# Patient Record
Sex: FEMALE | Race: WHITE | Employment: OTHER | URBAN - METROPOLITAN AREA
[De-identification: names, ages, dates, MRNs, and addresses within clinical notes are randomized per-mention and may not be internally consistent; named-entity substitution may affect disease eponyms.]

---

## 2017-01-10 ENCOUNTER — TELEPHONE (OUTPATIENT)
Dept: NEPHROLOGY | Facility: CLINIC | Age: 72
End: 2017-01-10

## 2017-01-10 RX ORDER — LOSARTAN POTASSIUM 25 MG/1
25 TABLET ORAL DAILY
Qty: 90 TABLET | Refills: 1 | Status: SHIPPED | OUTPATIENT
Start: 2017-01-10 | End: 2017-07-11

## 2017-01-10 NOTE — TELEPHONE ENCOUNTER
BP readings     1/4- 137/84,61    1/5- 148/81,65    1/6- 129/70,64    1/7- 826/85,75    1/8- 145/80,60    1/9- 123/73,71    1/10- 759/54,43

## 2017-01-10 NOTE — TELEPHONE ENCOUNTER
Patient returned call. Dr. Tia Rivera blood pressure advice relayed. Patient is aware to start Losartan 25mg daily as Dr. Sarah Vidal advised.  She requested prescription be sent to [facility] order pharmacy so she will start monitoring her blood pressure for 1

## 2017-01-26 ENCOUNTER — TELEPHONE (OUTPATIENT)
Dept: NEPHROLOGY | Facility: CLINIC | Age: 72
End: 2017-01-26

## 2017-01-26 RX ORDER — AZITHROMYCIN 250 MG/1
TABLET, FILM COATED ORAL
Qty: 1 PACKAGE | Refills: 0 | Status: SHIPPED | OUTPATIENT
Start: 2017-01-26 | End: 2017-05-18

## 2017-01-26 NOTE — TELEPHONE ENCOUNTER
Patient contacted. Informed that Dr. Alisha Avelar ordered a Z Pack and it was sent to [facility]. Advised to be seen if not better.

## 2017-01-26 NOTE — TELEPHONE ENCOUNTER
BP Readings - Left Arm    Date   BP  Pulse  1/18/2017  115/73  76  1/19/2017  122/77  69  1/20/2017  121/77  69  1/21/2017  135/87  69  1/22/2017  129/73  61  1/23/2017  128/72  68  1/24/2017  112/75  82  1/25/2017  116/73  69    For add'l questions pls

## 2017-01-26 NOTE — TELEPHONE ENCOUNTER
Patient contacted and Dr. Almonte Hence blood pressure advice relayed. TUAN attached a sick message below the blood pressure readings. Patient has severe nasal congestion since Monday. Pressure and pain around eyes, forehead and jaw.  She says it is a sinus infe

## 2017-01-26 NOTE — TELEPHONE ENCOUNTER
Pt states she has not been feeling well. Requesting rx for Azithromycin for sinus infection. Current Outpatient Prescriptions:  azithromycin 250 MG Oral Tab Take 2 tablets the first day and 1 tablet every day after until completed.  Disp: 1 Package

## 2017-03-27 RX ORDER — SIMVASTATIN 40 MG
TABLET ORAL
Qty: 90 TABLET | Refills: 0 | Status: SHIPPED | OUTPATIENT
Start: 2017-03-27 | End: 2019-09-06

## 2017-03-27 RX ORDER — FLUTICASONE PROPIONATE 50 MCG
SPRAY, SUSPENSION (ML) NASAL
Qty: 48 G | Refills: 0 | Status: SHIPPED | OUTPATIENT
Start: 2017-03-27 | End: 2018-01-17

## 2017-05-18 RX ORDER — PROMETHAZINE HYDROCHLORIDE AND CODEINE PHOSPHATE 6.25; 1 MG/5ML; MG/5ML
SYRUP ORAL
Qty: 180 ML | Refills: 0 | OUTPATIENT
Start: 2017-05-18 | End: 2018-07-02 | Stop reason: ALTCHOICE

## 2017-05-18 RX ORDER — AZITHROMYCIN 250 MG/1
TABLET, FILM COATED ORAL
Qty: 1 PACKAGE | Refills: 0 | Status: SHIPPED | OUTPATIENT
Start: 2017-05-18 | End: 2017-12-29 | Stop reason: ALTCHOICE

## 2017-05-18 NOTE — TELEPHONE ENCOUNTER
Rx for Zpak sent. I cannot sign off on the cough medicine since it is controlled. Patient notified. Let her know that we can call her with MKK signs the rx tomorrow. Rx pended.

## 2017-05-18 NOTE — TELEPHONE ENCOUNTER
Rx was authorized by [employer] so called it into [facility] and left it on the voicemail. Patient notified.

## 2017-05-18 NOTE — TELEPHONE ENCOUNTER
Received refill requests from the pharmacy for cough medicine and Zpak. Called pt to find out why she's requesting these. She states on Tuesday, 5/16, she stated having a cough, sinus pain, and headache. Hasn't been able to sleep.  Alternates from congested

## 2017-06-30 ENCOUNTER — OFFICE VISIT (OUTPATIENT)
Dept: NEPHROLOGY | Facility: CLINIC | Age: 72
End: 2017-06-30

## 2017-06-30 ENCOUNTER — LAB ENCOUNTER (OUTPATIENT)
Dept: LAB | Facility: HOSPITAL | Age: 72
End: 2017-06-30
Attending: INTERNAL MEDICINE
Payer: MEDICARE

## 2017-06-30 VITALS
BODY MASS INDEX: 28.52 KG/M2 | WEIGHT: 171.19 LBS | HEIGHT: 65 IN | HEART RATE: 72 BPM | SYSTOLIC BLOOD PRESSURE: 171 MMHG | DIASTOLIC BLOOD PRESSURE: 80 MMHG

## 2017-06-30 DIAGNOSIS — Z12.31 ENCOUNTER FOR SCREENING MAMMOGRAM FOR BREAST CANCER: ICD-10-CM

## 2017-06-30 DIAGNOSIS — I10 ESSENTIAL HYPERTENSION: Primary | ICD-10-CM

## 2017-06-30 DIAGNOSIS — E78.00 PURE HYPERCHOLESTEROLEMIA: ICD-10-CM

## 2017-06-30 DIAGNOSIS — I10 ESSENTIAL HYPERTENSION: ICD-10-CM

## 2017-06-30 DIAGNOSIS — G43.109 MIGRAINE WITH AURA AND WITHOUT STATUS MIGRAINOSUS, NOT INTRACTABLE: ICD-10-CM

## 2017-06-30 LAB
ALBUMIN SERPL BCP-MCNC: 4.4 G/DL (ref 3.5–4.8)
ALBUMIN/GLOB SERPL: 1.6 {RATIO} (ref 1–2)
ALP SERPL-CCNC: 92 U/L (ref 32–100)
ALT SERPL-CCNC: 19 U/L (ref 14–54)
ANION GAP SERPL CALC-SCNC: 7 MMOL/L (ref 0–18)
AST SERPL-CCNC: 19 U/L (ref 15–41)
BASOPHILS # BLD: 0 K/UL (ref 0–0.2)
BASOPHILS NFR BLD: 0 %
BILIRUB SERPL-MCNC: 0.9 MG/DL (ref 0.3–1.2)
BILIRUB UR QL: NEGATIVE
BUN SERPL-MCNC: 14 MG/DL (ref 8–20)
BUN/CREAT SERPL: 15.7 (ref 10–20)
CALCIUM SERPL-MCNC: 9.9 MG/DL (ref 8.5–10.5)
CHLORIDE SERPL-SCNC: 106 MMOL/L (ref 95–110)
CHOLEST SERPL-MCNC: 167 MG/DL (ref 110–200)
CLARITY UR: CLEAR
CO2 SERPL-SCNC: 26 MMOL/L (ref 22–32)
COLOR UR: YELLOW
CREAT SERPL-MCNC: 0.89 MG/DL (ref 0.5–1.5)
EOSINOPHIL # BLD: 0 K/UL (ref 0–0.7)
EOSINOPHIL NFR BLD: 0 %
ERYTHROCYTE [DISTWIDTH] IN BLOOD BY AUTOMATED COUNT: 12.6 % (ref 11–15)
GLOBULIN PLAS-MCNC: 2.8 G/DL (ref 2.5–3.7)
GLUCOSE SERPL-MCNC: 103 MG/DL (ref 70–99)
GLUCOSE UR-MCNC: NEGATIVE MG/DL
HCT VFR BLD AUTO: 43.4 % (ref 35–48)
HDLC SERPL-MCNC: 53 MG/DL
HGB BLD-MCNC: 14.9 G/DL (ref 12–16)
KETONES UR-MCNC: NEGATIVE MG/DL
LDLC SERPL CALC-MCNC: 74 MG/DL (ref 0–99)
LEUKOCYTE ESTERASE UR QL STRIP.AUTO: NEGATIVE
LYMPHOCYTES # BLD: 0.9 K/UL (ref 1–4)
LYMPHOCYTES NFR BLD: 20 %
MCH RBC QN AUTO: 31.7 PG (ref 27–32)
MCHC RBC AUTO-ENTMCNC: 34.2 G/DL (ref 32–37)
MCV RBC AUTO: 92.7 FL (ref 80–100)
MONOCYTES # BLD: 0.3 K/UL (ref 0–1)
MONOCYTES NFR BLD: 8 %
NEUTROPHILS # BLD AUTO: 3.2 K/UL (ref 1.8–7.7)
NEUTROPHILS NFR BLD: 72 %
NITRITE UR QL STRIP.AUTO: NEGATIVE
NONHDLC SERPL-MCNC: 114 MG/DL
OSMOLALITY UR CALC.SUM OF ELEC: 289 MOSM/KG (ref 275–295)
PH UR: 5 [PH] (ref 5–8)
PLATELET # BLD AUTO: 162 K/UL (ref 140–400)
PMV BLD AUTO: 8.3 FL (ref 7.4–10.3)
POTASSIUM SERPL-SCNC: 4.6 MMOL/L (ref 3.3–5.1)
PROT SERPL-MCNC: 7.2 G/DL (ref 5.9–8.4)
PROT UR-MCNC: NEGATIVE MG/DL
RBC # BLD AUTO: 4.69 M/UL (ref 3.7–5.4)
RBC #/AREA URNS AUTO: 1 /HPF
SODIUM SERPL-SCNC: 139 MMOL/L (ref 136–144)
SP GR UR STRIP: 1.02 (ref 1–1.03)
TRIGL SERPL-MCNC: 198 MG/DL (ref 1–149)
UROBILINOGEN UR STRIP-ACNC: <2
VIT C UR-MCNC: NEGATIVE MG/DL
WBC # BLD AUTO: 4.5 K/UL (ref 4–11)
WBC #/AREA URNS AUTO: 2 /HPF

## 2017-06-30 PROCEDURE — 85025 COMPLETE CBC W/AUTO DIFF WBC: CPT

## 2017-06-30 PROCEDURE — 36415 COLL VENOUS BLD VENIPUNCTURE: CPT

## 2017-06-30 PROCEDURE — 99214 OFFICE O/P EST MOD 30 MIN: CPT | Performed by: INTERNAL MEDICINE

## 2017-06-30 PROCEDURE — 81001 URINALYSIS AUTO W/SCOPE: CPT

## 2017-06-30 PROCEDURE — 80061 LIPID PANEL: CPT

## 2017-06-30 PROCEDURE — G0463 HOSPITAL OUTPT CLINIC VISIT: HCPCS | Performed by: INTERNAL MEDICINE

## 2017-06-30 PROCEDURE — 80053 COMPREHEN METABOLIC PANEL: CPT

## 2017-07-01 NOTE — PROGRESS NOTES
[facility]  Nephrology Daily Progress Note    Justine Givens  [id number]  66year old      HPI:   Justine Givens is a 66year old female.   41-year-old female with a history of hypertension, hypercholesterolemia, allergic rhinitis, GERD, chr rhythm no murmurs, gallups, or rubs  Abdomen: soft non-tender non-distended no organomegaly noted no masses  Musculoskeletal: full ROM all extremities good strength  no deformities  Extremities: no edema, cyanosis, or clubbing  Neurological: exam appropria

## 2017-07-11 RX ORDER — LOSARTAN POTASSIUM 25 MG/1
25 TABLET ORAL DAILY
Qty: 90 TABLET | Refills: 1 | Status: SHIPPED | OUTPATIENT
Start: 2017-07-11 | End: 2018-01-17

## 2017-07-11 RX ORDER — MONTELUKAST SODIUM 10 MG/1
TABLET ORAL
Qty: 90 TABLET | Refills: 1 | Status: SHIPPED | OUTPATIENT
Start: 2017-07-11 | End: 2018-09-11

## 2017-07-11 NOTE — TELEPHONE ENCOUNTER
BP Readings - Left arm    Date  BP  Pulse    7/5/2017 134/73  64  7/6/2017 123/70  71  7/7/2017 126/70  62  7/8/2017 135/75  64  7/9/2017 124/65  73  7/10/2017 138/80  62  7/11/2017 135/75  64    Pt also requesting refills Lorsartan.     Current Outpatient

## 2017-08-22 ENCOUNTER — HOSPITAL ENCOUNTER (OUTPATIENT)
Dept: MAMMOGRAPHY | Facility: HOSPITAL | Age: 72
Discharge: HOME | End: 2017-08-22
Attending: INTERNAL MEDICINE
Payer: MEDICARE

## 2017-08-22 DIAGNOSIS — Z12.31 ENCOUNTER FOR SCREENING MAMMOGRAM FOR BREAST CANCER: ICD-10-CM

## 2017-08-22 PROCEDURE — 77067 SCR MAMMO BI INCL CAD: CPT | Performed by: INTERNAL MEDICINE

## 2017-12-29 ENCOUNTER — APPOINTMENT (OUTPATIENT)
Dept: LAB | Facility: HOSPITAL | Age: 72
End: 2017-12-29
Attending: INTERNAL MEDICINE
Payer: MEDICARE

## 2017-12-29 ENCOUNTER — OFFICE VISIT (OUTPATIENT)
Dept: NEPHROLOGY | Facility: CLINIC | Age: 72
End: 2017-12-29

## 2017-12-29 VITALS
BODY MASS INDEX: 29.59 KG/M2 | DIASTOLIC BLOOD PRESSURE: 69 MMHG | WEIGHT: 177.63 LBS | HEIGHT: 65 IN | SYSTOLIC BLOOD PRESSURE: 107 MMHG | HEART RATE: 71 BPM

## 2017-12-29 DIAGNOSIS — I10 ESSENTIAL HYPERTENSION: ICD-10-CM

## 2017-12-29 DIAGNOSIS — E78.00 PURE HYPERCHOLESTEROLEMIA: ICD-10-CM

## 2017-12-29 DIAGNOSIS — E78.00 PURE HYPERCHOLESTEROLEMIA: Primary | ICD-10-CM

## 2017-12-29 LAB
ALBUMIN SERPL BCP-MCNC: 4.4 G/DL (ref 3.5–4.8)
ALP SERPL-CCNC: 84 U/L (ref 32–100)
ALT SERPL-CCNC: 18 U/L (ref 14–54)
AST SERPL-CCNC: 22 U/L (ref 15–41)
BILIRUB DIRECT SERPL-MCNC: 0.1 MG/DL (ref 0–0.2)
BILIRUB SERPL-MCNC: 0.8 MG/DL (ref 0.3–1.2)
CHOLEST SERPL-MCNC: 168 MG/DL (ref 110–200)
HDLC SERPL-MCNC: 65 MG/DL
LDLC SERPL CALC-MCNC: 79 MG/DL (ref 0–99)
NONHDLC SERPL-MCNC: 103 MG/DL
PROT SERPL-MCNC: 7.1 G/DL (ref 5.9–8.4)
TRIGL SERPL-MCNC: 122 MG/DL (ref 1–149)

## 2017-12-29 PROCEDURE — G0463 HOSPITAL OUTPT CLINIC VISIT: HCPCS | Performed by: INTERNAL MEDICINE

## 2017-12-29 PROCEDURE — 99214 OFFICE O/P EST MOD 30 MIN: CPT | Performed by: INTERNAL MEDICINE

## 2017-12-29 PROCEDURE — 36415 COLL VENOUS BLD VENIPUNCTURE: CPT

## 2017-12-29 PROCEDURE — 80061 LIPID PANEL: CPT

## 2017-12-29 PROCEDURE — 80076 HEPATIC FUNCTION PANEL: CPT

## 2017-12-30 NOTE — PROGRESS NOTES
[facility], [facility]  Nephrology Daily Progress Note    Belén Mayo  [id number]  67year old  Patient presents with:  Hypercholesterolemia      HPI:   Belén Mayo is a 67year old female.     79-year-old female with a history of hypertension, hypercholeste axillary adenopathy is noted  Respiratory: normal to inspection lungs are clear to auscultation bilaterally normal respiratory effort  Cardiovascular: regular rate and rhythm no murmurs, gallups, or rubs  Abdomen: soft non-tender non-distended no organomeg 6.25-10 MG/5ML Oral Syrup, TAKE 2.5 ML BY MOUTH EVERY 4 HOURS AS NEEDED, Disp: 180 mL, Rfl: 0  •  FLUZONE HIGH-DOSE 0.5 ML Intramuscular Suspension Prefilled Syringe, ADM 0.5ML IM UTD, Disp: , Rfl: 0    Allergies:    Cortisone                   Comment:Oth

## 2018-01-17 RX ORDER — LOSARTAN POTASSIUM 25 MG/1
TABLET ORAL
Qty: 90 TABLET | Refills: 1 | Status: SHIPPED | OUTPATIENT
Start: 2018-01-17 | End: 2018-07-23 | Stop reason: DRUGHIGH

## 2018-01-17 RX ORDER — LISINOPRIL AND HYDROCHLOROTHIAZIDE 20; 12.5 MG/1; MG/1
1 TABLET ORAL DAILY
Qty: 90 TABLET | Refills: 1 | Status: SHIPPED | OUTPATIENT
Start: 2018-01-17 | End: 2018-07-02 | Stop reason: ALTCHOICE

## 2018-01-17 RX ORDER — FLUTICASONE PROPIONATE 50 MCG
SPRAY, SUSPENSION (ML) NASAL
Qty: 3 BOTTLE | Refills: 1 | Status: SHIPPED | OUTPATIENT
Start: 2018-01-17 | End: 2019-12-18

## 2018-03-20 ENCOUNTER — OFFICE VISIT (OUTPATIENT)
Dept: GASTROENTEROLOGY | Facility: CLINIC | Age: 73
End: 2018-03-20

## 2018-03-20 VITALS
SYSTOLIC BLOOD PRESSURE: 179 MMHG | DIASTOLIC BLOOD PRESSURE: 80 MMHG | HEIGHT: 65 IN | WEIGHT: 170.81 LBS | BODY MASS INDEX: 28.46 KG/M2 | HEART RATE: 68 BPM

## 2018-03-20 DIAGNOSIS — Z87.19 HX OF GASTROESOPHAGEAL REFLUX (GERD): ICD-10-CM

## 2018-03-20 DIAGNOSIS — Z12.11 ENCOUNTER FOR COLORECTAL CANCER SCREENING: Primary | ICD-10-CM

## 2018-03-20 DIAGNOSIS — Z12.12 ENCOUNTER FOR COLORECTAL CANCER SCREENING: Primary | ICD-10-CM

## 2018-03-20 PROCEDURE — 99213 OFFICE O/P EST LOW 20 MIN: CPT | Performed by: INTERNAL MEDICINE

## 2018-03-20 NOTE — PATIENT INSTRUCTIONS
1.  Continue Lansoprazole daily before breakfast    2.   Colonoscopy due in 2023, unless other signs or symptoms develop in the interim

## 2018-03-20 NOTE — PROGRESS NOTES
HPI:    Patient ID: María Rivera is a 67year old female. HPI    Review of Systems   Constitutional: Negative for activity change, appetite change, chills, diaphoresis, fatigue, fever and unexpected weight change.    HENT: Negative for congestion, ear Syrup TAKE 2.5 ML BY MOUTH EVERY 4 HOURS AS NEEDED Disp: 180 mL Rfl: 0   SIMVASTATIN 40 MG Oral Tab TAKE 1 TABLET BY MOUTH IN THE EVENING Disp: 90 tablet Rfl: 0   FLUZONE HIGH-DOSE 0.5 ML Intramuscular Suspension Prefilled Syringe ADM 0.5ML IM UTD Disp:  R Psychiatric: She has a normal mood and affect. Her behavior is normal. Judgment and thought content normal.   Nursing note and vitals reviewed.              ASSESSMENT/PLAN:   Encounter for colorectal cancer screening  (primary encounter diagnosis)    No

## 2018-03-20 NOTE — H&P
History of present illness: This is a 79-year-old female who is known to me from prior evaluation. The patient underwent colonoscopy in 2013 which showed:    \" IMPRESSION:  1.  Status post polypectomy x2, rule out hyperplastic versus adenomatous  polyp

## 2018-05-15 ENCOUNTER — TELEPHONE (OUTPATIENT)
Dept: NEPHROLOGY | Facility: CLINIC | Age: 73
End: 2018-05-15

## 2018-05-15 RX ORDER — CIPROFLOXACIN 250 MG/1
250 TABLET, FILM COATED ORAL 2 TIMES DAILY
Qty: 6 TABLET | Refills: 0 | Status: SHIPPED | OUTPATIENT
Start: 2018-05-15 | End: 2018-07-02 | Stop reason: ALTCHOICE

## 2018-05-15 NOTE — TELEPHONE ENCOUNTER
Patient contacted. She has a UTI that started several days ago. Symptoms: burning with urination, urinary frequency. Denies any fever, no chills, no abd pain, no visible blood seen in urine. Advise.

## 2018-07-02 ENCOUNTER — TELEPHONE (OUTPATIENT)
Dept: NEPHROLOGY | Facility: CLINIC | Age: 73
End: 2018-07-02

## 2018-07-02 ENCOUNTER — OFFICE VISIT (OUTPATIENT)
Dept: NEPHROLOGY | Facility: CLINIC | Age: 73
End: 2018-07-02

## 2018-07-02 ENCOUNTER — LAB ENCOUNTER (OUTPATIENT)
Dept: LAB | Facility: HOSPITAL | Age: 73
End: 2018-07-02
Attending: INTERNAL MEDICINE
Payer: MEDICARE

## 2018-07-02 VITALS
DIASTOLIC BLOOD PRESSURE: 81 MMHG | WEIGHT: 174.63 LBS | HEIGHT: 65 IN | BODY MASS INDEX: 29.09 KG/M2 | HEART RATE: 75 BPM | SYSTOLIC BLOOD PRESSURE: 159 MMHG

## 2018-07-02 DIAGNOSIS — I10 ESSENTIAL HYPERTENSION: ICD-10-CM

## 2018-07-02 DIAGNOSIS — E78.00 PURE HYPERCHOLESTEROLEMIA: ICD-10-CM

## 2018-07-02 DIAGNOSIS — I10 ESSENTIAL HYPERTENSION: Primary | ICD-10-CM

## 2018-07-02 LAB
ALBUMIN SERPL BCP-MCNC: 4.3 G/DL (ref 3.5–4.8)
ALBUMIN/GLOB SERPL: 1.5 {RATIO} (ref 1–2)
ALP SERPL-CCNC: 79 U/L (ref 32–100)
ALT SERPL-CCNC: 18 U/L (ref 14–54)
ANION GAP SERPL CALC-SCNC: 7 MMOL/L (ref 0–18)
AST SERPL-CCNC: 24 U/L (ref 15–41)
BASOPHILS # BLD: 0 K/UL (ref 0–0.2)
BASOPHILS NFR BLD: 0 %
BILIRUB SERPL-MCNC: 0.8 MG/DL (ref 0.3–1.2)
BILIRUB UR QL: NEGATIVE
BUN SERPL-MCNC: 17 MG/DL (ref 8–20)
BUN/CREAT SERPL: 17 (ref 10–20)
CALCIUM SERPL-MCNC: 9.7 MG/DL (ref 8.5–10.5)
CHLORIDE SERPL-SCNC: 107 MMOL/L (ref 95–110)
CHOLEST SERPL-MCNC: 167 MG/DL (ref 110–200)
CLARITY UR: CLEAR
CO2 SERPL-SCNC: 26 MMOL/L (ref 22–32)
COLOR UR: YELLOW
CREAT SERPL-MCNC: 1 MG/DL (ref 0.5–1.5)
EOSINOPHIL # BLD: 0 K/UL (ref 0–0.7)
EOSINOPHIL NFR BLD: 1 %
ERYTHROCYTE [DISTWIDTH] IN BLOOD BY AUTOMATED COUNT: 13.2 % (ref 11–15)
GLOBULIN PLAS-MCNC: 2.8 G/DL (ref 2.5–3.7)
GLUCOSE SERPL-MCNC: 99 MG/DL (ref 70–99)
GLUCOSE UR-MCNC: NEGATIVE MG/DL
HCT VFR BLD AUTO: 40.9 % (ref 35–48)
HDLC SERPL-MCNC: 69 MG/DL
HGB BLD-MCNC: 14 G/DL (ref 12–16)
KETONES UR-MCNC: NEGATIVE MG/DL
LDLC SERPL CALC-MCNC: 76 MG/DL (ref 0–99)
LYMPHOCYTES # BLD: 0.9 K/UL (ref 1–4)
LYMPHOCYTES NFR BLD: 20 %
MCH RBC QN AUTO: 31.3 PG (ref 27–32)
MCHC RBC AUTO-ENTMCNC: 34.3 G/DL (ref 32–37)
MCV RBC AUTO: 91.1 FL (ref 80–100)
MONOCYTES # BLD: 0.3 K/UL (ref 0–1)
MONOCYTES NFR BLD: 7 %
NEUTROPHILS # BLD AUTO: 3.4 K/UL (ref 1.8–7.7)
NEUTROPHILS NFR BLD: 72 %
NITRITE UR QL STRIP.AUTO: NEGATIVE
NONHDLC SERPL-MCNC: 98 MG/DL
OSMOLALITY UR CALC.SUM OF ELEC: 292 MOSM/KG (ref 275–295)
PATIENT FASTING: YES
PH UR: 5 [PH] (ref 5–8)
PLATELET # BLD AUTO: 142 K/UL (ref 140–400)
PMV BLD AUTO: 8.1 FL (ref 7.4–10.3)
POTASSIUM SERPL-SCNC: 4.5 MMOL/L (ref 3.3–5.1)
PROT SERPL-MCNC: 7.1 G/DL (ref 5.9–8.4)
PROT UR-MCNC: NEGATIVE MG/DL
RBC # BLD AUTO: 4.49 M/UL (ref 3.7–5.4)
RBC #/AREA URNS AUTO: 3 /HPF
SODIUM SERPL-SCNC: 140 MMOL/L (ref 136–144)
SP GR UR STRIP: 1.02 (ref 1–1.03)
TRIGL SERPL-MCNC: 108 MG/DL (ref 1–149)
UROBILINOGEN UR STRIP-ACNC: <2
VIT C UR-MCNC: NEGATIVE MG/DL
WBC # BLD AUTO: 4.7 K/UL (ref 4–11)
WBC #/AREA URNS AUTO: 22 /HPF

## 2018-07-02 PROCEDURE — 81001 URINALYSIS AUTO W/SCOPE: CPT

## 2018-07-02 PROCEDURE — 36415 COLL VENOUS BLD VENIPUNCTURE: CPT

## 2018-07-02 PROCEDURE — 99214 OFFICE O/P EST MOD 30 MIN: CPT | Performed by: INTERNAL MEDICINE

## 2018-07-02 PROCEDURE — 80061 LIPID PANEL: CPT

## 2018-07-02 PROCEDURE — 85025 COMPLETE CBC W/AUTO DIFF WBC: CPT

## 2018-07-02 PROCEDURE — G0463 HOSPITAL OUTPT CLINIC VISIT: HCPCS | Performed by: INTERNAL MEDICINE

## 2018-07-02 PROCEDURE — 80053 COMPREHEN METABOLIC PANEL: CPT

## 2018-07-02 RX ORDER — LANSOPRAZOLE 30 MG/1
30 CAPSULE, DELAYED RELEASE ORAL
Qty: 90 CAPSULE | Refills: 1 | Status: SHIPPED | OUTPATIENT
Start: 2018-07-02 | End: 2019-02-08

## 2018-07-02 NOTE — TELEPHONE ENCOUNTER
[facility] states pt is at lab right now and there are no orders in system. Please call Rob Smith once labs are entered at ext [phone]. Pt aware she might have to wait/MKK in with pt per RN.

## 2018-07-03 NOTE — PROGRESS NOTES
[facility], [facility]  Nephrology Daily Progress Note    Tommy Magallon  [id number]  67year old  Patient presents with: Annual      HPI:   Tommy Magallon is a 67year old female.   39-year-old female with a history of hypertension, hypercholesterolemia, allergi abnormal cervical, supraclavicular or axillary adenopathy is noted  Respiratory: normal to inspection lungs are clear to auscultation bilaterally normal respiratory effort  Cardiovascular: regular rate and rhythm no murmurs, gallups, or rubs  Abdomen: soft ASSESSMENT/PLAN:   Assessment   Essential hypertension  (primary encounter diagnosis)  Pure hypercholesterolemia    Patient Active Problem List:     Pure hypercholesterolemia     Migraine     HTN (hypertension)     Hx of gastroesophageal reflux (GERD)

## 2018-07-03 NOTE — PATIENT INSTRUCTIONS
Try and check your blood pressures at least 4 5 times and call me with the results. Do labs as ordered.

## 2018-07-23 ENCOUNTER — TELEPHONE (OUTPATIENT)
Dept: NEPHROLOGY | Facility: CLINIC | Age: 73
End: 2018-07-23

## 2018-07-23 DIAGNOSIS — R35.0 URINARY FREQUENCY: Primary | ICD-10-CM

## 2018-07-23 RX ORDER — LOSARTAN POTASSIUM 50 MG/1
50 TABLET ORAL DAILY
Qty: 90 TABLET | Refills: 0 | Status: SHIPPED | OUTPATIENT
Start: 2018-07-23 | End: 2018-10-27

## 2018-07-23 NOTE — TELEPHONE ENCOUNTER
Patient contacted. She was taking Cipro for a previous UTI and it gave her a headache and made her feel very jittery. She doesn't want to take this again if lab tests confirm another UTI.  Should this be added to allergy list. She is also aware of increase

## 2018-07-23 NOTE — TELEPHONE ENCOUNTER
What side effects and what medicine does she thinks she is having side effects from. Can repeat a urinalysis and urine culture if she is worried about a UTI. Increase losartan to 50 mg daily. Call in 1-2 weeks with blood pressure readings.

## 2018-07-23 NOTE — TELEPHONE ENCOUNTER
Pt calling with BP readings. Pt states she has been urinating more frequently. Pt states she was given ciprofloxacin 250MG before when experiencing symptoms notated. Pt also states she experienced side effects from medication.  Please call thank you [phone]

## 2018-07-24 ENCOUNTER — APPOINTMENT (OUTPATIENT)
Dept: LAB | Facility: HOSPITAL | Age: 73
End: 2018-07-24
Attending: INTERNAL MEDICINE
Payer: MEDICARE

## 2018-07-24 DIAGNOSIS — R35.0 URINARY FREQUENCY: ICD-10-CM

## 2018-07-24 LAB
BILIRUB UR QL: NEGATIVE
CLARITY UR: CLEAR
COLOR UR: YELLOW
GLUCOSE UR-MCNC: NEGATIVE MG/DL
KETONES UR-MCNC: NEGATIVE MG/DL
NITRITE UR QL STRIP.AUTO: NEGATIVE
PH UR: 6 [PH] (ref 5–8)
PROT UR-MCNC: NEGATIVE MG/DL
RBC #/AREA URNS AUTO: 1 /HPF
SP GR UR STRIP: 1.01 (ref 1–1.03)
UROBILINOGEN UR STRIP-ACNC: <2
VIT C UR-MCNC: NEGATIVE MG/DL
WBC #/AREA URNS AUTO: 9 /HPF

## 2018-07-24 PROCEDURE — 81001 URINALYSIS AUTO W/SCOPE: CPT

## 2018-07-24 PROCEDURE — 87086 URINE CULTURE/COLONY COUNT: CPT

## 2018-07-24 PROCEDURE — 87186 SC STD MICRODIL/AGAR DIL: CPT

## 2018-07-24 PROCEDURE — 87077 CULTURE AEROBIC IDENTIFY: CPT

## 2018-07-26 ENCOUNTER — TELEPHONE (OUTPATIENT)
Dept: NEPHROLOGY | Facility: CLINIC | Age: 73
End: 2018-07-26

## 2018-07-26 RX ORDER — NITROFURANTOIN 25; 75 MG/1; MG/1
100 CAPSULE ORAL 2 TIMES DAILY
Qty: 14 CAPSULE | Refills: 0 | Status: SHIPPED | OUTPATIENT
Start: 2018-07-26 | End: 2019-03-25 | Stop reason: ALTCHOICE

## 2018-07-26 NOTE — TELEPHONE ENCOUNTER
Urine Culture was positive. Treat with Macrobid 100 mg twice daily ×7 days.   Call if symptoms do not improve

## 2018-07-26 NOTE — TELEPHONE ENCOUNTER
LMTCB to confirm that patient got this message or if she has any questions or concerns. Antibiotic prescription faxed to [facility] listed in chart.

## 2018-07-26 NOTE — TELEPHONE ENCOUNTER
Patient contacted and relayed Dr. Gideon Melendez message that patient has a UTI and antibiotic was faxed to [place]. Patient is aware to let Dr. Davy Cates know if symptoms do not improve.

## 2018-08-03 ENCOUNTER — TELEPHONE (OUTPATIENT)
Dept: NEPHROLOGY | Facility: CLINIC | Age: 73
End: 2018-08-03

## 2018-08-03 DIAGNOSIS — Z12.31 VISIT FOR SCREENING MAMMOGRAM: Primary | ICD-10-CM

## 2018-08-06 ENCOUNTER — TELEPHONE (OUTPATIENT)
Dept: NEPHROLOGY | Facility: CLINIC | Age: 73
End: 2018-08-06

## 2018-08-06 NOTE — TELEPHONE ENCOUNTER
Patient contacted. Dr. Massimo Lew blood pressure advice read.  She is aware to continue present management (no aquino in medications or treatment plan.)

## 2018-08-06 NOTE — TELEPHONE ENCOUNTER
Blood pressure readings     Taken around noon time.     07/30/18  132  78  69     07/31/18  134  75  65      08/1/18  118  61  78    08/2/18  124  72  78     08/3/18  128  79  67

## 2018-08-06 NOTE — TELEPHONE ENCOUNTER
Contacted pt and notified her that [employer] [employer] completed handicap placard form and entered order for mammogram. She requested that I send her the handicap placard form.  Also notified her that her last mammo was 8/22/17 so she should wait until 8/22/18 to do this y

## 2018-09-04 ENCOUNTER — TELEPHONE (OUTPATIENT)
Dept: NEPHROLOGY | Facility: CLINIC | Age: 73
End: 2018-09-04

## 2018-09-11 RX ORDER — MONTELUKAST SODIUM 10 MG/1
TABLET ORAL
Qty: 90 TABLET | Refills: 1 | Status: SHIPPED | OUTPATIENT
Start: 2018-09-11 | End: 2019-05-30

## 2018-09-15 ENCOUNTER — HOSPITAL ENCOUNTER (OUTPATIENT)
Dept: MAMMOGRAPHY | Age: 73
Discharge: HOME | End: 2018-09-15
Attending: INTERNAL MEDICINE
Payer: MEDICARE

## 2018-09-15 DIAGNOSIS — Z12.31 VISIT FOR SCREENING MAMMOGRAM: ICD-10-CM

## 2018-09-15 PROCEDURE — 77067 SCR MAMMO BI INCL CAD: CPT | Performed by: INTERNAL MEDICINE

## 2018-10-29 RX ORDER — LOSARTAN POTASSIUM 50 MG/1
TABLET ORAL
Qty: 90 TABLET | Refills: 0 | Status: SHIPPED | OUTPATIENT
Start: 2018-10-29 | End: 2019-02-08

## 2019-02-08 DIAGNOSIS — E78.00 PURE HYPERCHOLESTEROLEMIA: Primary | ICD-10-CM

## 2019-02-08 RX ORDER — LANSOPRAZOLE 30 MG/1
CAPSULE, DELAYED RELEASE ORAL
Qty: 90 CAPSULE | Refills: 0 | Status: SHIPPED | OUTPATIENT
Start: 2019-02-08 | End: 2019-05-10

## 2019-02-08 RX ORDER — LOSARTAN POTASSIUM 50 MG/1
50 TABLET ORAL DAILY
Qty: 90 TABLET | Refills: 0 | Status: SHIPPED | OUTPATIENT
Start: 2019-02-08 | End: 2019-05-10

## 2019-02-08 NOTE — TELEPHONE ENCOUNTER
Patient contacted. Advised of lab orders entered in [address] [place] [address] Rd. Instructed to fast 12 hours for lab work. Patient informed to schedule a follow up visit with Dr. Alisha Avelar but she has to find a ride and will call back after that to make an appointment.

## 2019-02-28 ENCOUNTER — APPOINTMENT (OUTPATIENT)
Dept: LAB | Age: 74
End: 2019-02-28
Attending: INTERNAL MEDICINE
Payer: MEDICARE

## 2019-02-28 DIAGNOSIS — E78.00 PURE HYPERCHOLESTEROLEMIA: ICD-10-CM

## 2019-02-28 LAB
ALBUMIN SERPL-MCNC: 4.1 G/DL (ref 3.4–5)
ALBUMIN/GLOB SERPL: 1.2 {RATIO} (ref 1–2)
ALP LIVER SERPL-CCNC: 96 U/L (ref 55–142)
ALT SERPL-CCNC: 20 U/L (ref 13–56)
ANION GAP SERPL CALC-SCNC: 5 MMOL/L (ref 0–18)
AST SERPL-CCNC: 14 U/L (ref 15–37)
BILIRUB SERPL-MCNC: 0.9 MG/DL (ref 0.1–2)
BUN BLD-MCNC: 24 MG/DL (ref 7–18)
BUN/CREAT SERPL: 23.1 (ref 10–20)
CALCIUM BLD-MCNC: 9.6 MG/DL (ref 8.5–10.1)
CHLORIDE SERPL-SCNC: 109 MMOL/L (ref 98–107)
CHOLEST SMN-MCNC: 146 MG/DL (ref ?–200)
CO2 SERPL-SCNC: 27 MMOL/L (ref 21–32)
CREAT BLD-MCNC: 1.04 MG/DL (ref 0.55–1.02)
GLOBULIN PLAS-MCNC: 3.3 G/DL (ref 2.8–4.4)
GLUCOSE BLD-MCNC: 84 MG/DL (ref 70–99)
HDLC SERPL-MCNC: 65 MG/DL (ref 40–59)
LDLC SERPL CALC-MCNC: 63 MG/DL (ref ?–100)
M PROTEIN MFR SERPL ELPH: 7.4 G/DL (ref 6.4–8.2)
NONHDLC SERPL-MCNC: 81 MG/DL (ref ?–130)
OSMOLALITY SERPL CALC.SUM OF ELEC: 295 MOSM/KG (ref 275–295)
POTASSIUM SERPL-SCNC: 3.9 MMOL/L (ref 3.5–5.1)
SODIUM SERPL-SCNC: 141 MMOL/L (ref 136–145)
TRIGL SERPL-MCNC: 91 MG/DL (ref 30–149)
VLDLC SERPL CALC-MCNC: 18 MG/DL (ref 0–30)

## 2019-02-28 PROCEDURE — 80053 COMPREHEN METABOLIC PANEL: CPT

## 2019-02-28 PROCEDURE — 36415 COLL VENOUS BLD VENIPUNCTURE: CPT

## 2019-02-28 PROCEDURE — 80061 LIPID PANEL: CPT

## 2019-03-25 ENCOUNTER — OFFICE VISIT (OUTPATIENT)
Dept: NEPHROLOGY | Facility: CLINIC | Age: 74
End: 2019-03-25
Payer: MEDICARE

## 2019-03-25 VITALS
DIASTOLIC BLOOD PRESSURE: 92 MMHG | WEIGHT: 176.19 LBS | BODY MASS INDEX: 29.36 KG/M2 | HEIGHT: 65 IN | HEART RATE: 72 BPM | SYSTOLIC BLOOD PRESSURE: 120 MMHG

## 2019-03-25 DIAGNOSIS — M54.50 ACUTE LEFT-SIDED LOW BACK PAIN WITHOUT SCIATICA: ICD-10-CM

## 2019-03-25 DIAGNOSIS — E78.00 PURE HYPERCHOLESTEROLEMIA: ICD-10-CM

## 2019-03-25 DIAGNOSIS — I10 ESSENTIAL HYPERTENSION: Primary | ICD-10-CM

## 2019-03-25 PROCEDURE — 99214 OFFICE O/P EST MOD 30 MIN: CPT | Performed by: INTERNAL MEDICINE

## 2019-03-25 PROCEDURE — G0463 HOSPITAL OUTPT CLINIC VISIT: HCPCS | Performed by: INTERNAL MEDICINE

## 2019-03-25 RX ORDER — CYCLOBENZAPRINE HCL 5 MG
TABLET ORAL 3 TIMES DAILY PRN
Qty: 30 TABLET | Refills: 0 | Status: SHIPPED | OUTPATIENT
Start: 2019-03-25 | End: 2021-01-28

## 2019-03-26 NOTE — PROGRESS NOTES
[facility], [facility]  Nephrology Daily Progress Note    Aletha Lopez  [id number]  75year old  Patient presents with:  Test Results      HPI:   Aletha Lopez is a 75year old female.   27-year-old female with a history of hypertension, hypercholesterolemia, a appears well hydrated alert and responsive no acute distress noted  Neck/Thyroid: neck is supple without adenopathy  Lymphatic: no abnormal cervical, supraclavicular or axillary adenopathy is noted  Respiratory: normal to inspection lungs are clear to ausc WHEEZING AS DIRECTED BY PHYSICIAN, Disp: 24 g, Rfl: 0  •  SIMVASTATIN 40 MG Oral Tab, TAKE 1 TABLET BY MOUTH IN THE EVENING, Disp: 90 tablet, Rfl: 0    Allergies:    Ciprofloxacin           JITTERY    Comment:Also got a headache  Cortisone

## 2019-05-10 RX ORDER — LOSARTAN POTASSIUM 50 MG/1
TABLET ORAL
Qty: 90 TABLET | Refills: 1 | Status: SHIPPED | OUTPATIENT
Start: 2019-05-10 | End: 2019-11-14

## 2019-05-10 RX ORDER — LANSOPRAZOLE 30 MG/1
CAPSULE, DELAYED RELEASE ORAL
Qty: 90 CAPSULE | Refills: 1 | Status: SHIPPED | OUTPATIENT
Start: 2019-05-10 | End: 2019-11-14

## 2019-05-30 RX ORDER — MONTELUKAST SODIUM 10 MG/1
TABLET ORAL
Qty: 90 TABLET | Refills: 1 | Status: SHIPPED | OUTPATIENT
Start: 2019-05-30 | End: 2019-12-06

## 2019-08-01 ENCOUNTER — TELEPHONE (OUTPATIENT)
Dept: NEPHROLOGY | Facility: CLINIC | Age: 74
End: 2019-08-01

## 2019-08-01 DIAGNOSIS — Z12.31 VISIT FOR SCREENING MAMMOGRAM: Primary | ICD-10-CM

## 2019-09-06 DIAGNOSIS — E78.00 PURE HYPERCHOLESTEROLEMIA: ICD-10-CM

## 2019-09-06 DIAGNOSIS — I10 ESSENTIAL HYPERTENSION: Primary | ICD-10-CM

## 2019-09-06 RX ORDER — SIMVASTATIN 40 MG
TABLET ORAL
Qty: 90 TABLET | Refills: 0 | Status: SHIPPED | OUTPATIENT
Start: 2019-09-06 | End: 2019-12-06

## 2019-09-06 NOTE — TELEPHONE ENCOUNTER
LOV 3/25/19  LR 3/27/17  Labs done 2/28/19  Rx pended for approval    Future Appointments   Date Time Provider Tony Marina   9/25/2019  1:00 PM Alice Brown MD [facility]   9/29/2019  7:40 AM [facility]

## 2019-09-20 ENCOUNTER — LAB ENCOUNTER (OUTPATIENT)
Dept: LAB | Age: 74
End: 2019-09-20
Attending: INTERNAL MEDICINE
Payer: MEDICARE

## 2019-09-20 DIAGNOSIS — E78.00 PURE HYPERCHOLESTEROLEMIA: ICD-10-CM

## 2019-09-20 DIAGNOSIS — I10 ESSENTIAL HYPERTENSION: ICD-10-CM

## 2019-09-20 LAB
ALBUMIN SERPL-MCNC: 4 G/DL (ref 3.4–5)
ALBUMIN/GLOB SERPL: 1.3 {RATIO} (ref 1–2)
ALP LIVER SERPL-CCNC: 96 U/L (ref 55–142)
ALT SERPL-CCNC: 19 U/L (ref 13–56)
ANION GAP SERPL CALC-SCNC: 4 MMOL/L (ref 0–18)
AST SERPL-CCNC: 18 U/L (ref 15–37)
BASOPHILS # BLD AUTO: 0.01 X10(3) UL (ref 0–0.2)
BASOPHILS NFR BLD AUTO: 0.3 %
BILIRUB SERPL-MCNC: 0.8 MG/DL (ref 0.1–2)
BILIRUB UR QL: NEGATIVE
BUN BLD-MCNC: 17 MG/DL (ref 7–18)
BUN/CREAT SERPL: 17.2 (ref 10–20)
CALCIUM BLD-MCNC: 9.5 MG/DL (ref 8.5–10.1)
CHLORIDE SERPL-SCNC: 109 MMOL/L (ref 98–112)
CHOLEST SMN-MCNC: 152 MG/DL (ref ?–200)
CO2 SERPL-SCNC: 28 MMOL/L (ref 21–32)
COLOR UR: YELLOW
CREAT BLD-MCNC: 0.99 MG/DL (ref 0.55–1.02)
DEPRECATED RDW RBC AUTO: 40.7 FL (ref 35.1–46.3)
EOSINOPHIL # BLD AUTO: 0.02 X10(3) UL (ref 0–0.7)
EOSINOPHIL NFR BLD AUTO: 0.5 %
ERYTHROCYTE [DISTWIDTH] IN BLOOD BY AUTOMATED COUNT: 12.5 % (ref 11–15)
GLOBULIN PLAS-MCNC: 3.2 G/DL (ref 2.8–4.4)
GLUCOSE BLD-MCNC: 91 MG/DL (ref 70–99)
GLUCOSE UR-MCNC: NEGATIVE MG/DL
HCT VFR BLD AUTO: 41.4 % (ref 35–48)
HDLC SERPL-MCNC: 58 MG/DL (ref 40–59)
HGB BLD-MCNC: 14 G/DL (ref 12–16)
HYALINE CASTS #/AREA URNS AUTO: 2 /LPF
IMM GRANULOCYTES # BLD AUTO: 0.01 X10(3) UL (ref 0–1)
IMM GRANULOCYTES NFR BLD: 0.3 %
KETONES UR-MCNC: NEGATIVE MG/DL
LDLC SERPL CALC-MCNC: 69 MG/DL (ref ?–100)
LYMPHOCYTES # BLD AUTO: 0.93 X10(3) UL (ref 1–4)
LYMPHOCYTES NFR BLD AUTO: 23.8 %
M PROTEIN MFR SERPL ELPH: 7.2 G/DL (ref 6.4–8.2)
MCH RBC QN AUTO: 30.7 PG (ref 26–34)
MCHC RBC AUTO-ENTMCNC: 33.8 G/DL (ref 31–37)
MCV RBC AUTO: 90.8 FL (ref 80–100)
MONOCYTES # BLD AUTO: 0.35 X10(3) UL (ref 0.1–1)
MONOCYTES NFR BLD AUTO: 9 %
NEUTROPHILS # BLD AUTO: 2.59 X10 (3) UL (ref 1.5–7.7)
NEUTROPHILS # BLD AUTO: 2.59 X10(3) UL (ref 1.5–7.7)
NEUTROPHILS NFR BLD AUTO: 66.1 %
NITRITE UR QL STRIP.AUTO: NEGATIVE
NONHDLC SERPL-MCNC: 94 MG/DL (ref ?–130)
OSMOLALITY SERPL CALC.SUM OF ELEC: 293 MOSM/KG (ref 275–295)
PATIENT FASTING: YES
PATIENT FASTING: YES
PH UR: 6 [PH] (ref 5–8)
PLATELET # BLD AUTO: 187 10(3)UL (ref 150–450)
POTASSIUM SERPL-SCNC: 4.1 MMOL/L (ref 3.5–5.1)
PROT UR-MCNC: NEGATIVE MG/DL
RBC # BLD AUTO: 4.56 X10(6)UL (ref 3.8–5.3)
RBC #/AREA URNS AUTO: 7 /HPF
SODIUM SERPL-SCNC: 141 MMOL/L (ref 136–145)
SP GR UR STRIP: 1.01 (ref 1–1.03)
TRIGL SERPL-MCNC: 127 MG/DL (ref 30–149)
UROBILINOGEN UR STRIP-ACNC: <2
VLDLC SERPL CALC-MCNC: 25 MG/DL (ref 0–30)
WBC # BLD AUTO: 3.9 X10(3) UL (ref 4–11)
WBC #/AREA URNS AUTO: 97 /HPF

## 2019-09-20 PROCEDURE — 81001 URINALYSIS AUTO W/SCOPE: CPT

## 2019-09-20 PROCEDURE — 80053 COMPREHEN METABOLIC PANEL: CPT

## 2019-09-20 PROCEDURE — 80061 LIPID PANEL: CPT

## 2019-09-20 PROCEDURE — 36415 COLL VENOUS BLD VENIPUNCTURE: CPT

## 2019-09-20 PROCEDURE — 85025 COMPLETE CBC W/AUTO DIFF WBC: CPT

## 2019-09-21 ENCOUNTER — TELEPHONE (OUTPATIENT)
Dept: NEPHROLOGY | Facility: CLINIC | Age: 74
End: 2019-09-21

## 2019-09-21 DIAGNOSIS — N39.0 URINARY TRACT INFECTION WITH HEMATURIA, SITE UNSPECIFIED: Primary | ICD-10-CM

## 2019-09-21 DIAGNOSIS — R31.9 URINARY TRACT INFECTION WITH HEMATURIA, SITE UNSPECIFIED: Primary | ICD-10-CM

## 2019-09-21 NOTE — TELEPHONE ENCOUNTER
Labs and cholesterol were good but urinalysis is consistent with UTI. Any symptoms? Do urine culture and sensitivity. Follow-up as scheduled.

## 2019-09-23 NOTE — TELEPHONE ENCOUNTER
Relayed message to patient. Pt is not experiencing any symptoms at this time (urgency/frequency/pain when urinating). Put order in for culture. Pt will complete this before her apt with Dr. Narayan North on Wednesday.

## 2019-09-25 ENCOUNTER — LAB ENCOUNTER (OUTPATIENT)
Dept: LAB | Facility: HOSPITAL | Age: 74
End: 2019-09-25
Attending: INTERNAL MEDICINE
Payer: MEDICARE

## 2019-09-25 ENCOUNTER — OFFICE VISIT (OUTPATIENT)
Dept: NEPHROLOGY | Facility: CLINIC | Age: 74
End: 2019-09-25
Payer: MEDICARE

## 2019-09-25 VITALS
HEIGHT: 65 IN | WEIGHT: 178.81 LBS | DIASTOLIC BLOOD PRESSURE: 71 MMHG | SYSTOLIC BLOOD PRESSURE: 110 MMHG | BODY MASS INDEX: 29.79 KG/M2 | HEART RATE: 74 BPM

## 2019-09-25 DIAGNOSIS — R31.9 URINARY TRACT INFECTION WITH HEMATURIA, SITE UNSPECIFIED: ICD-10-CM

## 2019-09-25 DIAGNOSIS — I10 ESSENTIAL HYPERTENSION: Primary | ICD-10-CM

## 2019-09-25 DIAGNOSIS — J45.909 MILD ASTHMA WITHOUT COMPLICATION, UNSPECIFIED WHETHER PERSISTENT: ICD-10-CM

## 2019-09-25 DIAGNOSIS — E78.00 PURE HYPERCHOLESTEROLEMIA: ICD-10-CM

## 2019-09-25 DIAGNOSIS — N39.0 URINARY TRACT INFECTION WITH HEMATURIA, SITE UNSPECIFIED: ICD-10-CM

## 2019-09-25 LAB
BILIRUB UR QL: NEGATIVE
CLARITY UR: CLEAR
COLOR UR: YELLOW
GLUCOSE UR-MCNC: NEGATIVE MG/DL
KETONES UR-MCNC: NEGATIVE MG/DL
NITRITE UR QL STRIP.AUTO: POSITIVE
PH UR: 5 [PH] (ref 5–8)
PROT UR-MCNC: NEGATIVE MG/DL
RBC #/AREA URNS AUTO: 2 /HPF
SP GR UR STRIP: 1.01 (ref 1–1.03)
UROBILINOGEN UR STRIP-ACNC: <2
WBC #/AREA URNS AUTO: 44 /HPF

## 2019-09-25 PROCEDURE — 87186 SC STD MICRODIL/AGAR DIL: CPT

## 2019-09-25 PROCEDURE — 99214 OFFICE O/P EST MOD 30 MIN: CPT | Performed by: INTERNAL MEDICINE

## 2019-09-25 PROCEDURE — 87086 URINE CULTURE/COLONY COUNT: CPT

## 2019-09-25 PROCEDURE — 87077 CULTURE AEROBIC IDENTIFY: CPT

## 2019-09-25 PROCEDURE — G0463 HOSPITAL OUTPT CLINIC VISIT: HCPCS | Performed by: INTERNAL MEDICINE

## 2019-09-25 PROCEDURE — 81001 URINALYSIS AUTO W/SCOPE: CPT

## 2019-09-26 NOTE — PROGRESS NOTES
[facility]  Nephrology Daily Progress Note    Pablo Santana  [id number]  75year old  Patient presents with:  Test Results      HPI:   Pablo Santana is a 75year old female.   54-year-old female with a history of hypertension, hypercholesterolemia, a supraclavicular or axillary adenopathy is noted  Respiratory: normal to inspection lungs are clear to auscultation bilaterally normal respiratory effort  Cardiovascular: regular rate and rhythm no murmurs, gallups, or rubs  Abdomen: soft non-tender non-dis NEEDED FOR WHEEZING AS DIRECTED BY PHYSICIAN, Disp: 24 g, Rfl: 0    Allergies:    Ciprofloxacin           JITTERY    Comment:Also got a headache  Cortisone                   Comment:Other reaction(s): Rash         ASSESSMENT/PLAN:   Assessment   Essential

## 2019-09-27 ENCOUNTER — TELEPHONE (OUTPATIENT)
Dept: NEPHROLOGY | Facility: CLINIC | Age: 74
End: 2019-09-27

## 2019-09-27 RX ORDER — NITROFURANTOIN 25; 75 MG/1; MG/1
100 CAPSULE ORAL 2 TIMES DAILY
Qty: 14 CAPSULE | Refills: 0 | Status: SHIPPED | OUTPATIENT
Start: 2019-09-27 | End: 2019-10-04

## 2019-09-27 NOTE — TELEPHONE ENCOUNTER
Spoke to pt and relayed Dr. Lauren Mcgowan message as shown below. Pt educated on route, dose and frequency of rx. Pt verbalized understanding of whole message and had no further questions at this time.

## 2019-09-29 ENCOUNTER — HOSPITAL ENCOUNTER (OUTPATIENT)
Dept: MAMMOGRAPHY | Facility: HOSPITAL | Age: 74
Discharge: HOME | End: 2019-09-29
Attending: INTERNAL MEDICINE
Payer: MEDICARE

## 2019-09-29 DIAGNOSIS — Z12.31 VISIT FOR SCREENING MAMMOGRAM: ICD-10-CM

## 2019-09-29 PROCEDURE — 77067 SCR MAMMO BI INCL CAD: CPT | Performed by: INTERNAL MEDICINE

## 2019-09-29 PROCEDURE — 77063 BREAST TOMOSYNTHESIS BI: CPT | Performed by: INTERNAL MEDICINE

## 2019-11-13 ENCOUNTER — TELEPHONE (OUTPATIENT)
Dept: NEPHROLOGY | Facility: CLINIC | Age: 74
End: 2019-11-13

## 2019-11-13 NOTE — TELEPHONE ENCOUNTER
This is an old recall and should not be affecting current refills.   Have her verify with her pharmacy

## 2019-11-13 NOTE — TELEPHONE ENCOUNTER
Patient states her rx for Losartan is part of the recall and wanted to know if there was an alternate medication Dr Rose Chakraborty will prescribe to her. She is requesting 90 days refills. Please call. Thank you.

## 2019-11-15 RX ORDER — LOSARTAN POTASSIUM 50 MG/1
TABLET ORAL
Qty: 90 TABLET | Refills: 1 | Status: SHIPPED | OUTPATIENT
Start: 2019-11-15 | End: 2020-02-10

## 2019-11-15 RX ORDER — LANSOPRAZOLE 30 MG/1
CAPSULE, DELAYED RELEASE ORAL
Qty: 90 CAPSULE | Refills: 1 | Status: SHIPPED | OUTPATIENT
Start: 2019-11-15 | End: 2020-02-08

## 2019-12-06 RX ORDER — SIMVASTATIN 40 MG
TABLET ORAL
Qty: 90 TABLET | Refills: 1 | Status: SHIPPED | OUTPATIENT
Start: 2019-12-06 | End: 2021-01-19

## 2019-12-06 RX ORDER — MONTELUKAST SODIUM 10 MG/1
TABLET ORAL
Qty: 90 TABLET | Refills: 1 | Status: SHIPPED | OUTPATIENT
Start: 2019-12-06 | End: 2020-03-28

## 2019-12-06 NOTE — TELEPHONE ENCOUNTER
LOV 9/25/19. Last lipid panel was done on 9/20/19.  RTC in 6 mos (3/2020) Refills pended and routed to Dr. Yehuda Bansal for approval.

## 2019-12-18 RX ORDER — FLUTICASONE PROPIONATE 50 MCG
SPRAY, SUSPENSION (ML) NASAL
Qty: 48 G | Refills: 3 | Status: SHIPPED | OUTPATIENT
Start: 2019-12-18 | End: 2021-09-08

## 2020-02-08 DIAGNOSIS — I10 ESSENTIAL HYPERTENSION: Primary | ICD-10-CM

## 2020-02-08 NOTE — TELEPHONE ENCOUNTER
Current Outpatient Medications   Medication Sig Dispense Refill   • LOSARTAN POTASSIUM 50 MG Oral Tab TAKE 1 TABLET BY MOUTH EVERY DAY 90 tablet 1

## 2020-02-10 RX ORDER — LOSARTAN POTASSIUM 50 MG/1
50 TABLET ORAL
Qty: 90 TABLET | Refills: 0 | Status: SHIPPED | OUTPATIENT
Start: 2020-02-10 | End: 2020-04-13

## 2020-02-10 RX ORDER — LANSOPRAZOLE 30 MG/1
30 CAPSULE, DELAYED RELEASE ORAL
Qty: 90 CAPSULE | Refills: 0 | Status: SHIPPED | OUTPATIENT
Start: 2020-02-10 | End: 2020-05-12

## 2020-03-14 ENCOUNTER — APPOINTMENT (OUTPATIENT)
Dept: LAB | Age: 75
End: 2020-03-14
Attending: INTERNAL MEDICINE
Payer: MEDICARE

## 2020-03-14 DIAGNOSIS — I10 ESSENTIAL HYPERTENSION: ICD-10-CM

## 2020-03-14 LAB
ALBUMIN SERPL-MCNC: 4.2 G/DL (ref 3.4–5)
ALBUMIN/GLOB SERPL: 1.2 {RATIO} (ref 1–2)
ALP LIVER SERPL-CCNC: 111 U/L (ref 55–142)
ALT SERPL-CCNC: 21 U/L (ref 13–56)
ANION GAP SERPL CALC-SCNC: 6 MMOL/L (ref 0–18)
AST SERPL-CCNC: 16 U/L (ref 15–37)
BILIRUB SERPL-MCNC: 0.8 MG/DL (ref 0.1–2)
BUN BLD-MCNC: 22 MG/DL (ref 7–18)
BUN/CREAT SERPL: 22.9 (ref 10–20)
CALCIUM BLD-MCNC: 9.7 MG/DL (ref 8.5–10.1)
CHLORIDE SERPL-SCNC: 107 MMOL/L (ref 98–112)
CHOLEST SMN-MCNC: 154 MG/DL (ref ?–200)
CO2 SERPL-SCNC: 28 MMOL/L (ref 21–32)
CREAT BLD-MCNC: 0.96 MG/DL (ref 0.55–1.02)
GLOBULIN PLAS-MCNC: 3.4 G/DL (ref 2.8–4.4)
GLUCOSE BLD-MCNC: 97 MG/DL (ref 70–99)
HDLC SERPL-MCNC: 64 MG/DL (ref 40–59)
LDLC SERPL CALC-MCNC: 70 MG/DL (ref ?–100)
M PROTEIN MFR SERPL ELPH: 7.6 G/DL (ref 6.4–8.2)
NONHDLC SERPL-MCNC: 90 MG/DL (ref ?–130)
OSMOLALITY SERPL CALC.SUM OF ELEC: 295 MOSM/KG (ref 275–295)
PATIENT FASTING Y/N/NP: YES
PATIENT FASTING Y/N/NP: YES
POTASSIUM SERPL-SCNC: 4.1 MMOL/L (ref 3.5–5.1)
SODIUM SERPL-SCNC: 141 MMOL/L (ref 136–145)
TRIGL SERPL-MCNC: 102 MG/DL (ref 30–149)
VLDLC SERPL CALC-MCNC: 20 MG/DL (ref 0–30)

## 2020-03-14 PROCEDURE — 80053 COMPREHEN METABOLIC PANEL: CPT

## 2020-03-14 PROCEDURE — 80061 LIPID PANEL: CPT

## 2020-03-14 PROCEDURE — 36415 COLL VENOUS BLD VENIPUNCTURE: CPT

## 2020-03-30 RX ORDER — MONTELUKAST SODIUM 10 MG/1
TABLET ORAL
Qty: 90 TABLET | Refills: 0 | Status: SHIPPED | OUTPATIENT
Start: 2020-03-30 | End: 2020-05-15

## 2020-04-13 RX ORDER — LOSARTAN POTASSIUM 50 MG/1
TABLET ORAL
Qty: 90 TABLET | Refills: 0 | Status: SHIPPED | OUTPATIENT
Start: 2020-04-13 | End: 2020-05-15

## 2020-04-13 NOTE — TELEPHONE ENCOUNTER
Last seen 9/25/19. RTC in 6 mos (3/2020) Appt for March cancelled. Refill pended and routed to Dr. John Dee.

## 2020-05-12 RX ORDER — LANSOPRAZOLE 30 MG/1
CAPSULE, DELAYED RELEASE ORAL
Qty: 90 CAPSULE | Refills: 0 | Status: SHIPPED | OUTPATIENT
Start: 2020-05-12 | End: 2020-08-10

## 2020-05-12 NOTE — TELEPHONE ENCOUNTER
Last seen 9/25/19. RTC in 6 mos (3/2020) F/U scheduled for 6/3/2020. Refill pended and routed to Dr. Lloyd Mooney.

## 2020-05-14 ENCOUNTER — TELEPHONE (OUTPATIENT)
Dept: OPHTHALMOLOGY | Facility: CLINIC | Age: 75
End: 2020-05-14

## 2020-05-14 NOTE — TELEPHONE ENCOUNTER
Pt called stating pt's eyes are red, hurt and jacquie feeling. Pt requesting an appointment. Pt stated pt had seen Dr. Zen Alvarez in the past for same problem.   Please call

## 2020-05-14 NOTE — TELEPHONE ENCOUNTER
Spoke with patient, complains of jacquie feeling in eyes OU with some slight redness OU x 4 days. Pt states she does have allergies and tried allergy drops but they sting and did not help. Pt saw GELACIO msny years ago and was told she had blepharitis, was told to do lid scrubs with baby shampoo every night which she states she does every night. Advised patient to use warm compresses and artificial tears OU up to four times a day for one week. She was told to call back if there is no improvement after a week or if she has any other questions.

## 2020-05-15 RX ORDER — MONTELUKAST SODIUM 10 MG/1
TABLET ORAL
Qty: 90 TABLET | Refills: 0 | Status: SHIPPED | OUTPATIENT
Start: 2020-05-15 | End: 2020-08-11

## 2020-05-15 RX ORDER — LOSARTAN POTASSIUM 50 MG/1
50 TABLET ORAL DAILY
Qty: 90 TABLET | Refills: 0 | Status: SHIPPED | OUTPATIENT
Start: 2020-05-15 | End: 2020-07-16

## 2020-05-15 NOTE — TELEPHONE ENCOUNTER
Current Outpatient Medications   Medication Sig Dispense Refill   • LOSARTAN POTASSIUM 50 MG Oral Tab TAKE 1 TABLET BY MOUTH ONCE DAILY 90 tablet 0   • Montelukast Sodium 10 MG Oral Tab TAKE 1 TABLET BY MOUTH EVERY NIGHT 90 tablet 0

## 2020-05-15 NOTE — TELEPHONE ENCOUNTER
Last seen 9/25/19. RTC in 6 mos (3/2020) F/U scheduled for 6/3/2020. Refill pended and routed to Dr. Nadeen Wilkinson.

## 2020-06-03 ENCOUNTER — OFFICE VISIT (OUTPATIENT)
Dept: NEPHROLOGY | Facility: CLINIC | Age: 75
End: 2020-06-03
Payer: COMMERCIAL

## 2020-06-03 VITALS
DIASTOLIC BLOOD PRESSURE: 76 MMHG | SYSTOLIC BLOOD PRESSURE: 128 MMHG | HEIGHT: 65 IN | BODY MASS INDEX: 29.89 KG/M2 | HEART RATE: 72 BPM | WEIGHT: 179.38 LBS

## 2020-06-03 DIAGNOSIS — Z00.00 MEDICARE ANNUAL WELLNESS VISIT, SUBSEQUENT: Primary | ICD-10-CM

## 2020-06-03 DIAGNOSIS — E78.00 PURE HYPERCHOLESTEROLEMIA: ICD-10-CM

## 2020-06-03 DIAGNOSIS — I10 ESSENTIAL HYPERTENSION: ICD-10-CM

## 2020-06-03 PROCEDURE — 99397 PER PM REEVAL EST PAT 65+ YR: CPT | Performed by: INTERNAL MEDICINE

## 2020-06-03 PROCEDURE — G0439 PPPS, SUBSEQ VISIT: HCPCS | Performed by: INTERNAL MEDICINE

## 2020-06-03 PROCEDURE — 96160 PT-FOCUSED HLTH RISK ASSMT: CPT | Performed by: INTERNAL MEDICINE

## 2020-06-03 NOTE — PROGRESS NOTES
[facility], [facility]  Nephrology Daily Progress Note    Avi Braga  [id number]  75year old  Patient presents with: Well Adult: Medicae Annual Disputanta Punches      HPI:   Avi Braga is a 75year old female.   48-year-old female with a history hypertension, hype ONLY 3/25/2019 9/25/2019 6/3/2020   Weight 176 lbs 3 oz 178 lbs 13 oz 179 lbs 6 oz       Constitutional: appears well hydrated alert and responsive no acute distress noted  Neck/Thyroid: neck is supple without adenopathy  Lymphatic: no abnormal cervical, s 1-2 tablets (5-10 mg total) by mouth 3 (three) times daily as needed for Muscle spasms.  (Patient not taking: Reported on 6/3/2020 ), Disp: 30 tablet, Rfl: 0    Allergies:    Ciprofloxacin           JITTERY    Comment:Also got a headache  Cortisone

## 2020-07-16 RX ORDER — LOSARTAN POTASSIUM 50 MG/1
TABLET ORAL
Qty: 90 TABLET | Refills: 1 | Status: SHIPPED | OUTPATIENT
Start: 2020-07-16 | End: 2020-08-10

## 2020-08-10 ENCOUNTER — TELEPHONE (OUTPATIENT)
Dept: NEPHROLOGY | Facility: CLINIC | Age: 75
End: 2020-08-10

## 2020-08-10 RX ORDER — LOSARTAN POTASSIUM 50 MG/1
50 TABLET ORAL DAILY
Qty: 90 TABLET | Refills: 1 | Status: SHIPPED | OUTPATIENT
Start: 2020-08-10 | End: 2020-08-11

## 2020-08-10 RX ORDER — LANSOPRAZOLE 30 MG/1
30 CAPSULE, DELAYED RELEASE ORAL
Qty: 90 CAPSULE | Refills: 0 | Status: SHIPPED | OUTPATIENT
Start: 2020-08-10 | End: 2020-08-11

## 2020-08-11 ENCOUNTER — TELEPHONE (OUTPATIENT)
Dept: NEPHROLOGY | Facility: CLINIC | Age: 75
End: 2020-08-11

## 2020-08-11 DIAGNOSIS — Z12.31 VISIT FOR SCREENING MAMMOGRAM: Primary | ICD-10-CM

## 2020-08-11 RX ORDER — MONTELUKAST SODIUM 10 MG/1
TABLET ORAL
Qty: 90 TABLET | Refills: 3 | Status: SHIPPED | OUTPATIENT
Start: 2020-08-11 | End: 2020-10-15

## 2020-08-11 RX ORDER — LOSARTAN POTASSIUM 50 MG/1
50 TABLET ORAL DAILY
Qty: 90 TABLET | Refills: 1 | Status: SHIPPED | OUTPATIENT
Start: 2020-08-11 | End: 2020-12-28

## 2020-08-11 RX ORDER — LANSOPRAZOLE 30 MG/1
CAPSULE, DELAYED RELEASE ORAL
Qty: 90 CAPSULE | Refills: 0 | Status: SHIPPED | OUTPATIENT
Start: 2020-08-11 | End: 2020-11-05

## 2020-08-11 RX ORDER — LANSOPRAZOLE 30 MG/1
30 CAPSULE, DELAYED RELEASE ORAL
Qty: 90 CAPSULE | Refills: 0 | Status: SHIPPED | OUTPATIENT
Start: 2020-08-11 | End: 2020-08-11

## 2020-08-11 NOTE — TELEPHONE ENCOUNTER
Pt would like this RX sent to [facility]. Please call when RX is sent.

## 2020-10-16 RX ORDER — MONTELUKAST SODIUM 10 MG/1
TABLET ORAL
Qty: 90 TABLET | Refills: 1 | Status: SHIPPED | OUTPATIENT
Start: 2020-10-16 | End: 2021-01-28

## 2020-11-05 RX ORDER — LANSOPRAZOLE 30 MG/1
CAPSULE, DELAYED RELEASE ORAL
Qty: 90 CAPSULE | Refills: 1 | Status: SHIPPED | OUTPATIENT
Start: 2020-11-05 | End: 2021-05-06

## 2020-11-21 ENCOUNTER — HOSPITAL ENCOUNTER (OUTPATIENT)
Dept: MAMMOGRAPHY | Age: 75
Discharge: HOME | End: 2020-11-21
Attending: INTERNAL MEDICINE
Payer: MEDICARE

## 2020-11-21 DIAGNOSIS — Z12.31 VISIT FOR SCREENING MAMMOGRAM: ICD-10-CM

## 2020-11-21 PROCEDURE — 77067 SCR MAMMO BI INCL CAD: CPT | Performed by: INTERNAL MEDICINE

## 2020-11-21 PROCEDURE — 77063 BREAST TOMOSYNTHESIS BI: CPT | Performed by: INTERNAL MEDICINE

## 2020-12-18 ENCOUNTER — OFFICE VISIT (OUTPATIENT)
Dept: NEPHROLOGY | Facility: CLINIC | Age: 75
End: 2020-12-18
Payer: COMMERCIAL

## 2020-12-18 VITALS
DIASTOLIC BLOOD PRESSURE: 85 MMHG | HEART RATE: 78 BPM | SYSTOLIC BLOOD PRESSURE: 171 MMHG | WEIGHT: 184.38 LBS | BODY MASS INDEX: 31 KG/M2

## 2020-12-18 DIAGNOSIS — E78.00 PURE HYPERCHOLESTEROLEMIA: ICD-10-CM

## 2020-12-18 DIAGNOSIS — I10 ESSENTIAL HYPERTENSION: Primary | ICD-10-CM

## 2020-12-18 PROCEDURE — 3079F DIAST BP 80-89 MM HG: CPT | Performed by: INTERNAL MEDICINE

## 2020-12-18 PROCEDURE — G0463 HOSPITAL OUTPT CLINIC VISIT: HCPCS | Performed by: INTERNAL MEDICINE

## 2020-12-18 PROCEDURE — 3077F SYST BP >= 140 MM HG: CPT | Performed by: INTERNAL MEDICINE

## 2020-12-18 PROCEDURE — 99214 OFFICE O/P EST MOD 30 MIN: CPT | Performed by: INTERNAL MEDICINE

## 2020-12-20 NOTE — PROGRESS NOTES
[facility], [facility]  Nephrology Daily Progress Note    Jennie Davalos  [id number]  75year old  Patient presents with: Follow - Up      HPI:   Jennie Davalos is a 75year old female.   30-year-old female with a history of hypertension, hypercholesterolemia, al 184 lbs 6 oz       Constitutional: appears well hydrated alert and responsive no acute distress noted  Neck/Thyroid: neck is supple without adenopathy  Lymphatic: no abnormal cervical, supraclavicular or axillary adenopathy is noted  Respiratory: normal to 0    Allergies:    Ciprofloxacin           JITTERY    Comment:Also got a headache  Cortisone                   Comment:Other reaction(s): Rash         ASSESSMENT/PLAN:   Assessment   Essential hypertension  (primary encounter diagnosis)  Pure hypercholeste

## 2020-12-20 NOTE — PATIENT INSTRUCTIONS
Check your blood pressures daily and call me in 1 week. Try to walk on a regular basis. Please do follow-up labs as ordered. Fast for 12 hours.

## 2020-12-22 ENCOUNTER — LAB ENCOUNTER (OUTPATIENT)
Dept: LAB | Age: 75
End: 2020-12-22
Attending: INTERNAL MEDICINE
Payer: MEDICARE

## 2020-12-22 DIAGNOSIS — E78.00 PURE HYPERCHOLESTEROLEMIA: ICD-10-CM

## 2020-12-22 DIAGNOSIS — I10 ESSENTIAL HYPERTENSION: ICD-10-CM

## 2020-12-22 PROCEDURE — 85025 COMPLETE CBC W/AUTO DIFF WBC: CPT

## 2020-12-22 PROCEDURE — 81001 URINALYSIS AUTO W/SCOPE: CPT

## 2020-12-22 PROCEDURE — 80061 LIPID PANEL: CPT

## 2020-12-22 PROCEDURE — 36415 COLL VENOUS BLD VENIPUNCTURE: CPT

## 2020-12-22 PROCEDURE — 80053 COMPREHEN METABOLIC PANEL: CPT

## 2020-12-24 ENCOUNTER — TELEPHONE (OUTPATIENT)
Dept: NEPHROLOGY | Facility: CLINIC | Age: 75
End: 2020-12-24

## 2020-12-24 DIAGNOSIS — R82.90 ABNORMAL URINALYSIS: Primary | ICD-10-CM

## 2020-12-24 NOTE — TELEPHONE ENCOUNTER
Labs okay except kidney function now mildly abnormal.  Make sure she is drinking plenty of fluids. Avoid nonsteroidals. We need to make sure her blood pressures are under good control. How are blood pressure readings doing? Magnus Recio Urinalysis suggest UTI.   Do

## 2020-12-28 RX ORDER — LOSARTAN POTASSIUM 50 MG/1
25 TABLET ORAL DAILY
Qty: 90 TABLET | Refills: 1 | COMMUNITY
Start: 2020-12-28 | End: 2021-01-05

## 2020-12-28 NOTE — TELEPHONE ENCOUNTER
Spoke with patient and she verbalizes understanding. Denies taking NSAIDs. She will have urine culture done in a couple days, complaining of frequency.     Reports BPs:  12/20: 123/62. 75  12/21: 92/47, 49  12/22: 87/53, 74   12/23: 106/69, 70  12/27: 115/7

## 2020-12-30 ENCOUNTER — LAB ENCOUNTER (OUTPATIENT)
Dept: LAB | Age: 75
End: 2020-12-30
Attending: INTERNAL MEDICINE
Payer: MEDICARE

## 2020-12-30 DIAGNOSIS — R82.90 ABNORMAL URINALYSIS: ICD-10-CM

## 2020-12-30 PROCEDURE — 87077 CULTURE AEROBIC IDENTIFY: CPT

## 2020-12-30 PROCEDURE — 87186 SC STD MICRODIL/AGAR DIL: CPT

## 2020-12-30 PROCEDURE — 87086 URINE CULTURE/COLONY COUNT: CPT

## 2021-01-03 ENCOUNTER — TELEPHONE (OUTPATIENT)
Dept: NEPHROLOGY | Facility: CLINIC | Age: 76
End: 2021-01-03

## 2021-01-04 RX ORDER — NITROFURANTOIN 25; 75 MG/1; MG/1
100 CAPSULE ORAL 2 TIMES DAILY
Qty: 10 CAPSULE | Refills: 0 | Status: SHIPPED | OUTPATIENT
Start: 2021-01-04 | End: 2021-01-09

## 2021-01-04 NOTE — TELEPHONE ENCOUNTER
Patient called to provide 7 days worth of BPs. All checked in the morning. Please review. Thank you.     12/29: 110/75 p 71  12/30: 122/79 p. 72  12/31: 124/82 p. 71  1/1:  117/80  p 80  1/2:  129/83  p. 80  1/3: 125/77   p. 67  1/4: 115/72   p 63

## 2021-01-04 NOTE — TELEPHONE ENCOUNTER
Spoke to patient and relayed Dr. Iliana Torrez message as shown below. Patient was educated on route, dose, and frequency of medication. Patient verbalized understanding of whole message and had no further questions at this time. rx sent to pharmacy.

## 2021-01-04 NOTE — TELEPHONE ENCOUNTER
Spoke to patient and relayed Dr. Antonella Mata message as shown below. Patient verbalized understanding of whole message and had no further questions at this time.

## 2021-01-05 RX ORDER — LOSARTAN POTASSIUM 50 MG/1
50 TABLET ORAL DAILY
Qty: 90 TABLET | Refills: 1 | Status: SHIPPED | OUTPATIENT
Start: 2021-01-05 | End: 2021-11-02

## 2021-01-19 RX ORDER — SIMVASTATIN 40 MG
40 TABLET ORAL EVERY EVENING
Qty: 90 TABLET | Refills: 1 | Status: SHIPPED | OUTPATIENT
Start: 2021-01-19 | End: 2021-12-16

## 2021-01-19 NOTE — TELEPHONE ENCOUNTER
Need refill for Simvastatin - Please send Rx to [facility] [place]. Pt. States that she needs to get a 90 day supply.      Current Outpatient Medications   Medication Sig Dispense Refill   •    1   •    1   •    1   •    3   • SIMVASTATIN 40 MG Or

## 2021-01-19 NOTE — TELEPHONE ENCOUNTER
Rx request for Simvastatin 40 mg, please review and sign off if appropriate. Thank you.     Last office visit: 12/18/20  Lipid panel done: 12/22/2020  Return to clinic: June 2021  Last Refill: 12/6/19

## 2021-01-28 RX ORDER — MONTELUKAST SODIUM 10 MG/1
TABLET ORAL
Qty: 90 TABLET | Refills: 1 | Status: SHIPPED | OUTPATIENT
Start: 2021-01-28 | End: 2021-08-10

## 2021-01-28 NOTE — TELEPHONE ENCOUNTER
Last seen 12/18/2020. Return to clinic in 6 months (6/21) Refill(s) pended and routed to Dr. Edda Flores.

## 2021-01-28 NOTE — TELEPHONE ENCOUNTER
[facility] pharmacy called in to get refill for medication Montelukast Sodium 10 MG Oral Tab.  Please follow up

## 2021-03-04 DIAGNOSIS — Z23 NEED FOR VACCINATION: ICD-10-CM

## 2021-03-12 ENCOUNTER — TELEPHONE (OUTPATIENT)
Dept: NEPHROLOGY | Facility: CLINIC | Age: 76
End: 2021-03-12

## 2021-03-12 NOTE — TELEPHONE ENCOUNTER
Pt called to let this office know that she received the 1st dose of Covid vaccine on 2/13/21 and 2nd dose on 3/6/21. It was done at the [facility] and it was the [facility].

## 2021-05-06 RX ORDER — LANSOPRAZOLE 30 MG/1
30 CAPSULE, DELAYED RELEASE ORAL
Qty: 90 CAPSULE | Refills: 0 | Status: SHIPPED | OUTPATIENT
Start: 2021-05-06 | End: 2021-08-09

## 2021-05-06 NOTE — TELEPHONE ENCOUNTER
Last seen 12/18/2020. Return to clinic in 6 months (6/21) No follow up scheduled. Refill(s) pended and routed to Dr. Haim Juarez.

## 2021-06-07 ENCOUNTER — OFFICE VISIT (OUTPATIENT)
Dept: NEPHROLOGY | Facility: CLINIC | Age: 76
End: 2021-06-07
Payer: COMMERCIAL

## 2021-06-07 VITALS
SYSTOLIC BLOOD PRESSURE: 138 MMHG | HEART RATE: 83 BPM | WEIGHT: 178 LBS | BODY MASS INDEX: 29.66 KG/M2 | DIASTOLIC BLOOD PRESSURE: 84 MMHG | HEIGHT: 65 IN

## 2021-06-07 DIAGNOSIS — Z00.00 MEDICARE ANNUAL WELLNESS VISIT, SUBSEQUENT: Primary | ICD-10-CM

## 2021-06-07 DIAGNOSIS — E78.00 PURE HYPERCHOLESTEROLEMIA: ICD-10-CM

## 2021-06-07 DIAGNOSIS — I10 ESSENTIAL HYPERTENSION: ICD-10-CM

## 2021-06-07 PROCEDURE — 96160 PT-FOCUSED HLTH RISK ASSMT: CPT | Performed by: INTERNAL MEDICINE

## 2021-06-07 PROCEDURE — G0439 PPPS, SUBSEQ VISIT: HCPCS | Performed by: INTERNAL MEDICINE

## 2021-06-07 PROCEDURE — 3079F DIAST BP 80-89 MM HG: CPT | Performed by: INTERNAL MEDICINE

## 2021-06-07 PROCEDURE — 3075F SYST BP GE 130 - 139MM HG: CPT | Performed by: INTERNAL MEDICINE

## 2021-06-07 PROCEDURE — 3008F BODY MASS INDEX DOCD: CPT | Performed by: INTERNAL MEDICINE

## 2021-06-07 PROCEDURE — 99397 PER PM REEVAL EST PAT 65+ YR: CPT | Performed by: INTERNAL MEDICINE

## 2021-06-07 NOTE — PROGRESS NOTES
[facility], [facility]  Nephrology Daily Progress Note    Tri Lambert  [id number]  67year old  Patient presents with: Follow - Up: follow up annual check up      HPI:   Tri Lambert is a 67year old female.   68-year-old female with a history of hypertensio lbs       Constitutional: appears well hydrated alert and responsive no acute distress noted  Neck/Thyroid: neck is supple without adenopathy  Lymphatic: no abnormal cervical, supraclavicular or axillary adenopathy is noted  Respiratory: normal to inspecti Base) MCG/ACT Inhalation Aero Soln, USE 1 INHALATION EVERY SIX HOURS AS NEEDED FOR WHEEZING AS DIRECTED BY PHYSICIAN, Disp: 24 g, Rfl: 0    Allergies:    Ciprofloxacin           JITTERY    Comment:Also got a headache  Cortisone                   Comment: Ot

## 2021-06-16 ENCOUNTER — LAB ENCOUNTER (OUTPATIENT)
Dept: LAB | Age: 76
End: 2021-06-16
Attending: INTERNAL MEDICINE
Payer: MEDICARE

## 2021-06-16 DIAGNOSIS — E78.00 PURE HYPERCHOLESTEROLEMIA: ICD-10-CM

## 2021-06-16 DIAGNOSIS — I10 ESSENTIAL HYPERTENSION: ICD-10-CM

## 2021-06-16 PROCEDURE — 80053 COMPREHEN METABOLIC PANEL: CPT

## 2021-06-16 PROCEDURE — 80061 LIPID PANEL: CPT

## 2021-06-16 PROCEDURE — 85025 COMPLETE CBC W/AUTO DIFF WBC: CPT

## 2021-06-16 PROCEDURE — 36415 COLL VENOUS BLD VENIPUNCTURE: CPT

## 2021-08-09 RX ORDER — LANSOPRAZOLE 30 MG/1
30 CAPSULE, DELAYED RELEASE ORAL
Qty: 90 CAPSULE | Refills: 1 | Status: SHIPPED | OUTPATIENT
Start: 2021-08-09 | End: 2022-02-28

## 2021-08-09 RX ORDER — ALBUTEROL SULFATE 90 UG/1
1 AEROSOL, METERED RESPIRATORY (INHALATION) EVERY 6 HOURS PRN
Qty: 24 G | Refills: 1 | Status: SHIPPED | OUTPATIENT
Start: 2021-08-09

## 2021-08-10 RX ORDER — MONTELUKAST SODIUM 10 MG/1
TABLET ORAL
Qty: 90 TABLET | Refills: 1 | Status: SHIPPED | OUTPATIENT
Start: 2021-08-10

## 2021-09-08 RX ORDER — FLUTICASONE PROPIONATE 50 MCG
2 SPRAY, SUSPENSION (ML) NASAL DAILY
Qty: 48 G | Refills: 3 | Status: SHIPPED | OUTPATIENT
Start: 2021-09-08

## 2021-09-08 NOTE — TELEPHONE ENCOUNTER
Last seen 6/7/21. Return to clinic in 6 months (12/21) Refill(s) pended and routed to Dr. Davy Cates.

## 2021-11-02 RX ORDER — LOSARTAN POTASSIUM 50 MG/1
TABLET ORAL
Qty: 90 TABLET | Refills: 0 | Status: SHIPPED | OUTPATIENT
Start: 2021-11-02

## 2021-11-05 ENCOUNTER — TELEPHONE (OUTPATIENT)
Dept: NEPHROLOGY | Facility: CLINIC | Age: 76
End: 2021-11-05

## 2021-11-05 DIAGNOSIS — Z12.31 VISIT FOR SCREENING MAMMOGRAM: Primary | ICD-10-CM

## 2021-12-16 NOTE — TELEPHONE ENCOUNTER
LOV  6/7/21  RTC  6 Months  (12/7/21  F/U No schedule    Please confirm you keeping this patient ? ?

## 2021-12-17 RX ORDER — SIMVASTATIN 40 MG
TABLET ORAL
Qty: 90 TABLET | Refills: 0 | Status: SHIPPED | OUTPATIENT
Start: 2021-12-17

## 2022-02-28 ENCOUNTER — TELEPHONE (OUTPATIENT)
Dept: INTERNAL MEDICINE CLINIC | Facility: CLINIC | Age: 77
End: 2022-02-28

## 2022-02-28 RX ORDER — LANSOPRAZOLE 30 MG/1
30 CAPSULE, DELAYED RELEASE ORAL
Qty: 90 CAPSULE | Refills: 0 | Status: SHIPPED | OUTPATIENT
Start: 2022-02-28

## 2022-02-28 NOTE — TELEPHONE ENCOUNTER
Reached patient for medication adherence consult. Per insurance report, patient was NOT adherent with simvastatin last year in 2021. Patient states she is still taking this medication, 1 tablet daily. When asked why the medication was only filled 2x last year, patient states at one point she was taking half tablet daily. Her cholesterol numbers worsened so her dose was increased back to 40 mg - 1 tablet daily. She denies forgetting or missing medication doses. Did provide education and stressed the importance of taking medication just like prescribed to get the most benefit. Patient is concerned about running out of her lansoprazole prior to her new patient appt with new PCP in April. Patient tells me she has enough of her other medications to get thru until appt on 4/2/22, however will run out of lansoprazole. Will pend for PCP review to preferred pharmacy. Patient denies any other questions or concerns with medications at this time.

## 2022-04-02 ENCOUNTER — OFFICE VISIT (OUTPATIENT)
Dept: INTERNAL MEDICINE CLINIC | Facility: CLINIC | Age: 77
End: 2022-04-02
Payer: COMMERCIAL

## 2022-04-02 VITALS
HEIGHT: 65 IN | SYSTOLIC BLOOD PRESSURE: 138 MMHG | HEART RATE: 97 BPM | BODY MASS INDEX: 28.66 KG/M2 | OXYGEN SATURATION: 99 % | WEIGHT: 172 LBS | DIASTOLIC BLOOD PRESSURE: 80 MMHG

## 2022-04-02 DIAGNOSIS — E78.2 MIXED HYPERLIPIDEMIA: ICD-10-CM

## 2022-04-02 DIAGNOSIS — Z12.31 SCREENING MAMMOGRAM, ENCOUNTER FOR: ICD-10-CM

## 2022-04-02 DIAGNOSIS — K21.00 GASTROESOPHAGEAL REFLUX DISEASE WITH ESOPHAGITIS WITHOUT HEMORRHAGE: ICD-10-CM

## 2022-04-02 DIAGNOSIS — Z00.00 MEDICARE ANNUAL WELLNESS VISIT, SUBSEQUENT: Primary | ICD-10-CM

## 2022-04-02 DIAGNOSIS — M79.7 FIBROMYALGIA: ICD-10-CM

## 2022-04-02 DIAGNOSIS — I10 PRIMARY HYPERTENSION: ICD-10-CM

## 2022-04-02 PROCEDURE — G0439 PPPS, SUBSEQ VISIT: HCPCS | Performed by: INTERNAL MEDICINE

## 2022-04-02 PROCEDURE — 3008F BODY MASS INDEX DOCD: CPT | Performed by: INTERNAL MEDICINE

## 2022-04-02 PROCEDURE — 3079F DIAST BP 80-89 MM HG: CPT | Performed by: INTERNAL MEDICINE

## 2022-04-02 PROCEDURE — 99397 PER PM REEVAL EST PAT 65+ YR: CPT | Performed by: INTERNAL MEDICINE

## 2022-04-02 PROCEDURE — 96160 PT-FOCUSED HLTH RISK ASSMT: CPT | Performed by: INTERNAL MEDICINE

## 2022-04-02 PROCEDURE — 3075F SYST BP GE 130 - 139MM HG: CPT | Performed by: INTERNAL MEDICINE

## 2022-04-03 LAB
ABSOLUTE BASOPHILS: 9 CELLS/UL (ref 0–200)
ABSOLUTE EOSINOPHILS: 32 CELLS/UL (ref 15–500)
ABSOLUTE LYMPHOCYTES: 968 CELLS/UL (ref 850–3900)
ABSOLUTE MONOCYTES: 414 CELLS/UL (ref 200–950)
ABSOLUTE NEUTROPHILS: 3078 CELLS/UL (ref 1500–7800)
ALBUMIN/GLOBULIN RATIO: 1.6 (CALC) (ref 1–2.5)
ALBUMIN: 4.6 G/DL (ref 3.6–5.1)
ALKALINE PHOSPHATASE: 95 U/L (ref 37–153)
ALT: 15 U/L (ref 6–29)
AST: 19 U/L (ref 10–35)
BASOPHILS: 0.2 %
BILIRUBIN, TOTAL: 1 MG/DL (ref 0.2–1.2)
BUN/CREATININE RATIO: 16 (CALC) (ref 6–22)
BUN: 20 MG/DL (ref 7–25)
CALCIUM: 10 MG/DL (ref 8.6–10.4)
CARBON DIOXIDE: 24 MMOL/L (ref 20–32)
CHLORIDE: 105 MMOL/L (ref 98–110)
CHOL/HDLC RATIO: 2.6 (CALC)
CHOLESTEROL, TOTAL: 150 MG/DL
CREATININE: 1.22 MG/DL (ref 0.6–0.93)
EGFR IF AFRICN AM: 49 ML/MIN/1.73M2
EGFR IF NONAFRICN AM: 43 ML/MIN/1.73M2
EOSINOPHILS: 0.7 %
GLOBULIN: 2.9 G/DL (CALC) (ref 1.9–3.7)
GLUCOSE: 98 MG/DL (ref 65–99)
HDL CHOLESTEROL: 58 MG/DL
HEMATOCRIT: 42.2 % (ref 35–45)
HEMOGLOBIN: 14.7 G/DL (ref 11.7–15.5)
LDL-CHOLESTEROL: 71 MG/DL (CALC)
LYMPHOCYTES: 21.5 %
MCH: 31.2 PG (ref 27–33)
MCHC: 34.8 G/DL (ref 32–36)
MCV: 89.6 FL (ref 80–100)
MONOCYTES: 9.2 %
MPV: 10.9 FL (ref 7.5–12.5)
NEUTROPHILS: 68.4 %
NON-HDL CHOLESTEROL: 92 MG/DL (CALC)
PLATELET COUNT: 196 THOUSAND/UL (ref 140–400)
POTASSIUM: 4.2 MMOL/L (ref 3.5–5.3)
PROTEIN, TOTAL: 7.5 G/DL (ref 6.1–8.1)
RDW: 12.9 % (ref 11–15)
RED BLOOD CELL COUNT: 4.71 MILLION/UL (ref 3.8–5.1)
SODIUM: 140 MMOL/L (ref 135–146)
TSH W/REFLEX TO FT4: 1.91 MIU/L (ref 0.4–4.5)
WHITE BLOOD CELL COUNT: 4.5 THOUSAND/UL (ref 3.8–10.8)

## 2022-04-21 RX ORDER — SIMVASTATIN 40 MG
40 TABLET ORAL EVERY EVENING
Qty: 90 TABLET | Refills: 0 | Status: SHIPPED | OUTPATIENT
Start: 2022-04-21

## 2022-04-21 RX ORDER — MONTELUKAST SODIUM 10 MG/1
TABLET ORAL
Qty: 90 TABLET | Refills: 1 | Status: SHIPPED | OUTPATIENT
Start: 2022-04-21

## 2022-04-21 RX ORDER — LOSARTAN POTASSIUM 50 MG/1
50 TABLET ORAL DAILY
Qty: 90 TABLET | Refills: 0 | Status: SHIPPED | OUTPATIENT
Start: 2022-04-21

## 2022-04-30 ENCOUNTER — HOSPITAL ENCOUNTER (OUTPATIENT)
Dept: MAMMOGRAPHY | Facility: HOSPITAL | Age: 77
Discharge: HOME | End: 2022-04-30
Attending: INTERNAL MEDICINE
Payer: MEDICARE

## 2022-04-30 DIAGNOSIS — Z12.31 SCREENING MAMMOGRAM, ENCOUNTER FOR: ICD-10-CM

## 2022-04-30 PROCEDURE — 77063 BREAST TOMOSYNTHESIS BI: CPT | Performed by: INTERNAL MEDICINE

## 2022-04-30 PROCEDURE — 77067 SCR MAMMO BI INCL CAD: CPT | Performed by: INTERNAL MEDICINE

## 2022-06-20 ENCOUNTER — PATIENT MESSAGE (OUTPATIENT)
Dept: INTERNAL MEDICINE CLINIC | Facility: CLINIC | Age: 77
End: 2022-06-20

## 2022-06-20 RX ORDER — LANSOPRAZOLE 30 MG/1
30 CAPSULE, DELAYED RELEASE ORAL
Qty: 90 CAPSULE | Refills: 0 | Status: SHIPPED | OUTPATIENT
Start: 2022-06-20

## 2022-06-20 NOTE — TELEPHONE ENCOUNTER
From: Shanta Galvan  To: Gigi Cuevas MD  Sent: 6/20/2022 11:35 AM CDT  Subject: medication    hi dr Carlos Stanton can you refill lansoprazole 30mg to [facility] in [place] thank you Jackson Class

## 2022-06-22 RX ORDER — SIMVASTATIN 40 MG
TABLET ORAL
Qty: 90 TABLET | Refills: 3 | Status: SHIPPED | OUTPATIENT
Start: 2022-06-22 | End: 2022-06-24

## 2022-06-22 RX ORDER — LOSARTAN POTASSIUM 50 MG/1
TABLET ORAL
Qty: 90 TABLET | Refills: 3 | Status: SHIPPED | OUTPATIENT
Start: 2022-06-22 | End: 2022-06-24

## 2022-06-24 RX ORDER — LOSARTAN POTASSIUM 50 MG/1
TABLET ORAL
Qty: 90 TABLET | Refills: 3 | Status: SHIPPED | OUTPATIENT
Start: 2022-06-24

## 2022-06-24 RX ORDER — SIMVASTATIN 40 MG
TABLET ORAL
Qty: 90 TABLET | Refills: 3 | Status: SHIPPED | OUTPATIENT
Start: 2022-06-24

## 2022-09-02 ENCOUNTER — TELEPHONE (OUTPATIENT)
Dept: INTERNAL MEDICINE CLINIC | Facility: CLINIC | Age: 77
End: 2022-09-02

## 2022-09-02 NOTE — TELEPHONE ENCOUNTER
Spoke to patient for medication adherence consult. Patient overdue for refill on simvastatin per insurance report. Patient recently refilled simvastatin on 8/8/22 x 90 day supply. Patient reports that she takes her medication as prescribed. Patient states that she has been tolerating her medications well. When discussing the gaps in her refill history, patient states that she may occasionally miss a dose and due to more frequent fills in the past has some extra medication that carries over month to month. Education provided on the importance of adherence to medications to ensure the most benefit and prevent any complications. Patient verbalized understanding    Patient denies any further questions or concerns about her medication.

## 2022-09-22 RX ORDER — LANSOPRAZOLE 30 MG/1
CAPSULE, DELAYED RELEASE ORAL
Qty: 90 CAPSULE | Refills: 0 | Status: SHIPPED | OUTPATIENT
Start: 2022-09-22

## 2022-10-10 RX ORDER — MONTELUKAST SODIUM 10 MG/1
TABLET ORAL
Qty: 90 TABLET | Refills: 3 | Status: SHIPPED | OUTPATIENT
Start: 2022-10-10

## 2022-11-08 ENCOUNTER — TELEPHONE (OUTPATIENT)
Dept: INTERNAL MEDICINE CLINIC | Facility: CLINIC | Age: 77
End: 2022-11-08

## 2022-11-08 NOTE — TELEPHONE ENCOUNTER
Patient came into the office to drop off parking placard form to be completed by Gris Calderon. Please call patient when form is ready to be picked up.

## 2022-11-08 NOTE — TELEPHONE ENCOUNTER
Daughter Da Freeman will be bringing in form for a placard card for pt. Pt is requesting that once Dr Magdi Saldivar fills out form, to please make an additional copy for her records.

## 2022-12-02 ENCOUNTER — TELEPHONE (OUTPATIENT)
Dept: INTERNAL MEDICINE CLINIC | Facility: CLINIC | Age: 77
End: 2022-12-02

## 2022-12-05 NOTE — TELEPHONE ENCOUNTER
Reached patient for medication adherence consult. Per insurance report, patient is past due for refill on simvastatin. Patients states she just recently had this medication refilled. She tells me she had extra supply she was working through so did not need a refill yet. She does admit to sometimes forgetting to take this medication before bed as this is her only bedtime medication. Did provide education, discussed indication and really stressed the importance of taking simvastatin consistently everyday to get the most benefit. Recommended patient move this one to earlier in the day and take with supper or with morning medications. Patient willing to take before dinner and thinks that will be a better time for her. Strongly encouraged patient to contact pharmacy if she doesn't receive her medications in the next few days. Patient confirmed understanding and denies any other questions or concerns with medications at this time.

## 2022-12-22 RX ORDER — LANSOPRAZOLE 30 MG/1
CAPSULE, DELAYED RELEASE ORAL
Qty: 90 CAPSULE | Refills: 0 | Status: SHIPPED | OUTPATIENT
Start: 2022-12-22

## 2022-12-30 ENCOUNTER — TELEPHONE (OUTPATIENT)
Dept: GASTROENTEROLOGY | Facility: CLINIC | Age: 77
End: 2022-12-30

## 2022-12-30 ENCOUNTER — OFFICE VISIT (OUTPATIENT)
Facility: CLINIC | Age: 77
End: 2022-12-30
Payer: COMMERCIAL

## 2022-12-30 VITALS
DIASTOLIC BLOOD PRESSURE: 84 MMHG | HEART RATE: 76 BPM | SYSTOLIC BLOOD PRESSURE: 146 MMHG | BODY MASS INDEX: 29 KG/M2 | WEIGHT: 174.19 LBS

## 2022-12-30 DIAGNOSIS — R10.33 PERIUMBILICAL ABDOMINAL PAIN: Primary | ICD-10-CM

## 2022-12-30 DIAGNOSIS — R63.4 WEIGHT LOSS: ICD-10-CM

## 2022-12-30 DIAGNOSIS — R63.0 POOR APPETITE: ICD-10-CM

## 2022-12-30 DIAGNOSIS — K21.9 GASTROESOPHAGEAL REFLUX DISEASE, UNSPECIFIED WHETHER ESOPHAGITIS PRESENT: ICD-10-CM

## 2022-12-30 DIAGNOSIS — R13.10 DYSPHAGIA, UNSPECIFIED TYPE: Primary | ICD-10-CM

## 2022-12-30 DIAGNOSIS — K21.9 GASTROESOPHAGEAL REFLUX DISEASE WITHOUT ESOPHAGITIS: ICD-10-CM

## 2022-12-30 NOTE — TELEPHONE ENCOUNTER
Scheduled for:  EGD 71415  Provider Name:  Dr Milka Marquez  Date:  2/10/2023  Location:  [facility]    Sedation:  MAC  Time:  10:30am (pt is aware that [facility] will call the day before to confirm arrival time)   Prep:  NPO after midnight   Meds/Allergies Reconciled?: Physician reviewed  Physician reviewed   Diagnosis with codes:  Dysphagia R13.10 GERD K21.9  Was patient informed to call insurance with codes (Y/N):  Yes, I confirmed [facility] insurance with the patient. Referral sent?:  N/A  EM or [facility] notified?:  I sent an electronic request to Endo Scheduling and received a confirmation today. Medication Orders:   This patient verbally confirmed that she is not taking:   Iron, blood thinners and is not diabetic   Not latex allergy, Not PCN allergy and does not have a pacemaker  Patient is aware to HOLD Losartan the day before the procedure   Pt is aware to NOT take iron pills, herbal meds and diet supplements for 7 days before exam. Also to NOT take any form of alcohol, recreational drugs and any forms of ED meds 24 hours before exam.       Misc Orders:  I discussed the prep instructions with the patient which she verbal understood     Further instructions given by staff:

## 2022-12-30 NOTE — PATIENT INSTRUCTIONS
1. Schedule upper endoscopy (EGD) with MAC [Diagnosis: dyspepsia, GERD]    2. Continue lansoprazole    3. You MAY need to go for COVID testing 72 hours before procedure. The testing team will call you a few days before your procedure to discuss with you if testing is required. If you are asked to go for COVID testing and do not completed the test, the procedure cannot be performed. 4.. If you start any NEW medication after your visit today, please notify us. Certain medications will need to be held before the procedure, or the procedure cannot be performed. 5. Avoid caffeine, chocolate, peppermint, and alcohol. Also avoid lying down flat or in a recumbent position for 3 hours after a meal.     6. Low FODMAP diet    7.  CT scan

## 2023-01-15 ENCOUNTER — HOSPITAL ENCOUNTER (OUTPATIENT)
Dept: CT IMAGING | Age: 78
Discharge: HOME | End: 2023-01-15
Attending: INTERNAL MEDICINE
Payer: MEDICARE

## 2023-01-15 ENCOUNTER — HOSPITAL ENCOUNTER (OUTPATIENT)
Dept: CT IMAGING | Age: 78
End: 2023-01-15
Attending: INTERNAL MEDICINE
Payer: MEDICARE

## 2023-01-15 DIAGNOSIS — R10.33 PERIUMBILICAL ABDOMINAL PAIN: ICD-10-CM

## 2023-01-15 DIAGNOSIS — R63.0 POOR APPETITE: ICD-10-CM

## 2023-01-15 PROCEDURE — 74176 CT ABD & PELVIS W/O CONTRAST: CPT | Performed by: INTERNAL MEDICINE

## 2023-01-16 ENCOUNTER — TELEPHONE (OUTPATIENT)
Dept: HEMATOLOGY/ONCOLOGY | Facility: HOSPITAL | Age: 78
End: 2023-01-16

## 2023-01-16 ENCOUNTER — TELEPHONE (OUTPATIENT)
Dept: SURGERY | Age: 78
End: 2023-01-16

## 2023-01-16 DIAGNOSIS — K21.9 GASTROESOPHAGEAL REFLUX DISEASE, UNSPECIFIED WHETHER ESOPHAGITIS PRESENT: ICD-10-CM

## 2023-01-16 DIAGNOSIS — R13.10 DYSPHAGIA, UNSPECIFIED TYPE: Primary | ICD-10-CM

## 2023-01-16 NOTE — TELEPHONE ENCOUNTER
GI Staff:   Patient with new diagnosis of pancreatic cancer, please remove her scheduled EGD.  She knows we are cancelling procedure, so she can see oncologist and start treatment etc.

## 2023-01-16 NOTE — TELEPHONE ENCOUNTER
Spoke with patient to schedule New Consult appointment tomorrow at 3:30pm with Dr. Alli Alarcon. Instructed to park in [place] lot and enter through sendwithus. Patient stated understanding and appreciative of call.

## 2023-01-17 ENCOUNTER — OFFICE VISIT (OUTPATIENT)
Dept: HEMATOLOGY/ONCOLOGY | Facility: HOSPITAL | Age: 78
End: 2023-01-17
Attending: STUDENT IN AN ORGANIZED HEALTH CARE EDUCATION/TRAINING PROGRAM
Payer: MEDICARE

## 2023-01-17 VITALS
DIASTOLIC BLOOD PRESSURE: 82 MMHG | TEMPERATURE: 99 F | SYSTOLIC BLOOD PRESSURE: 145 MMHG | HEIGHT: 65 IN | RESPIRATION RATE: 18 BRPM | OXYGEN SATURATION: 98 % | BODY MASS INDEX: 28.49 KG/M2 | HEART RATE: 92 BPM | WEIGHT: 171 LBS

## 2023-01-17 DIAGNOSIS — C78.7 LIVER METASTASES (HCC): ICD-10-CM

## 2023-01-17 DIAGNOSIS — K86.89 PANCREATIC MASS: Primary | ICD-10-CM

## 2023-01-17 LAB
ALBUMIN SERPL-MCNC: 4.4 G/DL (ref 3.4–5)
ALBUMIN/GLOB SERPL: 1.2 {RATIO} (ref 1–2)
ALP LIVER SERPL-CCNC: 159 U/L
ALT SERPL-CCNC: 26 U/L
ANION GAP SERPL CALC-SCNC: 6 MMOL/L (ref 0–18)
AST SERPL-CCNC: 30 U/L (ref 15–37)
BASOPHILS # BLD AUTO: 0.02 X10(3) UL (ref 0–0.2)
BASOPHILS NFR BLD AUTO: 0.4 %
BILIRUB SERPL-MCNC: 0.6 MG/DL (ref 0.1–2)
BUN BLD-MCNC: 19 MG/DL (ref 7–18)
BUN/CREAT SERPL: 15.4 (ref 10–20)
CALCIUM BLD-MCNC: 9.6 MG/DL (ref 8.5–10.1)
CANCER AG19-9 SERPL-ACNC: 24.1 U/ML (ref ?–37)
CEA SERPL-MCNC: 3.5 NG/ML (ref ?–5)
CHLORIDE SERPL-SCNC: 107 MMOL/L (ref 98–112)
CO2 SERPL-SCNC: 25 MMOL/L (ref 21–32)
CREAT BLD-MCNC: 1.23 MG/DL
DEPRECATED RDW RBC AUTO: 42 FL (ref 35.1–46.3)
EOSINOPHIL # BLD AUTO: 0.02 X10(3) UL (ref 0–0.7)
EOSINOPHIL NFR BLD AUTO: 0.4 %
ERYTHROCYTE [DISTWIDTH] IN BLOOD BY AUTOMATED COUNT: 12.6 % (ref 11–15)
FASTING STATUS PATIENT QL REPORTED: NO
GFR SERPLBLD BASED ON 1.73 SQ M-ARVRAT: 45 ML/MIN/1.73M2 (ref 60–?)
GLOBULIN PLAS-MCNC: 3.7 G/DL (ref 2.8–4.4)
GLUCOSE BLD-MCNC: 89 MG/DL (ref 70–99)
HCT VFR BLD AUTO: 43.1 %
HGB BLD-MCNC: 14.4 G/DL
IMM GRANULOCYTES # BLD AUTO: 0.01 X10(3) UL (ref 0–1)
IMM GRANULOCYTES NFR BLD: 0.2 %
INR BLD: 1.19 (ref 0.85–1.16)
LYMPHOCYTES # BLD AUTO: 0.96 X10(3) UL (ref 1–4)
LYMPHOCYTES NFR BLD AUTO: 16.9 %
MCH RBC QN AUTO: 30.7 PG (ref 26–34)
MCHC RBC AUTO-ENTMCNC: 33.4 G/DL (ref 31–37)
MCV RBC AUTO: 91.9 FL
MONOCYTES # BLD AUTO: 0.49 X10(3) UL (ref 0.1–1)
MONOCYTES NFR BLD AUTO: 8.6 %
NEUTROPHILS # BLD AUTO: 4.19 X10 (3) UL (ref 1.5–7.7)
NEUTROPHILS # BLD AUTO: 4.19 X10(3) UL (ref 1.5–7.7)
NEUTROPHILS NFR BLD AUTO: 73.5 %
OSMOLALITY SERPL CALC.SUM OF ELEC: 288 MOSM/KG (ref 275–295)
PLATELET # BLD AUTO: 166 10(3)UL (ref 150–450)
POTASSIUM SERPL-SCNC: 4 MMOL/L (ref 3.5–5.1)
PROT SERPL-MCNC: 8.1 G/DL (ref 6.4–8.2)
PROTHROMBIN TIME: 14.9 SECONDS (ref 11.6–14.8)
RBC # BLD AUTO: 4.69 X10(6)UL
SODIUM SERPL-SCNC: 138 MMOL/L (ref 136–145)
WBC # BLD AUTO: 5.7 X10(3) UL (ref 4–11)

## 2023-01-17 PROCEDURE — 99205 OFFICE O/P NEW HI 60 MIN: CPT | Performed by: STUDENT IN AN ORGANIZED HEALTH CARE EDUCATION/TRAINING PROGRAM

## 2023-01-19 ENCOUNTER — TELEPHONE (OUTPATIENT)
Dept: HEMATOLOGY/ONCOLOGY | Facility: HOSPITAL | Age: 78
End: 2023-01-19

## 2023-01-19 NOTE — TELEPHONE ENCOUNTER
Writer called patient to assist in scheduling of follow up appointment with Dr. Peter Gruber for review of biopsy results.  Appointment scheduled for 1/30 @ 3pm.

## 2023-01-21 ENCOUNTER — HOSPITAL ENCOUNTER (OUTPATIENT)
Dept: CT IMAGING | Facility: HOSPITAL | Age: 78
Discharge: HOME | End: 2023-01-21
Attending: STUDENT IN AN ORGANIZED HEALTH CARE EDUCATION/TRAINING PROGRAM
Payer: MEDICARE

## 2023-01-21 DIAGNOSIS — C78.7 LIVER METASTASES (HCC): ICD-10-CM

## 2023-01-21 DIAGNOSIS — K86.89 PANCREATIC MASS: ICD-10-CM

## 2023-01-21 PROCEDURE — 74178 CT ABD&PLV WO CNTR FLWD CNTR: CPT | Performed by: STUDENT IN AN ORGANIZED HEALTH CARE EDUCATION/TRAINING PROGRAM

## 2023-01-21 PROCEDURE — 71270 CT THORAX DX C-/C+: CPT | Performed by: STUDENT IN AN ORGANIZED HEALTH CARE EDUCATION/TRAINING PROGRAM

## 2023-01-23 ENCOUNTER — LAB ENCOUNTER (OUTPATIENT)
Dept: LAB | Age: 78
End: 2023-01-23
Attending: RADIOLOGY
Payer: MEDICARE

## 2023-01-23 DIAGNOSIS — Z01.818 PRE-OP TESTING: ICD-10-CM

## 2023-01-24 LAB — SARS-COV-2 RNA RESP QL NAA+PROBE: NOT DETECTED

## 2023-01-25 ENCOUNTER — HOSPITAL ENCOUNTER (OUTPATIENT)
Dept: INTERVENTIONAL RADIOLOGY/VASCULAR | Facility: HOSPITAL | Age: 78
Discharge: HOME | End: 2023-01-25
Attending: STUDENT IN AN ORGANIZED HEALTH CARE EDUCATION/TRAINING PROGRAM | Admitting: RADIOLOGY
Payer: MEDICARE

## 2023-01-25 VITALS
SYSTOLIC BLOOD PRESSURE: 144 MMHG | OXYGEN SATURATION: 93 % | TEMPERATURE: 97 F | BODY MASS INDEX: 28.49 KG/M2 | RESPIRATION RATE: 16 BRPM | HEIGHT: 65 IN | HEART RATE: 65 BPM | WEIGHT: 171 LBS | DIASTOLIC BLOOD PRESSURE: 80 MMHG

## 2023-01-25 DIAGNOSIS — Z01.818 PRE-OP TESTING: Primary | ICD-10-CM

## 2023-01-25 DIAGNOSIS — K86.89 PANCREATIC MASS: ICD-10-CM

## 2023-01-25 DIAGNOSIS — C78.7 LIVER METASTASES (HCC): ICD-10-CM

## 2023-01-25 PROCEDURE — 99152 MOD SED SAME PHYS/QHP 5/>YRS: CPT

## 2023-01-25 PROCEDURE — 88360 TUMOR IMMUNOHISTOCHEM/MANUAL: CPT | Performed by: STUDENT IN AN ORGANIZED HEALTH CARE EDUCATION/TRAINING PROGRAM

## 2023-01-25 PROCEDURE — 0FB03ZX EXCISION OF LIVER, PERCUTANEOUS APPROACH, DIAGNOSTIC: ICD-10-PCS | Performed by: RADIOLOGY

## 2023-01-25 PROCEDURE — 88334 PATH CONSLTJ SURG CYTO XM EA: CPT | Performed by: STUDENT IN AN ORGANIZED HEALTH CARE EDUCATION/TRAINING PROGRAM

## 2023-01-25 PROCEDURE — 88341 IMHCHEM/IMCYTCHM EA ADD ANTB: CPT | Performed by: STUDENT IN AN ORGANIZED HEALTH CARE EDUCATION/TRAINING PROGRAM

## 2023-01-25 PROCEDURE — 88333 PATH CONSLTJ SURG CYTO XM 1: CPT | Performed by: STUDENT IN AN ORGANIZED HEALTH CARE EDUCATION/TRAINING PROGRAM

## 2023-01-25 PROCEDURE — 47000 NEEDLE BIOPSY OF LIVER PERQ: CPT

## 2023-01-25 PROCEDURE — 76942 ECHO GUIDE FOR BIOPSY: CPT

## 2023-01-25 PROCEDURE — 88307 TISSUE EXAM BY PATHOLOGIST: CPT | Performed by: STUDENT IN AN ORGANIZED HEALTH CARE EDUCATION/TRAINING PROGRAM

## 2023-01-25 PROCEDURE — 88342 IMHCHEM/IMCYTCHM 1ST ANTB: CPT | Performed by: STUDENT IN AN ORGANIZED HEALTH CARE EDUCATION/TRAINING PROGRAM

## 2023-01-25 RX ORDER — SODIUM CHLORIDE 9 MG/ML
INJECTION, SOLUTION INTRAVENOUS CONTINUOUS
Status: DISCONTINUED | OUTPATIENT
Start: 2023-01-25 | End: 2023-01-25

## 2023-01-25 RX ORDER — MIDAZOLAM HYDROCHLORIDE 1 MG/ML
INJECTION INTRAMUSCULAR; INTRAVENOUS
Status: COMPLETED
Start: 2023-01-25 | End: 2023-01-25

## 2023-01-25 RX ADMIN — SODIUM CHLORIDE: 9 INJECTION, SOLUTION INTRAVENOUS at 08:00:00

## 2023-01-25 NOTE — PROGRESS NOTES
Pt in IR for liver biopsy in cath lab holding room 9. Time out and universal protocol completed. Pt [id number] of versed and 50 of fentanyl. Vitals stable pt tolerated procedure well. Report given to [place] [facility].

## 2023-01-25 NOTE — INTERVAL H&P NOTE
The above referenced H&P was reviewed by Chhaya Moss. Ayesha Blanchard MD on 1/25/2023, the patient was examined and no significant changes have occurred in the patient's condition since the H&P was performed. Risks, benefits, alternative treatments and consequences of no treatment were discussed. We will proceed with the liver lesion biopsy as planned. Mohinder Blanchard MD  1/25/2023  8:33 AM

## 2023-01-25 NOTE — DISCHARGE INSTRUCTIONS
Pr-14  4.2  [phone]     Patient Name:  Shanta Galvan    Procedure:  Liver Lesion Biopsy    Site Care: Remove your band-aid or dressing in 24 hours. Gently wash area with soap and water. Activity/Diet  No heavy lifting or strenuous activity for 48 hours. Drink plenty of fluids, unless you have otherwise been told to restrict your fluid intake. Do not drink alcohol for 24 hours. Do not drive,  operate heavy machinery, make important decisions or sign legal documents today. Medications: Take acetaminophen if needed for pain. Do not exceed 4000mg of acetaminophen in a 24-hour period. , Do not take blood thinners, aspirin, or Plavix for 24 hours. , and Make no changes to your existing medications. Contact Interventional Radiology at [phone] if you have severe/unrelieved pain, fever, chills, dizziness/lightheadedness, or drainage/bleeding from your incision site.

## 2023-01-25 NOTE — IVS NOTE
DISCHARGE NOTE     Pt is able to sit up and ambulate without difficulty. Pt voided and tolerated fluids. Right upper abdomen liver biopsy procedural site remains dry and intact. No signs and symptoms of bleeding/hematoma noted. Pt denies any pain or discomfort at this time. IV access removed  Instruction provided, patient/family verbalizes understanding. Dr. Eric Apple spoke with patient/family post procedure.      Pt discharge via wheelchair to [facility]     Follow up Appointment: n/a    New Prescription: n/a

## 2023-01-30 ENCOUNTER — OFFICE VISIT (OUTPATIENT)
Dept: HEMATOLOGY/ONCOLOGY | Facility: HOSPITAL | Age: 78
End: 2023-01-30
Attending: STUDENT IN AN ORGANIZED HEALTH CARE EDUCATION/TRAINING PROGRAM
Payer: MEDICARE

## 2023-01-30 VITALS
WEIGHT: 173 LBS | RESPIRATION RATE: 18 BRPM | SYSTOLIC BLOOD PRESSURE: 148 MMHG | HEART RATE: 96 BPM | BODY MASS INDEX: 28.82 KG/M2 | DIASTOLIC BLOOD PRESSURE: 81 MMHG | OXYGEN SATURATION: 98 % | TEMPERATURE: 98 F | HEIGHT: 65 IN

## 2023-01-30 DIAGNOSIS — Z51.11 CHEMOTHERAPY MANAGEMENT, ENCOUNTER FOR: ICD-10-CM

## 2023-01-30 DIAGNOSIS — C25.9 ADENOCARCINOMA OF PANCREAS (HCC): Primary | ICD-10-CM

## 2023-01-30 PROCEDURE — 99211 OFF/OP EST MAY X REQ PHY/QHP: CPT

## 2023-01-30 PROCEDURE — 36415 COLL VENOUS BLD VENIPUNCTURE: CPT

## 2023-01-30 RX ORDER — TRAMADOL HYDROCHLORIDE 50 MG/1
TABLET ORAL EVERY 6 HOURS PRN
Qty: 90 TABLET | Refills: 0 | Status: SHIPPED | OUTPATIENT
Start: 2023-01-30

## 2023-01-30 RX ORDER — LORAZEPAM 0.5 MG/1
0.5 TABLET ORAL EVERY 12 HOURS PRN
Qty: 60 TABLET | Refills: 0 | Status: SHIPPED | OUTPATIENT
Start: 2023-01-30

## 2023-01-31 ENCOUNTER — DOCUMENTATION ONLY (OUTPATIENT)
Dept: HEMATOLOGY/ONCOLOGY | Facility: HOSPITAL | Age: 78
End: 2023-01-31

## 2023-02-03 ENCOUNTER — OFFICE VISIT (OUTPATIENT)
Dept: HEMATOLOGY/ONCOLOGY | Facility: HOSPITAL | Age: 78
End: 2023-02-03
Attending: STUDENT IN AN ORGANIZED HEALTH CARE EDUCATION/TRAINING PROGRAM
Payer: MEDICARE

## 2023-02-03 ENCOUNTER — LAB ENCOUNTER (OUTPATIENT)
Dept: LAB | Facility: HOSPITAL | Age: 78
End: 2023-02-03
Attending: RADIOLOGY
Payer: MEDICARE

## 2023-02-03 DIAGNOSIS — C25.9 ADENOCARCINOMA OF PANCREAS (HCC): Primary | ICD-10-CM

## 2023-02-03 DIAGNOSIS — C25.9 ADENOCARCINOMA OF PANCREAS (HCC): ICD-10-CM

## 2023-02-03 DIAGNOSIS — R11.0 CHEMOTHERAPY-INDUCED NAUSEA: ICD-10-CM

## 2023-02-03 DIAGNOSIS — Z01.818 PRE-OP TESTING: ICD-10-CM

## 2023-02-03 DIAGNOSIS — T45.1X5A CHEMOTHERAPY-INDUCED NAUSEA: ICD-10-CM

## 2023-02-03 DIAGNOSIS — Z71.9 HEALTH EDUCATION: ICD-10-CM

## 2023-02-03 LAB
ALBUMIN SERPL-MCNC: 4 G/DL (ref 3.4–5)
ALBUMIN/GLOB SERPL: 1.2 {RATIO} (ref 1–2)
ALP LIVER SERPL-CCNC: 150 U/L
ALT SERPL-CCNC: 27 U/L
ANION GAP SERPL CALC-SCNC: 8 MMOL/L (ref 0–18)
AST SERPL-CCNC: 25 U/L (ref 15–37)
BASOPHILS # BLD AUTO: 0.02 X10(3) UL (ref 0–0.2)
BASOPHILS NFR BLD AUTO: 0.4 %
BILIRUB SERPL-MCNC: 0.9 MG/DL (ref 0.1–2)
BUN BLD-MCNC: 25 MG/DL (ref 7–18)
BUN/CREAT SERPL: 24.8 (ref 10–20)
CALCIUM BLD-MCNC: 9.7 MG/DL (ref 8.5–10.1)
CANCER AG19-9 SERPL-ACNC: 39.7 U/ML (ref ?–37)
CEA SERPL-MCNC: 4.1 NG/ML (ref ?–5)
CHLORIDE SERPL-SCNC: 106 MMOL/L (ref 98–112)
CO2 SERPL-SCNC: 27 MMOL/L (ref 21–32)
CREAT BLD-MCNC: 1.01 MG/DL
DEPRECATED RDW RBC AUTO: 43.2 FL (ref 35.1–46.3)
EOSINOPHIL # BLD AUTO: 0.04 X10(3) UL (ref 0–0.7)
EOSINOPHIL NFR BLD AUTO: 0.8 %
ERYTHROCYTE [DISTWIDTH] IN BLOOD BY AUTOMATED COUNT: 12.9 % (ref 11–15)
FASTING STATUS PATIENT QL REPORTED: NO
GFR SERPLBLD BASED ON 1.73 SQ M-ARVRAT: 57 ML/MIN/1.73M2 (ref 60–?)
GLOBULIN PLAS-MCNC: 3.4 G/DL (ref 2.8–4.4)
GLUCOSE BLD-MCNC: 98 MG/DL (ref 70–99)
HCT VFR BLD AUTO: 40.6 %
HGB BLD-MCNC: 13.5 G/DL
IMM GRANULOCYTES # BLD AUTO: 0.01 X10(3) UL (ref 0–1)
IMM GRANULOCYTES NFR BLD: 0.2 %
LYMPHOCYTES # BLD AUTO: 0.94 X10(3) UL (ref 1–4)
LYMPHOCYTES NFR BLD AUTO: 18.9 %
MCH RBC QN AUTO: 30.3 PG (ref 26–34)
MCHC RBC AUTO-ENTMCNC: 33.3 G/DL (ref 31–37)
MCV RBC AUTO: 91.2 FL
MONOCYTES # BLD AUTO: 0.51 X10(3) UL (ref 0.1–1)
MONOCYTES NFR BLD AUTO: 10.2 %
NEUTROPHILS # BLD AUTO: 3.46 X10 (3) UL (ref 1.5–7.7)
NEUTROPHILS # BLD AUTO: 3.46 X10(3) UL (ref 1.5–7.7)
NEUTROPHILS NFR BLD AUTO: 69.5 %
OSMOLALITY SERPL CALC.SUM OF ELEC: 296 MOSM/KG (ref 275–295)
PLATELET # BLD AUTO: 138 10(3)UL (ref 150–450)
POTASSIUM SERPL-SCNC: 4.1 MMOL/L (ref 3.5–5.1)
PROT SERPL-MCNC: 7.4 G/DL (ref 6.4–8.2)
RBC # BLD AUTO: 4.45 X10(6)UL
SARS-COV-2 RNA RESP QL NAA+PROBE: NOT DETECTED
SODIUM SERPL-SCNC: 141 MMOL/L (ref 136–145)
WBC # BLD AUTO: 5 X10(3) UL (ref 4–11)

## 2023-02-03 PROCEDURE — 99215 OFFICE O/P EST HI 40 MIN: CPT | Performed by: PHYSICIAN ASSISTANT

## 2023-02-03 PROCEDURE — 80053 COMPREHEN METABOLIC PANEL: CPT

## 2023-02-03 PROCEDURE — 85025 COMPLETE CBC W/AUTO DIFF WBC: CPT

## 2023-02-03 PROCEDURE — 36415 COLL VENOUS BLD VENIPUNCTURE: CPT

## 2023-02-03 PROCEDURE — 86301 IMMUNOASSAY TUMOR CA 19-9: CPT

## 2023-02-03 PROCEDURE — 82378 CARCINOEMBRYONIC ANTIGEN: CPT

## 2023-02-03 RX ORDER — ONDANSETRON HYDROCHLORIDE 8 MG/1
8 TABLET, FILM COATED ORAL EVERY 8 HOURS PRN
Qty: 30 TABLET | Refills: 3 | Status: SHIPPED | OUTPATIENT
Start: 2023-02-03

## 2023-02-03 RX ORDER — PROCHLORPERAZINE MALEATE 10 MG
10 TABLET ORAL EVERY 8 HOURS PRN
Qty: 30 TABLET | Refills: 3 | Status: SHIPPED | OUTPATIENT
Start: 2023-02-03

## 2023-02-06 ENCOUNTER — HOSPITAL ENCOUNTER (OUTPATIENT)
Dept: INTERVENTIONAL RADIOLOGY/VASCULAR | Facility: HOSPITAL | Age: 78
Discharge: HOME | End: 2023-02-06
Attending: STUDENT IN AN ORGANIZED HEALTH CARE EDUCATION/TRAINING PROGRAM | Admitting: RADIOLOGY
Payer: MEDICARE

## 2023-02-06 VITALS
SYSTOLIC BLOOD PRESSURE: 143 MMHG | RESPIRATION RATE: 19 BRPM | TEMPERATURE: 98 F | WEIGHT: 171 LBS | DIASTOLIC BLOOD PRESSURE: 63 MMHG | HEIGHT: 65 IN | OXYGEN SATURATION: 96 % | BODY MASS INDEX: 28.49 KG/M2 | HEART RATE: 71 BPM

## 2023-02-06 DIAGNOSIS — Z01.818 PRE-OP TESTING: Primary | ICD-10-CM

## 2023-02-06 DIAGNOSIS — C25.9 ADENOCARCINOMA OF PANCREAS (HCC): ICD-10-CM

## 2023-02-06 PROCEDURE — 76937 US GUIDE VASCULAR ACCESS: CPT

## 2023-02-06 PROCEDURE — 77001 FLUOROGUIDE FOR VEIN DEVICE: CPT

## 2023-02-06 PROCEDURE — 99152 MOD SED SAME PHYS/QHP 5/>YRS: CPT

## 2023-02-06 PROCEDURE — 99153 MOD SED SAME PHYS/QHP EA: CPT

## 2023-02-06 PROCEDURE — 36415 COLL VENOUS BLD VENIPUNCTURE: CPT

## 2023-02-06 PROCEDURE — 36561 INSERT TUNNELED CV CATH: CPT

## 2023-02-06 PROCEDURE — 0JH60WZ INSERTION OF TOTALLY IMPLANTABLE VASCULAR ACCESS DEVICE INTO CHEST SUBCUTANEOUS TISSUE AND FASCIA, OPEN APPROACH: ICD-10-PCS | Performed by: RADIOLOGY

## 2023-02-06 PROCEDURE — 02HV33Z INSERTION OF INFUSION DEVICE INTO SUPERIOR VENA CAVA, PERCUTANEOUS APPROACH: ICD-10-PCS | Performed by: RADIOLOGY

## 2023-02-06 RX ORDER — LIDOCAINE HYDROCHLORIDE 20 MG/ML
INJECTION, SOLUTION EPIDURAL; INFILTRATION; INTRACAUDAL; PERINEURAL
Status: COMPLETED
Start: 2023-02-06 | End: 2023-02-06

## 2023-02-06 RX ORDER — CEFAZOLIN SODIUM/WATER 2 G/20 ML
SYRINGE (ML) INTRAVENOUS
Status: COMPLETED
Start: 2023-02-06 | End: 2023-02-06

## 2023-02-06 RX ORDER — MIDAZOLAM HYDROCHLORIDE 1 MG/ML
INJECTION INTRAMUSCULAR; INTRAVENOUS
Status: COMPLETED
Start: 2023-02-06 | End: 2023-02-06

## 2023-02-06 RX ORDER — SODIUM CHLORIDE 9 MG/ML
INJECTION, SOLUTION INTRAVENOUS CONTINUOUS
Status: DISCONTINUED | OUTPATIENT
Start: 2023-02-06 | End: 2023-02-06

## 2023-02-06 RX ORDER — HEPARIN SODIUM (PORCINE) LOCK FLUSH IV SOLN 100 UNIT/ML 100 UNIT/ML
SOLUTION INTRAVENOUS
Status: COMPLETED
Start: 2023-02-06 | End: 2023-02-06

## 2023-02-06 NOTE — INTERVAL H&P NOTE
The above referenced H&P was reviewed by Gloria Hardin MD on 2/6/2023, the patient was examined and no significant changes have occurred in the patient's condition since the H&P was performed. Risks, benefits, alternative treatments and consequences of no treatment were discussed. We will proceed with procedure as planned.       Gloria Hardin MD  2/6/2023  7:07 AM

## 2023-02-06 NOTE — IVS NOTE
DISCHARGE NOTE     Pt is able to sit up and ambulate without difficulty. Pt voided and tolerated fluids. Right chest port procedural site remains dry and intact with good circulation, motion, and sensation. No signs and symptoms of bleeding/hematoma noted. Pt denies any pain or discomfort at this time. IV access removed  Instruction provided, patient/family verbalizes understanding.      Pt discharge via wheelchair to [facility]

## 2023-02-09 ENCOUNTER — NURSE ONLY (OUTPATIENT)
Dept: HEMATOLOGY/ONCOLOGY | Facility: HOSPITAL | Age: 78
End: 2023-02-09
Attending: STUDENT IN AN ORGANIZED HEALTH CARE EDUCATION/TRAINING PROGRAM
Payer: MEDICARE

## 2023-02-09 ENCOUNTER — SOCIAL WORK SERVICES (OUTPATIENT)
Dept: HEMATOLOGY/ONCOLOGY | Facility: HOSPITAL | Age: 78
End: 2023-02-09

## 2023-02-09 VITALS
HEIGHT: 65 IN | HEART RATE: 87 BPM | SYSTOLIC BLOOD PRESSURE: 129 MMHG | RESPIRATION RATE: 18 BRPM | BODY MASS INDEX: 28.41 KG/M2 | TEMPERATURE: 98 F | DIASTOLIC BLOOD PRESSURE: 82 MMHG | OXYGEN SATURATION: 98 % | WEIGHT: 170.5 LBS

## 2023-02-09 DIAGNOSIS — Z51.11 CHEMOTHERAPY MANAGEMENT, ENCOUNTER FOR: ICD-10-CM

## 2023-02-09 DIAGNOSIS — C25.9 ADENOCARCINOMA OF PANCREAS (HCC): Primary | ICD-10-CM

## 2023-02-09 LAB
ALBUMIN SERPL-MCNC: 3.5 G/DL (ref 3.4–5)
ALBUMIN/GLOB SERPL: 1 {RATIO} (ref 1–2)
ALP LIVER SERPL-CCNC: 165 U/L
ALT SERPL-CCNC: 28 U/L
ANION GAP SERPL CALC-SCNC: 7 MMOL/L (ref 0–18)
AST SERPL-CCNC: 27 U/L (ref 15–37)
BASOPHILS # BLD AUTO: 0.01 X10(3) UL (ref 0–0.2)
BASOPHILS NFR BLD AUTO: 0.2 %
BILIRUB SERPL-MCNC: 0.7 MG/DL (ref 0.1–2)
BUN BLD-MCNC: 22 MG/DL (ref 7–18)
BUN/CREAT SERPL: 22.9 (ref 10–20)
CALCIUM BLD-MCNC: 9.1 MG/DL (ref 8.5–10.1)
CANCER AG19-9 SERPL-ACNC: 39.1 U/ML (ref ?–37)
CHLORIDE SERPL-SCNC: 109 MMOL/L (ref 98–112)
CO2 SERPL-SCNC: 24 MMOL/L (ref 21–32)
CREAT BLD-MCNC: 0.96 MG/DL
DEPRECATED RDW RBC AUTO: 45 FL (ref 35.1–46.3)
EOSINOPHIL # BLD AUTO: 0.02 X10(3) UL (ref 0–0.7)
EOSINOPHIL NFR BLD AUTO: 0.4 %
ERYTHROCYTE [DISTWIDTH] IN BLOOD BY AUTOMATED COUNT: 13.2 % (ref 11–15)
GFR SERPLBLD BASED ON 1.73 SQ M-ARVRAT: 61 ML/MIN/1.73M2 (ref 60–?)
GLOBULIN PLAS-MCNC: 3.6 G/DL (ref 2.8–4.4)
GLUCOSE BLD-MCNC: 115 MG/DL (ref 70–99)
HCT VFR BLD AUTO: 38.1 %
HGB BLD-MCNC: 12.5 G/DL
IMM GRANULOCYTES # BLD AUTO: 0.02 X10(3) UL (ref 0–1)
IMM GRANULOCYTES NFR BLD: 0.4 %
LYMPHOCYTES # BLD AUTO: 0.85 X10(3) UL (ref 1–4)
LYMPHOCYTES NFR BLD AUTO: 18.8 %
MCH RBC QN AUTO: 30.1 PG (ref 26–34)
MCHC RBC AUTO-ENTMCNC: 32.8 G/DL (ref 31–37)
MCV RBC AUTO: 91.8 FL
MONOCYTES # BLD AUTO: 0.54 X10(3) UL (ref 0.1–1)
MONOCYTES NFR BLD AUTO: 11.9 %
NEUTROPHILS # BLD AUTO: 3.09 X10 (3) UL (ref 1.5–7.7)
NEUTROPHILS # BLD AUTO: 3.09 X10(3) UL (ref 1.5–7.7)
NEUTROPHILS NFR BLD AUTO: 68.3 %
OSMOLALITY SERPL CALC.SUM OF ELEC: 294 MOSM/KG (ref 275–295)
PLATELET # BLD AUTO: 136 10(3)UL (ref 150–450)
POTASSIUM SERPL-SCNC: 3.6 MMOL/L (ref 3.5–5.1)
PROT SERPL-MCNC: 7.1 G/DL (ref 6.4–8.2)
RBC # BLD AUTO: 4.15 X10(6)UL
SODIUM SERPL-SCNC: 140 MMOL/L (ref 136–145)
WBC # BLD AUTO: 4.5 X10(3) UL (ref 4–11)

## 2023-02-09 PROCEDURE — 86301 IMMUNOASSAY TUMOR CA 19-9: CPT

## 2023-02-09 PROCEDURE — 80053 COMPREHEN METABOLIC PANEL: CPT

## 2023-02-09 PROCEDURE — 96413 CHEMO IV INFUSION 1 HR: CPT

## 2023-02-09 PROCEDURE — 85025 COMPLETE CBC W/AUTO DIFF WBC: CPT

## 2023-02-09 PROCEDURE — 96375 TX/PRO/DX INJ NEW DRUG ADDON: CPT

## 2023-02-09 PROCEDURE — 96417 CHEMO IV INFUS EACH ADDL SEQ: CPT

## 2023-02-09 RX ORDER — LIDOCAINE AND PRILOCAINE 25; 25 MG/G; MG/G
CREAM TOPICAL
Qty: 30 G | Refills: 1 | Status: SHIPPED | OUTPATIENT
Start: 2023-02-09

## 2023-02-09 NOTE — PROGRESS NOTES
Pt here for C1D1 ABRAXANE + GEMZAR. Arrives Ambulating independently, accompanied by Family member           Pregnancy screening: Denies possibility of pregnancy    Modifications in dose or schedule: No    Drugs/infusions dual verified for appearance and physical integrity.  IV pump settings were dual verified: yes     Frequency of blood return and site check throughout administration: Prior to administration, Prior to each drug and At completion of therapy   Discharged to Home, Ambulating independently, accompanied by:Family member    Outpatient Oncology Care Plan  Problem list:  knowledge deficit  Problems related to:  chemotherapy  side effect of treatment  Interventions:  educate hygiene/handwashing  emotional support given  monitor for signs/symptoms of infection  nausea/vomiting teaching  chemotherapy teaching  provided general teaching  Expected outcomes:  patient supported/coping well  safe in environment  symptoms relieved/minimized  understands plan of care  verbalize how to care for self  Progress towards outcome:  making progress    Education Record    Learner:  Patient and Family Member  Barriers / Limitations:  Emotional factors  Method:  Discussion  Outcome:  Shows understanding  Comments: Printed AVS, given to daughter and patient

## 2023-02-09 NOTE — PROGRESS NOTES
SW completed an assessment with pt to provide support and encouragement due to new chemo starting today. Pt is a 66 y/o woman who lives in [place] with her daughter, son in law and grandchildren. Pt has a strong family support system. Pt is retired from proof reading profession. Social determinants of health assessment completed with pt and no needs identified at this time. Pt presents with normal affect and mood. Patient reports feeling well and she did not feel any anxiety about starting chemo    SW reviewed cancer support resources provided by \[facility] [place] [facility]\"" Resources. SW provided a pack of resources including information on transportation assistance, homecare/home health agencies and counseling resources. SW reviewed Advance Directives and importance of completion. SW explained role of SW in [facility] and provided support as pt shared feelings and thoughts on her Cancer dx. SW contact information given. Coping skills reviewed and support services encouraged due to new cancer dx.

## 2023-02-10 ENCOUNTER — TELEPHONE (OUTPATIENT)
Dept: HEMATOLOGY/ONCOLOGY | Facility: HOSPITAL | Age: 78
End: 2023-02-10

## 2023-02-10 NOTE — TELEPHONE ENCOUNTER
Called patient post C1D1 new tx. No complaints or questions. Reinforced plan and to call for any concerns. She verbalizes understanding.

## 2023-02-11 ENCOUNTER — HOSPITAL ENCOUNTER (EMERGENCY)
Facility: HOSPITAL | Age: 78
Discharge: HOME | End: 2023-02-11
Attending: EMERGENCY MEDICINE
Payer: MEDICARE

## 2023-02-11 VITALS
HEIGHT: 65 IN | WEIGHT: 170 LBS | OXYGEN SATURATION: 96 % | HEART RATE: 98 BPM | BODY MASS INDEX: 28.32 KG/M2 | SYSTOLIC BLOOD PRESSURE: 144 MMHG | DIASTOLIC BLOOD PRESSURE: 82 MMHG | RESPIRATION RATE: 18 BRPM | TEMPERATURE: 99 F

## 2023-02-11 DIAGNOSIS — J02.0 STREP PHARYNGITIS: Primary | ICD-10-CM

## 2023-02-11 LAB
S PYO AG THROAT QL: POSITIVE
SARS-COV-2 RNA RESP QL NAA+PROBE: NOT DETECTED

## 2023-02-11 PROCEDURE — 99283 EMERGENCY DEPT VISIT LOW MDM: CPT

## 2023-02-11 PROCEDURE — 87880 STREP A ASSAY W/OPTIC: CPT

## 2023-02-11 PROCEDURE — 99284 EMERGENCY DEPT VISIT MOD MDM: CPT

## 2023-02-11 RX ORDER — BENZOCAINE AND DEXTROMETHORPHAN HYDROBROMIDE 7.5; 5 MG/1; MG/1
1 LOZENGE ORAL
Qty: 20 LOZENGE | Refills: 0 | Status: SHIPPED | OUTPATIENT
Start: 2023-02-11 | End: 2023-02-18

## 2023-02-11 RX ORDER — ONDANSETRON 4 MG/1
TABLET, ORALLY DISINTEGRATING ORAL
Status: COMPLETED
Start: 2023-02-11 | End: 2023-02-11

## 2023-02-11 RX ORDER — ONDANSETRON 4 MG/1
4 TABLET, ORALLY DISINTEGRATING ORAL ONCE
Status: COMPLETED | OUTPATIENT
Start: 2023-02-11 | End: 2023-02-11

## 2023-02-11 RX ORDER — AMOXICILLIN 500 MG/1
1000 TABLET, FILM COATED ORAL DAILY
Qty: 20 TABLET | Refills: 0 | Status: SHIPPED | OUTPATIENT
Start: 2023-02-11 | End: 2023-02-21

## 2023-02-11 RX ORDER — ONDANSETRON 4 MG/1
4 TABLET, ORALLY DISINTEGRATING ORAL EVERY 4 HOURS PRN
Qty: 10 TABLET | Refills: 0 | Status: SHIPPED | OUTPATIENT
Start: 2023-02-11 | End: 2023-02-18

## 2023-02-11 NOTE — ED INITIAL ASSESSMENT (HPI)
Pt ambulatory to ed for soret throat, since last night. Pt states she has chills and dry mouth. Denies sick contacts. Pt had 1st chemo on Thursday.

## 2023-02-16 ENCOUNTER — OFFICE VISIT (OUTPATIENT)
Dept: HEMATOLOGY/ONCOLOGY | Facility: HOSPITAL | Age: 78
End: 2023-02-16
Attending: STUDENT IN AN ORGANIZED HEALTH CARE EDUCATION/TRAINING PROGRAM
Payer: MEDICARE

## 2023-02-16 VITALS
HEART RATE: 94 BPM | OXYGEN SATURATION: 97 % | RESPIRATION RATE: 18 BRPM | BODY MASS INDEX: 28.32 KG/M2 | HEIGHT: 65 IN | WEIGHT: 170 LBS | TEMPERATURE: 99 F | SYSTOLIC BLOOD PRESSURE: 143 MMHG | DIASTOLIC BLOOD PRESSURE: 72 MMHG

## 2023-02-16 DIAGNOSIS — C78.7 LIVER METASTASES (HCC): ICD-10-CM

## 2023-02-16 DIAGNOSIS — C25.9 ADENOCARCINOMA OF PANCREAS (HCC): Primary | ICD-10-CM

## 2023-02-16 DIAGNOSIS — Z51.11 CHEMOTHERAPY MANAGEMENT, ENCOUNTER FOR: ICD-10-CM

## 2023-02-16 LAB
ALBUMIN SERPL-MCNC: 3.5 G/DL (ref 3.4–5)
ALBUMIN/GLOB SERPL: 1 {RATIO} (ref 1–2)
ALP LIVER SERPL-CCNC: 163 U/L
ALT SERPL-CCNC: 46 U/L
ANION GAP SERPL CALC-SCNC: 8 MMOL/L (ref 0–18)
AST SERPL-CCNC: 39 U/L (ref 15–37)
BASOPHILS # BLD AUTO: 0.01 X10(3) UL (ref 0–0.2)
BASOPHILS NFR BLD AUTO: 0.2 %
BILIRUB SERPL-MCNC: 0.5 MG/DL (ref 0.1–2)
BUN BLD-MCNC: 23 MG/DL (ref 7–18)
BUN/CREAT SERPL: 25.3 (ref 10–20)
CALCIUM BLD-MCNC: 9.1 MG/DL (ref 8.5–10.1)
CHLORIDE SERPL-SCNC: 109 MMOL/L (ref 98–112)
CO2 SERPL-SCNC: 24 MMOL/L (ref 21–32)
CREAT BLD-MCNC: 0.91 MG/DL
DEPRECATED RDW RBC AUTO: 42.3 FL (ref 35.1–46.3)
EOSINOPHIL # BLD AUTO: 0.1 X10(3) UL (ref 0–0.7)
EOSINOPHIL NFR BLD AUTO: 2.2 %
ERYTHROCYTE [DISTWIDTH] IN BLOOD BY AUTOMATED COUNT: 12.8 % (ref 11–15)
GFR SERPLBLD BASED ON 1.73 SQ M-ARVRAT: 65 ML/MIN/1.73M2 (ref 60–?)
GLOBULIN PLAS-MCNC: 3.6 G/DL (ref 2.8–4.4)
GLUCOSE BLD-MCNC: 88 MG/DL (ref 70–99)
HCT VFR BLD AUTO: 37.3 %
HGB BLD-MCNC: 12.4 G/DL
IMM GRANULOCYTES # BLD AUTO: 0.02 X10(3) UL (ref 0–1)
IMM GRANULOCYTES NFR BLD: 0.4 %
LYMPHOCYTES # BLD AUTO: 0.84 X10(3) UL (ref 1–4)
LYMPHOCYTES NFR BLD AUTO: 18.2 %
MCH RBC QN AUTO: 30.1 PG (ref 26–34)
MCHC RBC AUTO-ENTMCNC: 33.2 G/DL (ref 31–37)
MCV RBC AUTO: 90.5 FL
MONOCYTES # BLD AUTO: 0.43 X10(3) UL (ref 0.1–1)
MONOCYTES NFR BLD AUTO: 9.3 %
NEUTROPHILS # BLD AUTO: 3.21 X10 (3) UL (ref 1.5–7.7)
NEUTROPHILS # BLD AUTO: 3.21 X10(3) UL (ref 1.5–7.7)
NEUTROPHILS NFR BLD AUTO: 69.7 %
OSMOLALITY SERPL CALC.SUM OF ELEC: 295 MOSM/KG (ref 275–295)
PLATELET # BLD AUTO: 123 10(3)UL (ref 150–450)
POTASSIUM SERPL-SCNC: 3.5 MMOL/L (ref 3.5–5.1)
PROT SERPL-MCNC: 7.1 G/DL (ref 6.4–8.2)
RBC # BLD AUTO: 4.12 X10(6)UL
SODIUM SERPL-SCNC: 141 MMOL/L (ref 136–145)
WBC # BLD AUTO: 4.6 X10(3) UL (ref 4–11)

## 2023-02-16 PROCEDURE — 96417 CHEMO IV INFUS EACH ADDL SEQ: CPT

## 2023-02-16 PROCEDURE — 80053 COMPREHEN METABOLIC PANEL: CPT

## 2023-02-16 PROCEDURE — 96375 TX/PRO/DX INJ NEW DRUG ADDON: CPT

## 2023-02-16 PROCEDURE — 96413 CHEMO IV INFUSION 1 HR: CPT

## 2023-02-16 PROCEDURE — 85025 COMPLETE CBC W/AUTO DIFF WBC: CPT

## 2023-02-16 PROCEDURE — 99215 OFFICE O/P EST HI 40 MIN: CPT | Performed by: STUDENT IN AN ORGANIZED HEALTH CARE EDUCATION/TRAINING PROGRAM

## 2023-02-16 RX ORDER — HEPARIN SODIUM (PORCINE) LOCK FLUSH IV SOLN 100 UNIT/ML 100 UNIT/ML
SOLUTION INTRAVENOUS
Status: DISCONTINUED
Start: 2023-02-16 | End: 2023-02-16

## 2023-02-16 NOTE — PROGRESS NOTES
Pt here for C1D8 ABRAXANE + GEMZAR. Arrives Ambulating independently, accompanied by Family member           Pregnancy screening: Denies possibility of pregnancy    Modifications in dose or schedule: No    Drugs/infusions dual verified for appearance and physical integrity.  IV pump settings were dual verified: yes     Frequency of blood return and site check throughout administration: Prior to administration, Prior to each drug and At completion of therapy   Discharged to Home, Ambulating independently, accompanied by:Family member    Outpatient Oncology Care Plan  Problem list:  knowledge deficit  Problems related to:  chemotherapy  side effect of treatment  Interventions:  educate hygiene/handwashing  emotional support given  monitor for signs/symptoms of infection  nausea/vomiting teaching  chemotherapy teaching  provided general teaching  Expected outcomes:  patient supported/coping well  safe in environment  symptoms relieved/minimized  understands plan of care  verbalize how to care for self  Progress towards outcome:  making progress    Education Record    Learner:  Patient and Family Member  Barriers / Limitations:  Emotional factors  Method:  Discussion  Outcome:  Shows understanding  Comments: Printed AVS, given to daughter and patient

## 2023-03-02 ENCOUNTER — OFFICE VISIT (OUTPATIENT)
Dept: HEMATOLOGY/ONCOLOGY | Facility: HOSPITAL | Age: 78
End: 2023-03-02
Attending: STUDENT IN AN ORGANIZED HEALTH CARE EDUCATION/TRAINING PROGRAM
Payer: MEDICARE

## 2023-03-02 VITALS
RESPIRATION RATE: 18 BRPM | DIASTOLIC BLOOD PRESSURE: 76 MMHG | HEIGHT: 65 IN | HEART RATE: 86 BPM | SYSTOLIC BLOOD PRESSURE: 144 MMHG | OXYGEN SATURATION: 97 % | WEIGHT: 164 LBS | TEMPERATURE: 98 F | BODY MASS INDEX: 27.32 KG/M2

## 2023-03-02 DIAGNOSIS — C78.7 LIVER METASTASES (HCC): ICD-10-CM

## 2023-03-02 DIAGNOSIS — Z51.11 CHEMOTHERAPY MANAGEMENT, ENCOUNTER FOR: ICD-10-CM

## 2023-03-02 DIAGNOSIS — C25.9 ADENOCARCINOMA OF PANCREAS (HCC): Primary | ICD-10-CM

## 2023-03-02 LAB
ALBUMIN SERPL-MCNC: 3.7 G/DL (ref 3.4–5)
ALBUMIN/GLOB SERPL: 1.1 {RATIO} (ref 1–2)
ALP LIVER SERPL-CCNC: 157 U/L
ALT SERPL-CCNC: 43 U/L
ANION GAP SERPL CALC-SCNC: 6 MMOL/L (ref 0–18)
AST SERPL-CCNC: 27 U/L (ref 15–37)
BASOPHILS # BLD AUTO: 0.01 X10(3) UL (ref 0–0.2)
BASOPHILS NFR BLD AUTO: 0.3 %
BILIRUB SERPL-MCNC: 0.6 MG/DL (ref 0.1–2)
BUN BLD-MCNC: 18 MG/DL (ref 7–18)
BUN/CREAT SERPL: 17.3 (ref 10–20)
CALCIUM BLD-MCNC: 9.1 MG/DL (ref 8.5–10.1)
CANCER AG19-9 SERPL-ACNC: 19 U/ML (ref ?–37)
CHLORIDE SERPL-SCNC: 110 MMOL/L (ref 98–112)
CO2 SERPL-SCNC: 25 MMOL/L (ref 21–32)
CREAT BLD-MCNC: 1.04 MG/DL
DEPRECATED RDW RBC AUTO: 46.2 FL (ref 35.1–46.3)
EOSINOPHIL # BLD AUTO: 0.01 X10(3) UL (ref 0–0.7)
EOSINOPHIL NFR BLD AUTO: 0.3 %
ERYTHROCYTE [DISTWIDTH] IN BLOOD BY AUTOMATED COUNT: 14.2 % (ref 11–15)
GFR SERPLBLD BASED ON 1.73 SQ M-ARVRAT: 55 ML/MIN/1.73M2 (ref 60–?)
GLOBULIN PLAS-MCNC: 3.5 G/DL (ref 2.8–4.4)
GLUCOSE BLD-MCNC: 107 MG/DL (ref 70–99)
HCT VFR BLD AUTO: 36.9 %
HGB BLD-MCNC: 12.2 G/DL
IMM GRANULOCYTES # BLD AUTO: 0.03 X10(3) UL (ref 0–1)
IMM GRANULOCYTES NFR BLD: 0.8 %
LYMPHOCYTES # BLD AUTO: 0.64 X10(3) UL (ref 1–4)
LYMPHOCYTES NFR BLD AUTO: 17.9 %
MCH RBC QN AUTO: 30.7 PG (ref 26–34)
MCHC RBC AUTO-ENTMCNC: 33.1 G/DL (ref 31–37)
MCV RBC AUTO: 92.7 FL
MONOCYTES # BLD AUTO: 0.77 X10(3) UL (ref 0.1–1)
MONOCYTES NFR BLD AUTO: 21.6 %
NEUTROPHILS # BLD AUTO: 2.11 X10 (3) UL (ref 1.5–7.7)
NEUTROPHILS # BLD AUTO: 2.11 X10(3) UL (ref 1.5–7.7)
NEUTROPHILS NFR BLD AUTO: 59.1 %
OSMOLALITY SERPL CALC.SUM OF ELEC: 294 MOSM/KG (ref 275–295)
PLATELET # BLD AUTO: 190 10(3)UL (ref 150–450)
POTASSIUM SERPL-SCNC: 3.9 MMOL/L (ref 3.5–5.1)
PROT SERPL-MCNC: 7.2 G/DL (ref 6.4–8.2)
RBC # BLD AUTO: 3.98 X10(6)UL
SODIUM SERPL-SCNC: 141 MMOL/L (ref 136–145)
WBC # BLD AUTO: 3.6 X10(3) UL (ref 4–11)

## 2023-03-02 PROCEDURE — 96417 CHEMO IV INFUS EACH ADDL SEQ: CPT

## 2023-03-02 PROCEDURE — 86301 IMMUNOASSAY TUMOR CA 19-9: CPT

## 2023-03-02 PROCEDURE — 85025 COMPLETE CBC W/AUTO DIFF WBC: CPT

## 2023-03-02 PROCEDURE — 96375 TX/PRO/DX INJ NEW DRUG ADDON: CPT

## 2023-03-02 PROCEDURE — 99215 OFFICE O/P EST HI 40 MIN: CPT | Performed by: STUDENT IN AN ORGANIZED HEALTH CARE EDUCATION/TRAINING PROGRAM

## 2023-03-02 PROCEDURE — 96413 CHEMO IV INFUSION 1 HR: CPT

## 2023-03-02 PROCEDURE — 80053 COMPREHEN METABOLIC PANEL: CPT

## 2023-03-02 RX ORDER — HEPARIN SODIUM (PORCINE) LOCK FLUSH IV SOLN 100 UNIT/ML 100 UNIT/ML
SOLUTION INTRAVENOUS
Status: COMPLETED
Start: 2023-03-02 | End: 2023-03-02

## 2023-03-02 RX ORDER — ESCITALOPRAM OXALATE 5 MG/1
5 TABLET ORAL DAILY
Qty: 30 TABLET | Refills: 1 | Status: SHIPPED | OUTPATIENT
Start: 2023-03-02

## 2023-03-02 RX ADMIN — HEPARIN SODIUM (PORCINE) LOCK FLUSH IV SOLN 100 UNIT/ML 500 UNITS: 100 SOLUTION INTRAVENOUS at 16:21:00

## 2023-03-02 NOTE — PROGRESS NOTES
Pt here for C2D1 Abraxane/Gemzar. Arrives Ambulating independently, accompanied by Family member           Pregnancy screening: Not applicable    Modifications in dose or schedule: No    Drugs/infusions dual verified for appearance and physical integrity. IV pump settings were dual verified: yes     Frequency of blood return and site check throughout administration: Prior to administration, Prior to each drug and At completion of therapy     Appeared to tolerate infusion, no signs and symptoms of adverse reaction noted.  Discharged to Home, Ambulating independently, accompanied by:Family member    Outpatient Oncology Care Plan  Problem list:  knowledge deficit  Problems related to:  chemotherapy  Interventions:  provided general teaching  Expected outcomes:  understands plan of care  Progress towards outcome:  making progress    Education Record    Learner:  Patient  Barriers / Limitations:  None  Method:  Reinforcement  Outcome:  Shows understanding  Comments:

## 2023-03-08 ENCOUNTER — TELEPHONE (OUTPATIENT)
Dept: HEMATOLOGY/ONCOLOGY | Facility: HOSPITAL | Age: 78
End: 2023-03-08

## 2023-03-08 NOTE — TELEPHONE ENCOUNTER
Writer spoke with patient's daughter regarding appointment for lab at 11:45am. Patient's daughter expressed concerns regarding transportation issues for patient. Writer to inform Virginia MCNALLY to meet with daughter for further assistance tomorrow during appointment.

## 2023-03-09 ENCOUNTER — NURSE ONLY (OUTPATIENT)
Dept: HEMATOLOGY/ONCOLOGY | Facility: HOSPITAL | Age: 78
End: 2023-03-09
Attending: STUDENT IN AN ORGANIZED HEALTH CARE EDUCATION/TRAINING PROGRAM
Payer: MEDICARE

## 2023-03-09 ENCOUNTER — SOCIAL WORK SERVICES (OUTPATIENT)
Dept: HEMATOLOGY/ONCOLOGY | Facility: HOSPITAL | Age: 78
End: 2023-03-09

## 2023-03-09 VITALS
BODY MASS INDEX: 27.02 KG/M2 | WEIGHT: 162.19 LBS | HEART RATE: 82 BPM | TEMPERATURE: 99 F | HEIGHT: 65 IN | DIASTOLIC BLOOD PRESSURE: 72 MMHG | RESPIRATION RATE: 18 BRPM | OXYGEN SATURATION: 99 % | SYSTOLIC BLOOD PRESSURE: 136 MMHG

## 2023-03-09 DIAGNOSIS — Z51.11 CHEMOTHERAPY MANAGEMENT, ENCOUNTER FOR: ICD-10-CM

## 2023-03-09 DIAGNOSIS — C25.9 ADENOCARCINOMA OF PANCREAS (HCC): Primary | ICD-10-CM

## 2023-03-09 DIAGNOSIS — C78.7 LIVER METASTASES (HCC): ICD-10-CM

## 2023-03-09 LAB
ALBUMIN SERPL-MCNC: 3.6 G/DL (ref 3.4–5)
ALBUMIN/GLOB SERPL: 1.1 {RATIO} (ref 1–2)
ALP LIVER SERPL-CCNC: 134 U/L
ALT SERPL-CCNC: 40 U/L
ANION GAP SERPL CALC-SCNC: 5 MMOL/L (ref 0–18)
AST SERPL-CCNC: 27 U/L (ref 15–37)
BASOPHILS # BLD AUTO: 0.01 X10(3) UL (ref 0–0.2)
BASOPHILS NFR BLD AUTO: 0.3 %
BILIRUB SERPL-MCNC: 0.5 MG/DL (ref 0.1–2)
BUN BLD-MCNC: 34 MG/DL (ref 7–18)
BUN/CREAT SERPL: 33.3 (ref 10–20)
CALCIUM BLD-MCNC: 9.2 MG/DL (ref 8.5–10.1)
CHLORIDE SERPL-SCNC: 109 MMOL/L (ref 98–112)
CO2 SERPL-SCNC: 25 MMOL/L (ref 21–32)
CREAT BLD-MCNC: 1.02 MG/DL
DEPRECATED RDW RBC AUTO: 45.6 FL (ref 35.1–46.3)
EOSINOPHIL # BLD AUTO: 0.02 X10(3) UL (ref 0–0.7)
EOSINOPHIL NFR BLD AUTO: 0.6 %
ERYTHROCYTE [DISTWIDTH] IN BLOOD BY AUTOMATED COUNT: 13.6 % (ref 11–15)
GFR SERPLBLD BASED ON 1.73 SQ M-ARVRAT: 56 ML/MIN/1.73M2 (ref 60–?)
GLOBULIN PLAS-MCNC: 3.3 G/DL (ref 2.8–4.4)
GLUCOSE BLD-MCNC: 118 MG/DL (ref 70–99)
HCT VFR BLD AUTO: 34.8 %
HGB BLD-MCNC: 11.7 G/DL
IMM GRANULOCYTES # BLD AUTO: 0.02 X10(3) UL (ref 0–1)
IMM GRANULOCYTES NFR BLD: 0.6 %
LYMPHOCYTES # BLD AUTO: 0.67 X10(3) UL (ref 1–4)
LYMPHOCYTES NFR BLD AUTO: 20.4 %
MCH RBC QN AUTO: 30.8 PG (ref 26–34)
MCHC RBC AUTO-ENTMCNC: 33.6 G/DL (ref 31–37)
MCV RBC AUTO: 91.6 FL
MONOCYTES # BLD AUTO: 0.2 X10(3) UL (ref 0.1–1)
MONOCYTES NFR BLD AUTO: 6.1 %
NEUTROPHILS # BLD AUTO: 2.36 X10 (3) UL (ref 1.5–7.7)
NEUTROPHILS # BLD AUTO: 2.36 X10(3) UL (ref 1.5–7.7)
NEUTROPHILS NFR BLD AUTO: 72 %
OSMOLALITY SERPL CALC.SUM OF ELEC: 297 MOSM/KG (ref 275–295)
PLATELET # BLD AUTO: 165 10(3)UL (ref 150–450)
POTASSIUM SERPL-SCNC: 4 MMOL/L (ref 3.5–5.1)
PROT SERPL-MCNC: 6.9 G/DL (ref 6.4–8.2)
RBC # BLD AUTO: 3.8 X10(6)UL
SODIUM SERPL-SCNC: 139 MMOL/L (ref 136–145)
WBC # BLD AUTO: 3.3 X10(3) UL (ref 4–11)

## 2023-03-09 PROCEDURE — 96375 TX/PRO/DX INJ NEW DRUG ADDON: CPT

## 2023-03-09 PROCEDURE — 99215 OFFICE O/P EST HI 40 MIN: CPT | Performed by: STUDENT IN AN ORGANIZED HEALTH CARE EDUCATION/TRAINING PROGRAM

## 2023-03-09 PROCEDURE — 80053 COMPREHEN METABOLIC PANEL: CPT

## 2023-03-09 PROCEDURE — 96413 CHEMO IV INFUSION 1 HR: CPT

## 2023-03-09 PROCEDURE — 96417 CHEMO IV INFUS EACH ADDL SEQ: CPT

## 2023-03-09 PROCEDURE — 85025 COMPLETE CBC W/AUTO DIFF WBC: CPT

## 2023-03-09 RX ORDER — HEPARIN SODIUM (PORCINE) LOCK FLUSH IV SOLN 100 UNIT/ML 100 UNIT/ML
SOLUTION INTRAVENOUS
Status: DISCONTINUED
Start: 2023-03-09 | End: 2023-03-09

## 2023-03-09 NOTE — PROGRESS NOTES
CHASE met with patient's daughter Panfilo Ball today to discuss family dynamics as it pertains to caring for patient. Panfilo Ball verbalized her feelings regarding being primary caregiver for her mother and how her two siblings have not offered any assistance. Panfilo Ball feels guilty for not be able to drive patient to her appointments all the time but she understands the importance of setting healthy boundaries in regards to her time and energy. CHASE encouraged Panfilo Ball to utilize resources available to her such as [employer] to schedule rides on the days she has other obligations. CHASE talked about the importance of self care and the need for her to prioritize self more. CHASE offered Panfilo Ball resources for additional support services for patient and her self such as the [employer] and list of individual counselors in the area. CHASE provided business card and prompted Panfilo Ball to contact CHASE with any additional needs.

## 2023-03-09 NOTE — PROGRESS NOTES
Pt here for C2D8 Abraxane/Gemzar. Arrives Ambulating independently, accompanied by Family member           Pregnancy screening: Not applicable    Modifications in dose or schedule: No    Drugs/infusions dual verified for appearance and physical integrity. IV pump settings were dual verified: yes     Frequency of blood return and site check throughout administration: Prior to administration, Prior to each drug and At completion of therapy     Appeared to tolerate infusion, no signs and symptoms of adverse reaction noted.  Discharged to Home, Ambulating independently, accompanied by:Family member    Outpatient Oncology Care Plan  Problem list:  knowledge deficit  Problems related to:  chemotherapy  Interventions:  provided general teaching  Expected outcomes:  understands plan of care  Progress towards outcome:  making progress    Education Record    Learner:  Patient  Barriers / Limitations:  None  Method:  Reinforcement  Outcome:  Shows understanding  Comments:

## 2023-03-23 ENCOUNTER — OFFICE VISIT (OUTPATIENT)
Dept: HEMATOLOGY/ONCOLOGY | Facility: HOSPITAL | Age: 78
End: 2023-03-23
Attending: STUDENT IN AN ORGANIZED HEALTH CARE EDUCATION/TRAINING PROGRAM
Payer: MEDICARE

## 2023-03-23 VITALS
TEMPERATURE: 98 F | DIASTOLIC BLOOD PRESSURE: 68 MMHG | WEIGHT: 161 LBS | HEART RATE: 88 BPM | OXYGEN SATURATION: 99 % | BODY MASS INDEX: 27 KG/M2 | SYSTOLIC BLOOD PRESSURE: 143 MMHG | RESPIRATION RATE: 20 BRPM

## 2023-03-23 DIAGNOSIS — C25.9 ADENOCARCINOMA OF PANCREAS (HCC): Primary | ICD-10-CM

## 2023-03-23 DIAGNOSIS — Z51.11 CHEMOTHERAPY MANAGEMENT, ENCOUNTER FOR: ICD-10-CM

## 2023-03-23 DIAGNOSIS — C78.7 LIVER METASTASES: ICD-10-CM

## 2023-03-23 LAB
ALBUMIN SERPL-MCNC: 3.5 G/DL (ref 3.4–5)
ALBUMIN/GLOB SERPL: 1.1 {RATIO} (ref 1–2)
ALP LIVER SERPL-CCNC: 120 U/L
ALT SERPL-CCNC: 32 U/L
ANION GAP SERPL CALC-SCNC: 7 MMOL/L (ref 0–18)
AST SERPL-CCNC: 18 U/L (ref 15–37)
BASOPHILS # BLD AUTO: 0.02 X10(3) UL (ref 0–0.2)
BASOPHILS NFR BLD AUTO: 0.5 %
BILIRUB SERPL-MCNC: 0.4 MG/DL (ref 0.1–2)
BUN BLD-MCNC: 30 MG/DL (ref 7–18)
BUN/CREAT SERPL: 33 (ref 10–20)
CALCIUM BLD-MCNC: 8.9 MG/DL (ref 8.5–10.1)
CANCER AG19-9 SERPL-ACNC: 10.8 U/ML (ref ?–37)
CHLORIDE SERPL-SCNC: 107 MMOL/L (ref 98–112)
CO2 SERPL-SCNC: 23 MMOL/L (ref 21–32)
CREAT BLD-MCNC: 0.91 MG/DL
DEPRECATED RDW RBC AUTO: 49.8 FL (ref 35.1–46.3)
EOSINOPHIL # BLD AUTO: 0.02 X10(3) UL (ref 0–0.7)
EOSINOPHIL NFR BLD AUTO: 0.5 %
ERYTHROCYTE [DISTWIDTH] IN BLOOD BY AUTOMATED COUNT: 15.6 % (ref 11–15)
GFR SERPLBLD BASED ON 1.73 SQ M-ARVRAT: 65 ML/MIN/1.73M2 (ref 60–?)
GLOBULIN PLAS-MCNC: 3.1 G/DL (ref 2.8–4.4)
GLUCOSE BLD-MCNC: 115 MG/DL (ref 70–99)
HCT VFR BLD AUTO: 33 %
HGB BLD-MCNC: 11.3 G/DL
IMM GRANULOCYTES # BLD AUTO: 0.04 X10(3) UL (ref 0–1)
IMM GRANULOCYTES NFR BLD: 0.9 %
LYMPHOCYTES # BLD AUTO: 0.56 X10(3) UL (ref 1–4)
LYMPHOCYTES NFR BLD AUTO: 13.3 %
MCH RBC QN AUTO: 31.6 PG (ref 26–34)
MCHC RBC AUTO-ENTMCNC: 34.2 G/DL (ref 31–37)
MCV RBC AUTO: 92.2 FL
MONOCYTES # BLD AUTO: 0.7 X10(3) UL (ref 0.1–1)
MONOCYTES NFR BLD AUTO: 16.6 %
NEUTROPHILS # BLD AUTO: 2.88 X10 (3) UL (ref 1.5–7.7)
NEUTROPHILS # BLD AUTO: 2.88 X10(3) UL (ref 1.5–7.7)
NEUTROPHILS NFR BLD AUTO: 68.2 %
OSMOLALITY SERPL CALC.SUM OF ELEC: 291 MOSM/KG (ref 275–295)
PLATELET # BLD AUTO: 167 10(3)UL (ref 150–450)
POTASSIUM SERPL-SCNC: 3.4 MMOL/L (ref 3.5–5.1)
PROT SERPL-MCNC: 6.6 G/DL (ref 6.4–8.2)
RBC # BLD AUTO: 3.58 X10(6)UL
SODIUM SERPL-SCNC: 137 MMOL/L (ref 136–145)
WBC # BLD AUTO: 4.2 X10(3) UL (ref 4–11)

## 2023-03-23 PROCEDURE — 80053 COMPREHEN METABOLIC PANEL: CPT

## 2023-03-23 PROCEDURE — 96367 TX/PROPH/DG ADDL SEQ IV INF: CPT

## 2023-03-23 PROCEDURE — 96417 CHEMO IV INFUS EACH ADDL SEQ: CPT

## 2023-03-23 PROCEDURE — 96413 CHEMO IV INFUSION 1 HR: CPT

## 2023-03-23 PROCEDURE — 99215 OFFICE O/P EST HI 40 MIN: CPT | Performed by: STUDENT IN AN ORGANIZED HEALTH CARE EDUCATION/TRAINING PROGRAM

## 2023-03-23 PROCEDURE — 85025 COMPLETE CBC W/AUTO DIFF WBC: CPT

## 2023-03-23 PROCEDURE — 86301 IMMUNOASSAY TUMOR CA 19-9: CPT

## 2023-03-23 PROCEDURE — 36591 DRAW BLOOD OFF VENOUS DEVICE: CPT

## 2023-03-23 RX ORDER — SODIUM CHLORIDE 9 MG/ML
10 INJECTION INTRAVENOUS ONCE
OUTPATIENT
Start: 2023-03-23

## 2023-03-23 RX ORDER — HEPARIN SODIUM (PORCINE) LOCK FLUSH IV SOLN 100 UNIT/ML 100 UNIT/ML
SOLUTION INTRAVENOUS
Status: DISPENSED
Start: 2023-03-23 | End: 2023-03-24

## 2023-03-23 RX ORDER — HEPARIN SODIUM (PORCINE) LOCK FLUSH IV SOLN 100 UNIT/ML 100 UNIT/ML
5 SOLUTION INTRAVENOUS ONCE
OUTPATIENT
Start: 2023-03-23

## 2023-03-23 RX ORDER — SODIUM CHLORIDE 9 MG/ML
10 INJECTION INTRAVENOUS ONCE
Status: DISCONTINUED | OUTPATIENT
Start: 2023-03-23 | End: 2023-03-23

## 2023-03-23 RX ORDER — HEPARIN SODIUM (PORCINE) LOCK FLUSH IV SOLN 100 UNIT/ML 100 UNIT/ML
5 SOLUTION INTRAVENOUS ONCE
Status: COMPLETED | OUTPATIENT
Start: 2023-03-23 | End: 2023-03-23

## 2023-03-23 RX ADMIN — HEPARIN SODIUM (PORCINE) LOCK FLUSH IV SOLN 100 UNIT/ML 500 UNITS: 100 SOLUTION INTRAVENOUS at 16:15:00

## 2023-03-23 NOTE — PROGRESS NOTES
Pt here for C3D1 Abraxane/Gemzar. Arrives Ambulating independently, accompanied by self  Pt is feeling well, offers no complaints            Pregnancy screening: Not applicable    Modifications in dose or schedule: No    Drugs/infusions dual verified for appearance and physical integrity. IV pump settings were dual verified: yes     Frequency of blood return and site check throughout administration: Prior to administration, Prior to each drug and At completion of therapy     Appeared to tolerate infusion, no signs and symptoms of adverse reaction noted.  Discharged to Home, Ambulating independently, accompanied by:Family member    Outpatient Oncology Care Plan  Problem list:  knowledge deficit  Problems related to:  chemotherapy  Interventions:  provided general teaching  Expected outcomes:  understands plan of care  Progress towards outcome:  making progress    Education Record    Learner:  Patient  Barriers / Limitations:  None  Method:  Reinforcement  Outcome:  Shows understanding  Comments:

## 2023-03-30 ENCOUNTER — OFFICE VISIT (OUTPATIENT)
Dept: HEMATOLOGY/ONCOLOGY | Facility: HOSPITAL | Age: 78
End: 2023-03-30
Attending: STUDENT IN AN ORGANIZED HEALTH CARE EDUCATION/TRAINING PROGRAM
Payer: MEDICARE

## 2023-03-30 VITALS
HEART RATE: 94 BPM | DIASTOLIC BLOOD PRESSURE: 71 MMHG | RESPIRATION RATE: 18 BRPM | SYSTOLIC BLOOD PRESSURE: 144 MMHG | OXYGEN SATURATION: 100 % | TEMPERATURE: 98 F

## 2023-03-30 DIAGNOSIS — C25.9 ADENOCARCINOMA OF PANCREAS (HCC): Primary | ICD-10-CM

## 2023-03-30 DIAGNOSIS — Z51.11 CHEMOTHERAPY MANAGEMENT, ENCOUNTER FOR: ICD-10-CM

## 2023-03-30 LAB
ALBUMIN SERPL-MCNC: 3.5 G/DL (ref 3.4–5)
ALBUMIN/GLOB SERPL: 1.1 {RATIO} (ref 1–2)
ALP LIVER SERPL-CCNC: 131 U/L
ALT SERPL-CCNC: 44 U/L
ANION GAP SERPL CALC-SCNC: 6 MMOL/L (ref 0–18)
AST SERPL-CCNC: 36 U/L (ref 15–37)
BASOPHILS # BLD AUTO: 0.02 X10(3) UL (ref 0–0.2)
BASOPHILS NFR BLD AUTO: 0.5 %
BILIRUB SERPL-MCNC: 0.7 MG/DL (ref 0.1–2)
BUN BLD-MCNC: 21 MG/DL (ref 7–18)
BUN/CREAT SERPL: 22.1 (ref 10–20)
CALCIUM BLD-MCNC: 9.2 MG/DL (ref 8.5–10.1)
CHLORIDE SERPL-SCNC: 105 MMOL/L (ref 98–112)
CO2 SERPL-SCNC: 26 MMOL/L (ref 21–32)
CREAT BLD-MCNC: 0.95 MG/DL
DEPRECATED RDW RBC AUTO: 49.8 FL (ref 35.1–46.3)
EOSINOPHIL # BLD AUTO: 0.01 X10(3) UL (ref 0–0.7)
EOSINOPHIL NFR BLD AUTO: 0.3 %
ERYTHROCYTE [DISTWIDTH] IN BLOOD BY AUTOMATED COUNT: 15.1 % (ref 11–15)
FASTING STATUS PATIENT QL REPORTED: NO
GFR SERPLBLD BASED ON 1.73 SQ M-ARVRAT: 61 ML/MIN/1.73M2 (ref 60–?)
GLOBULIN PLAS-MCNC: 3.2 G/DL (ref 2.8–4.4)
GLUCOSE BLD-MCNC: 109 MG/DL (ref 70–99)
HCT VFR BLD AUTO: 31.8 %
HGB BLD-MCNC: 10.9 G/DL
IMM GRANULOCYTES # BLD AUTO: 0.05 X10(3) UL (ref 0–1)
IMM GRANULOCYTES NFR BLD: 1.3 %
LYMPHOCYTES # BLD AUTO: 0.74 X10(3) UL (ref 1–4)
LYMPHOCYTES NFR BLD AUTO: 19.9 %
MCH RBC QN AUTO: 31.5 PG (ref 26–34)
MCHC RBC AUTO-ENTMCNC: 34.3 G/DL (ref 31–37)
MCV RBC AUTO: 91.9 FL
MONOCYTES # BLD AUTO: 0.36 X10(3) UL (ref 0.1–1)
MONOCYTES NFR BLD AUTO: 9.7 %
NEUTROPHILS # BLD AUTO: 2.54 X10 (3) UL (ref 1.5–7.7)
NEUTROPHILS # BLD AUTO: 2.54 X10(3) UL (ref 1.5–7.7)
NEUTROPHILS NFR BLD AUTO: 68.3 %
OSMOLALITY SERPL CALC.SUM OF ELEC: 288 MOSM/KG (ref 275–295)
PLATELET # BLD AUTO: 163 10(3)UL (ref 150–450)
POTASSIUM SERPL-SCNC: 3.4 MMOL/L (ref 3.5–5.1)
PROT SERPL-MCNC: 6.7 G/DL (ref 6.4–8.2)
RBC # BLD AUTO: 3.46 X10(6)UL
SODIUM SERPL-SCNC: 137 MMOL/L (ref 136–145)
WBC # BLD AUTO: 3.7 X10(3) UL (ref 4–11)

## 2023-03-30 PROCEDURE — 96417 CHEMO IV INFUS EACH ADDL SEQ: CPT

## 2023-03-30 PROCEDURE — 96375 TX/PRO/DX INJ NEW DRUG ADDON: CPT

## 2023-03-30 PROCEDURE — 80053 COMPREHEN METABOLIC PANEL: CPT

## 2023-03-30 PROCEDURE — 85025 COMPLETE CBC W/AUTO DIFF WBC: CPT

## 2023-03-30 PROCEDURE — 96413 CHEMO IV INFUSION 1 HR: CPT

## 2023-03-30 RX ORDER — HEPARIN SODIUM (PORCINE) LOCK FLUSH IV SOLN 100 UNIT/ML 100 UNIT/ML
SOLUTION INTRAVENOUS
Status: COMPLETED
Start: 2023-03-30 | End: 2023-03-30

## 2023-03-30 RX ADMIN — HEPARIN SODIUM (PORCINE) LOCK FLUSH IV SOLN 100 UNIT/ML: 100 SOLUTION INTRAVENOUS at 17:16:00

## 2023-03-30 NOTE — PROGRESS NOTES
Pt here for C3D8 Abraxane and Gemzar. Arrives Ambulating independently, accompanied by Self           Pregnancy screening: Not applicable    Modifications in dose or schedule: No    Drugs/infusions dual verified for appearance and physical integrity.  IV pump settings were dual verified: yes     Frequency of blood return and site check throughout administration: Prior to administration, Prior to each drug and At completion of therapy   Discharged to Home, Ambulating independently, accompanied by:Self    Education Record    Learner:  Patient  Barriers / Limitations:  None  Method:  Reinforcement  Outcome:  Shows understanding  Comments:

## 2023-04-04 RX ORDER — LANSOPRAZOLE 30 MG/1
CAPSULE, DELAYED RELEASE ORAL
Qty: 90 CAPSULE | Refills: 0 | Status: SHIPPED | OUTPATIENT
Start: 2023-04-04

## 2023-04-13 ENCOUNTER — OFFICE VISIT (OUTPATIENT)
Dept: HEMATOLOGY/ONCOLOGY | Facility: HOSPITAL | Age: 78
End: 2023-04-13
Attending: STUDENT IN AN ORGANIZED HEALTH CARE EDUCATION/TRAINING PROGRAM
Payer: MEDICARE

## 2023-04-13 VITALS
OXYGEN SATURATION: 98 % | BODY MASS INDEX: 26 KG/M2 | DIASTOLIC BLOOD PRESSURE: 60 MMHG | SYSTOLIC BLOOD PRESSURE: 136 MMHG | RESPIRATION RATE: 20 BRPM | HEART RATE: 93 BPM | WEIGHT: 153.38 LBS | TEMPERATURE: 99 F

## 2023-04-13 DIAGNOSIS — Z51.11 CHEMOTHERAPY MANAGEMENT, ENCOUNTER FOR: ICD-10-CM

## 2023-04-13 DIAGNOSIS — T45.1X5A CHEMOTHERAPY-INDUCED NAUSEA: ICD-10-CM

## 2023-04-13 DIAGNOSIS — C25.9 ADENOCARCINOMA OF PANCREAS (HCC): Primary | ICD-10-CM

## 2023-04-13 DIAGNOSIS — R11.0 CHEMOTHERAPY-INDUCED NAUSEA: ICD-10-CM

## 2023-04-13 LAB
ALBUMIN SERPL-MCNC: 3.6 G/DL (ref 3.4–5)
ALBUMIN/GLOB SERPL: 1.1 {RATIO} (ref 1–2)
ALP LIVER SERPL-CCNC: 113 U/L
ALT SERPL-CCNC: 35 U/L
ANION GAP SERPL CALC-SCNC: 10 MMOL/L (ref 0–18)
AST SERPL-CCNC: 31 U/L (ref 15–37)
BASOPHILS # BLD AUTO: 0.04 X10(3) UL (ref 0–0.2)
BASOPHILS NFR BLD AUTO: 0.8 %
BILIRUB SERPL-MCNC: 0.7 MG/DL (ref 0.1–2)
BUN BLD-MCNC: 26 MG/DL (ref 7–18)
BUN/CREAT SERPL: 23.4 (ref 10–20)
CALCIUM BLD-MCNC: 9.3 MG/DL (ref 8.5–10.1)
CANCER AG19-9 SERPL-ACNC: 19.6 U/ML (ref ?–37)
CHLORIDE SERPL-SCNC: 105 MMOL/L (ref 98–112)
CO2 SERPL-SCNC: 23 MMOL/L (ref 21–32)
CREAT BLD-MCNC: 1.11 MG/DL
DEPRECATED RDW RBC AUTO: 54.8 FL (ref 35.1–46.3)
EOSINOPHIL # BLD AUTO: 0.02 X10(3) UL (ref 0–0.7)
EOSINOPHIL NFR BLD AUTO: 0.4 %
ERYTHROCYTE [DISTWIDTH] IN BLOOD BY AUTOMATED COUNT: 16.7 % (ref 11–15)
GFR SERPLBLD BASED ON 1.73 SQ M-ARVRAT: 51 ML/MIN/1.73M2 (ref 60–?)
GLOBULIN PLAS-MCNC: 3.3 G/DL (ref 2.8–4.4)
GLUCOSE BLD-MCNC: 116 MG/DL (ref 70–99)
HCT VFR BLD AUTO: 32.4 %
HGB BLD-MCNC: 10.9 G/DL
IMM GRANULOCYTES # BLD AUTO: 0.09 X10(3) UL (ref 0–1)
IMM GRANULOCYTES NFR BLD: 1.9 %
LYMPHOCYTES # BLD AUTO: 0.71 X10(3) UL (ref 1–4)
LYMPHOCYTES NFR BLD AUTO: 14.7 %
MCH RBC QN AUTO: 31.1 PG (ref 26–34)
MCHC RBC AUTO-ENTMCNC: 33.6 G/DL (ref 31–37)
MCV RBC AUTO: 92.3 FL
MONOCYTES # BLD AUTO: 1.05 X10(3) UL (ref 0.1–1)
MONOCYTES NFR BLD AUTO: 21.7 %
NEUTROPHILS # BLD AUTO: 2.92 X10 (3) UL (ref 1.5–7.7)
NEUTROPHILS # BLD AUTO: 2.92 X10(3) UL (ref 1.5–7.7)
NEUTROPHILS NFR BLD AUTO: 60.5 %
OSMOLALITY SERPL CALC.SUM OF ELEC: 292 MOSM/KG (ref 275–295)
PLATELET # BLD AUTO: 220 10(3)UL (ref 150–450)
POTASSIUM SERPL-SCNC: 3.2 MMOL/L (ref 3.5–5.1)
PROT SERPL-MCNC: 6.9 G/DL (ref 6.4–8.2)
RBC # BLD AUTO: 3.51 X10(6)UL
SODIUM SERPL-SCNC: 138 MMOL/L (ref 136–145)
WBC # BLD AUTO: 4.8 X10(3) UL (ref 4–11)

## 2023-04-13 PROCEDURE — 96375 TX/PRO/DX INJ NEW DRUG ADDON: CPT

## 2023-04-13 PROCEDURE — 80053 COMPREHEN METABOLIC PANEL: CPT

## 2023-04-13 PROCEDURE — 96417 CHEMO IV INFUS EACH ADDL SEQ: CPT

## 2023-04-13 PROCEDURE — 85025 COMPLETE CBC W/AUTO DIFF WBC: CPT

## 2023-04-13 PROCEDURE — 96413 CHEMO IV INFUSION 1 HR: CPT

## 2023-04-13 PROCEDURE — 86301 IMMUNOASSAY TUMOR CA 19-9: CPT

## 2023-04-13 PROCEDURE — 96367 TX/PROPH/DG ADDL SEQ IV INF: CPT

## 2023-04-13 PROCEDURE — 99215 OFFICE O/P EST HI 40 MIN: CPT | Performed by: STUDENT IN AN ORGANIZED HEALTH CARE EDUCATION/TRAINING PROGRAM

## 2023-04-13 RX ORDER — SODIUM CHLORIDE 9 MG/ML
10 INJECTION INTRAVENOUS ONCE
OUTPATIENT
Start: 2023-04-13

## 2023-04-13 RX ORDER — HEPARIN SODIUM (PORCINE) LOCK FLUSH IV SOLN 100 UNIT/ML 100 UNIT/ML
5 SOLUTION INTRAVENOUS ONCE
OUTPATIENT
Start: 2023-04-13

## 2023-04-13 RX ORDER — HEPARIN SODIUM (PORCINE) LOCK FLUSH IV SOLN 100 UNIT/ML 100 UNIT/ML
5 SOLUTION INTRAVENOUS ONCE
Status: COMPLETED | OUTPATIENT
Start: 2023-04-13 | End: 2023-04-13

## 2023-04-13 RX ORDER — HEPARIN SODIUM (PORCINE) LOCK FLUSH IV SOLN 100 UNIT/ML 100 UNIT/ML
SOLUTION INTRAVENOUS
Status: COMPLETED
Start: 2023-04-13 | End: 2023-04-13

## 2023-04-13 RX ADMIN — HEPARIN SODIUM (PORCINE) LOCK FLUSH IV SOLN 100 UNIT/ML 500 UNITS: 100 SOLUTION INTRAVENOUS at 18:00:00

## 2023-04-13 NOTE — PROGRESS NOTES
Pt here for Serafin Verdin 32. Arrives Ambulating independently, accompanied by Self       Oral medications included in this regimen:  no    Patient confirms comprehension of cancer treatment schedule:  yes    Pregnancy screening:  Denies possibility of pregnancy    Modifications in dose or schedule:  No    Medications appearance and physical integrity checked by RN.  Chemotherapy IV pump settings verified by 2 RNs:  yes     Frequency of blood return and site check throughout administration: Prior to administration, Prior to each drug and At completion of therapy     Infusion/treatment outcome:  patient tolerated treatment without incident    Education Record    Learner:  Patient  Barriers / Limitations:  None  Method:  Discussion  Education / instructions given:  Fatigue, upcoming appointments  Outcome:  Shows understanding    Discharged Home, Ambulating independently, accompanied by:Self    Patient/family verbalized understanding of future appointments: by printed AVS

## 2023-04-14 ENCOUNTER — SOCIAL WORK SERVICES (OUTPATIENT)
Dept: HEMATOLOGY/ONCOLOGY | Facility: HOSPITAL | Age: 78
End: 2023-04-14

## 2023-04-14 ENCOUNTER — TELEPHONE (OUTPATIENT)
Dept: HEMATOLOGY/ONCOLOGY | Facility: HOSPITAL | Age: 78
End: 2023-04-14

## 2023-04-14 NOTE — TELEPHONE ENCOUNTER
Patient calling and would like to get a wheelchair.     She is asking if Dr. Lia Sanders  Can write a script    Please advise

## 2023-04-14 NOTE — PROGRESS NOTES
SW spoke with patient via phone call  About ordering a wheelchair. Patient states she is ready to get out and go to the zoo and the [facility]. SW sent referral to [facility] for the wheelchair.  CHASE spoke with Gardner Hodgkins liaison with [employer] to inform her of referral. Gardner Hodgkins to follow up with writer with status of referral.

## 2023-04-20 ENCOUNTER — OFFICE VISIT (OUTPATIENT)
Dept: HEMATOLOGY/ONCOLOGY | Facility: HOSPITAL | Age: 78
End: 2023-04-20
Attending: STUDENT IN AN ORGANIZED HEALTH CARE EDUCATION/TRAINING PROGRAM
Payer: MEDICARE

## 2023-04-20 VITALS
BODY MASS INDEX: 25 KG/M2 | RESPIRATION RATE: 18 BRPM | TEMPERATURE: 99 F | SYSTOLIC BLOOD PRESSURE: 141 MMHG | HEART RATE: 98 BPM | DIASTOLIC BLOOD PRESSURE: 63 MMHG | WEIGHT: 151.81 LBS | OXYGEN SATURATION: 99 %

## 2023-04-20 DIAGNOSIS — Z51.11 CHEMOTHERAPY MANAGEMENT, ENCOUNTER FOR: ICD-10-CM

## 2023-04-20 DIAGNOSIS — C25.9 ADENOCARCINOMA OF PANCREAS (HCC): Primary | ICD-10-CM

## 2023-04-20 LAB
ALBUMIN SERPL-MCNC: 3.3 G/DL (ref 3.4–5)
ALBUMIN/GLOB SERPL: 1 {RATIO} (ref 1–2)
ALP LIVER SERPL-CCNC: 106 U/L
ALT SERPL-CCNC: 50 U/L
ANION GAP SERPL CALC-SCNC: 9 MMOL/L (ref 0–18)
AST SERPL-CCNC: 27 U/L (ref 15–37)
BASOPHILS # BLD AUTO: 0.01 X10(3) UL (ref 0–0.2)
BASOPHILS NFR BLD AUTO: 0.3 %
BILIRUB SERPL-MCNC: 0.8 MG/DL (ref 0.1–2)
BUN BLD-MCNC: 15 MG/DL (ref 7–18)
BUN/CREAT SERPL: 15.5 (ref 10–20)
CALCIUM BLD-MCNC: 8.9 MG/DL (ref 8.5–10.1)
CHLORIDE SERPL-SCNC: 105 MMOL/L (ref 98–112)
CO2 SERPL-SCNC: 24 MMOL/L (ref 21–32)
CREAT BLD-MCNC: 0.97 MG/DL
DEPRECATED RDW RBC AUTO: 53.1 FL (ref 35.1–46.3)
EOSINOPHIL # BLD AUTO: 0.01 X10(3) UL (ref 0–0.7)
EOSINOPHIL NFR BLD AUTO: 0.3 %
ERYTHROCYTE [DISTWIDTH] IN BLOOD BY AUTOMATED COUNT: 16 % (ref 11–15)
FASTING STATUS PATIENT QL REPORTED: NO
GFR SERPLBLD BASED ON 1.73 SQ M-ARVRAT: 60 ML/MIN/1.73M2 (ref 60–?)
GLOBULIN PLAS-MCNC: 3.3 G/DL (ref 2.8–4.4)
GLUCOSE BLD-MCNC: 109 MG/DL (ref 70–99)
HCT VFR BLD AUTO: 29.3 %
HGB BLD-MCNC: 9.9 G/DL
IMM GRANULOCYTES # BLD AUTO: 0.07 X10(3) UL (ref 0–1)
IMM GRANULOCYTES NFR BLD: 1.8 %
LYMPHOCYTES # BLD AUTO: 0.46 X10(3) UL (ref 1–4)
LYMPHOCYTES NFR BLD AUTO: 11.9 %
MCH RBC QN AUTO: 31 PG (ref 26–34)
MCHC RBC AUTO-ENTMCNC: 33.8 G/DL (ref 31–37)
MCV RBC AUTO: 91.8 FL
MONOCYTES # BLD AUTO: 0.39 X10(3) UL (ref 0.1–1)
MONOCYTES NFR BLD AUTO: 10.1 %
NEUTROPHILS # BLD AUTO: 2.91 X10 (3) UL (ref 1.5–7.7)
NEUTROPHILS # BLD AUTO: 2.91 X10(3) UL (ref 1.5–7.7)
NEUTROPHILS NFR BLD AUTO: 75.6 %
OSMOLALITY SERPL CALC.SUM OF ELEC: 287 MOSM/KG (ref 275–295)
PLATELET # BLD AUTO: 123 10(3)UL (ref 150–450)
POTASSIUM SERPL-SCNC: 3.7 MMOL/L (ref 3.5–5.1)
PROT SERPL-MCNC: 6.6 G/DL (ref 6.4–8.2)
RBC # BLD AUTO: 3.19 X10(6)UL
SODIUM SERPL-SCNC: 138 MMOL/L (ref 136–145)
WBC # BLD AUTO: 3.9 X10(3) UL (ref 4–11)

## 2023-04-20 PROCEDURE — 96413 CHEMO IV INFUSION 1 HR: CPT

## 2023-04-20 PROCEDURE — 96375 TX/PRO/DX INJ NEW DRUG ADDON: CPT

## 2023-04-20 PROCEDURE — 80053 COMPREHEN METABOLIC PANEL: CPT

## 2023-04-20 PROCEDURE — 96417 CHEMO IV INFUS EACH ADDL SEQ: CPT

## 2023-04-20 PROCEDURE — 85025 COMPLETE CBC W/AUTO DIFF WBC: CPT

## 2023-04-20 RX ORDER — HEPARIN SODIUM (PORCINE) LOCK FLUSH IV SOLN 100 UNIT/ML 100 UNIT/ML
SOLUTION INTRAVENOUS
Status: DISCONTINUED
Start: 2023-04-20 | End: 2023-04-20

## 2023-04-20 NOTE — PROGRESS NOTES
Pt here for C4D8 Abraxane/Gemzar. Arrives Ambulating independently, accompanied by Self       Oral medications included in this regimen:  no    Patient confirms comprehension of cancer treatment schedule:  yes    Pregnancy screening:  Not applicable    Modifications in dose or schedule:  Yes, Zofran premedication changed to Aloxi today    Medications appearance and physical integrity checked by RN.  Chemotherapy IV pump settings verified by 2 RNs:  yes     Frequency of blood return and site check throughout administration: Prior to administration and At completion of therapy     Infusion/treatment outcome:  patient tolerated treatment without incident    Education Record    Learner:  Patient  Barriers / Limitations:  None  Method:  Brief focused and Discussion  Education / instructions given:  Reviewed the plan of care today and labs drawn today are apprropriate for treatment  Outcome:  Shows understanding    Discharged Home, Ambulating independently, accompanied by:Self    Patient/family verbalized understanding of future appointments: by [facility]

## 2023-04-29 ENCOUNTER — HOSPITAL ENCOUNTER (OUTPATIENT)
Dept: CT IMAGING | Age: 78
Discharge: HOME | End: 2023-04-29
Attending: STUDENT IN AN ORGANIZED HEALTH CARE EDUCATION/TRAINING PROGRAM
Payer: MEDICARE

## 2023-04-29 DIAGNOSIS — C25.9 ADENOCARCINOMA OF PANCREAS (HCC): ICD-10-CM

## 2023-04-29 PROCEDURE — 74178 CT ABD&PLV WO CNTR FLWD CNTR: CPT | Performed by: STUDENT IN AN ORGANIZED HEALTH CARE EDUCATION/TRAINING PROGRAM

## 2023-04-29 PROCEDURE — 71260 CT THORAX DX C+: CPT | Performed by: STUDENT IN AN ORGANIZED HEALTH CARE EDUCATION/TRAINING PROGRAM

## 2023-05-02 ENCOUNTER — TELEPHONE (OUTPATIENT)
Dept: HEMATOLOGY/ONCOLOGY | Facility: HOSPITAL | Age: 78
End: 2023-05-02

## 2023-05-02 NOTE — TELEPHONE ENCOUNTER
I called Mrs. Doug Mahmood to discuss CT results which demonstrated partial response to systemic therapy. 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 She has more fatigue and is starting to develop cumulative toxicities to the chemotherapy. Therefore, we will continue gemcitabine Abraxane at current dosing but will plan for chemo administration every 2 weeks instead of 2 weeks on 1 week off. She is scheduled to see our PA, Mira Mares, later this week in my absence and I will plan to see her back in approximately 2 weeks.     Mauricio Ascencio, [address] [facility]

## 2023-05-04 ENCOUNTER — OFFICE VISIT (OUTPATIENT)
Dept: HEMATOLOGY/ONCOLOGY | Facility: HOSPITAL | Age: 78
End: 2023-05-04
Attending: STUDENT IN AN ORGANIZED HEALTH CARE EDUCATION/TRAINING PROGRAM
Payer: MEDICARE

## 2023-05-04 ENCOUNTER — OFFICE VISIT (OUTPATIENT)
Dept: HEMATOLOGY/ONCOLOGY | Facility: HOSPITAL | Age: 78
End: 2023-05-04
Attending: PHYSICIAN ASSISTANT
Payer: MEDICARE

## 2023-05-04 ENCOUNTER — SOCIAL WORK SERVICES (OUTPATIENT)
Dept: HEMATOLOGY/ONCOLOGY | Facility: HOSPITAL | Age: 78
End: 2023-05-04

## 2023-05-04 VITALS
RESPIRATION RATE: 18 BRPM | TEMPERATURE: 99 F | HEIGHT: 65 IN | DIASTOLIC BLOOD PRESSURE: 57 MMHG | WEIGHT: 144 LBS | BODY MASS INDEX: 23.99 KG/M2 | SYSTOLIC BLOOD PRESSURE: 109 MMHG | HEART RATE: 97 BPM | OXYGEN SATURATION: 100 %

## 2023-05-04 DIAGNOSIS — Z51.11 CHEMOTHERAPY MANAGEMENT, ENCOUNTER FOR: ICD-10-CM

## 2023-05-04 DIAGNOSIS — C25.9 ADENOCARCINOMA OF PANCREAS (HCC): Primary | ICD-10-CM

## 2023-05-04 DIAGNOSIS — E87.6 HYPOKALEMIA DUE TO INADEQUATE POTASSIUM INTAKE: ICD-10-CM

## 2023-05-04 DIAGNOSIS — R53.1 GENERALIZED WEAKNESS: ICD-10-CM

## 2023-05-04 LAB
ALBUMIN SERPL-MCNC: 3.3 G/DL (ref 3.4–5)
ALBUMIN/GLOB SERPL: 1 {RATIO} (ref 1–2)
ALP LIVER SERPL-CCNC: 104 U/L
ALT SERPL-CCNC: 30 U/L
ANION GAP SERPL CALC-SCNC: 12 MMOL/L (ref 0–18)
AST SERPL-CCNC: 31 U/L (ref 15–37)
BASOPHILS # BLD AUTO: 0.04 X10(3) UL (ref 0–0.2)
BASOPHILS NFR BLD AUTO: 0.7 %
BILIRUB SERPL-MCNC: 1.1 MG/DL (ref 0.1–2)
BUN BLD-MCNC: 17 MG/DL (ref 7–18)
BUN/CREAT SERPL: 12.7 (ref 10–20)
CALCIUM BLD-MCNC: 8.9 MG/DL (ref 8.5–10.1)
CHLORIDE SERPL-SCNC: 102 MMOL/L (ref 98–112)
CO2 SERPL-SCNC: 23 MMOL/L (ref 21–32)
CREAT BLD-MCNC: 1.34 MG/DL
DEPRECATED RDW RBC AUTO: 58.4 FL (ref 35.1–46.3)
EOSINOPHIL # BLD AUTO: 0.05 X10(3) UL (ref 0–0.7)
EOSINOPHIL NFR BLD AUTO: 0.8 %
ERYTHROCYTE [DISTWIDTH] IN BLOOD BY AUTOMATED COUNT: 17.2 % (ref 11–15)
GFR SERPLBLD BASED ON 1.73 SQ M-ARVRAT: 41 ML/MIN/1.73M2 (ref 60–?)
GLOBULIN PLAS-MCNC: 3.2 G/DL (ref 2.8–4.4)
GLUCOSE BLD-MCNC: 122 MG/DL (ref 70–99)
HCT VFR BLD AUTO: 31.7 %
HGB BLD-MCNC: 10.5 G/DL
IMM GRANULOCYTES # BLD AUTO: 0.28 X10(3) UL (ref 0–1)
IMM GRANULOCYTES NFR BLD: 4.6 %
LYMPHOCYTES # BLD AUTO: 0.78 X10(3) UL (ref 1–4)
LYMPHOCYTES NFR BLD AUTO: 12.7 %
MCH RBC QN AUTO: 31.2 PG (ref 26–34)
MCHC RBC AUTO-ENTMCNC: 33.1 G/DL (ref 31–37)
MCV RBC AUTO: 94.1 FL
MONOCYTES # BLD AUTO: 1.24 X10(3) UL (ref 0.1–1)
MONOCYTES NFR BLD AUTO: 20.2 %
NEUTROPHILS # BLD AUTO: 3.74 X10 (3) UL (ref 1.5–7.7)
NEUTROPHILS # BLD AUTO: 3.74 X10(3) UL (ref 1.5–7.7)
NEUTROPHILS NFR BLD AUTO: 61 %
OSMOLALITY SERPL CALC.SUM OF ELEC: 287 MOSM/KG (ref 275–295)
PLATELET # BLD AUTO: 339 10(3)UL (ref 150–450)
POTASSIUM SERPL-SCNC: 3.2 MMOL/L (ref 3.5–5.1)
PROT SERPL-MCNC: 6.5 G/DL (ref 6.4–8.2)
RBC # BLD AUTO: 3.37 X10(6)UL
SODIUM SERPL-SCNC: 137 MMOL/L (ref 136–145)
WBC # BLD AUTO: 6.1 X10(3) UL (ref 4–11)

## 2023-05-04 PROCEDURE — 96366 THER/PROPH/DIAG IV INF ADDON: CPT

## 2023-05-04 PROCEDURE — 85025 COMPLETE CBC W/AUTO DIFF WBC: CPT

## 2023-05-04 PROCEDURE — 99215 OFFICE O/P EST HI 40 MIN: CPT | Performed by: PHYSICIAN ASSISTANT

## 2023-05-04 PROCEDURE — 80053 COMPREHEN METABOLIC PANEL: CPT

## 2023-05-04 PROCEDURE — 96365 THER/PROPH/DIAG IV INF INIT: CPT

## 2023-05-04 RX ORDER — HEPARIN SODIUM (PORCINE) LOCK FLUSH IV SOLN 100 UNIT/ML 100 UNIT/ML
5 SOLUTION INTRAVENOUS ONCE
OUTPATIENT
Start: 2023-05-04

## 2023-05-04 RX ORDER — HEPARIN SODIUM (PORCINE) LOCK FLUSH IV SOLN 100 UNIT/ML 100 UNIT/ML
5 SOLUTION INTRAVENOUS ONCE
Status: COMPLETED | OUTPATIENT
Start: 2023-05-04 | End: 2023-05-04

## 2023-05-04 RX ORDER — SODIUM CHLORIDE 9 MG/ML
10 INJECTION INTRAVENOUS ONCE
OUTPATIENT
Start: 2023-05-04

## 2023-05-04 RX ORDER — HEPARIN SODIUM (PORCINE) LOCK FLUSH IV SOLN 100 UNIT/ML 100 UNIT/ML
SOLUTION INTRAVENOUS
Status: COMPLETED
Start: 2023-05-04 | End: 2023-05-04

## 2023-05-04 RX ADMIN — HEPARIN SODIUM (PORCINE) LOCK FLUSH IV SOLN 100 UNIT/ML 500 UNITS: 100 SOLUTION INTRAVENOUS at 15:38:00

## 2023-05-04 NOTE — PROGRESS NOTES
SW met with patient and her son today to witness her signing the [address] [facility] [address] form. SW provided original signed document and one copy to patient. SW made a copy for to be scanned into EMR by  at the  of the cancer center.

## 2023-05-04 NOTE — PATIENT INSTRUCTIONS
Defer chemotherapy 1 week due to weakness  1L IV fluids today with potassium. Review potassium-containing foods handout  Consider smoothie recipes and other protein drinks for calories. Consider [facility] dietitian consult. Drink 50-60 oz fluid daily  May try Miracle Fruit for taste aversion  Consider lorazepam 0.5 mg at bedtime to ease \"restless leg\".     Biotine products can help with dry mouth - specifically, the mouthwash  Call as needed  Follow-up in 1 week

## 2023-05-04 NOTE — PROGRESS NOTES
Per JOSE Valle, no chemo today. Patient arrives to infusion via wheelchair for 1L IVF with 20mEq of KCl. Patient accompanied by son. PAC previously accessed in lab, + blood return noted. IV fluids infused per order over 2hrs, no issues. PAC deaccessed, site covered with gauze and paper tape. Patient discharged stable with family member. Printed AVS provided reflecting upcoming appts.

## 2023-05-11 ENCOUNTER — OFFICE VISIT (OUTPATIENT)
Dept: HEMATOLOGY/ONCOLOGY | Facility: HOSPITAL | Age: 78
End: 2023-05-11
Attending: STUDENT IN AN ORGANIZED HEALTH CARE EDUCATION/TRAINING PROGRAM
Payer: MEDICARE

## 2023-05-11 VITALS
TEMPERATURE: 99 F | WEIGHT: 146.38 LBS | HEIGHT: 65 IN | DIASTOLIC BLOOD PRESSURE: 72 MMHG | BODY MASS INDEX: 24.39 KG/M2 | SYSTOLIC BLOOD PRESSURE: 129 MMHG | HEART RATE: 98 BPM | OXYGEN SATURATION: 98 % | RESPIRATION RATE: 16 BRPM

## 2023-05-11 DIAGNOSIS — Z51.11 CHEMOTHERAPY MANAGEMENT, ENCOUNTER FOR: ICD-10-CM

## 2023-05-11 DIAGNOSIS — R11.0 CHEMOTHERAPY-INDUCED NAUSEA: ICD-10-CM

## 2023-05-11 DIAGNOSIS — T45.1X5A CHEMOTHERAPY-INDUCED NAUSEA: ICD-10-CM

## 2023-05-11 DIAGNOSIS — E87.6 HYPOKALEMIA DUE TO INADEQUATE POTASSIUM INTAKE: ICD-10-CM

## 2023-05-11 DIAGNOSIS — R53.1 GENERALIZED WEAKNESS: ICD-10-CM

## 2023-05-11 DIAGNOSIS — C25.9 ADENOCARCINOMA OF PANCREAS (HCC): Primary | ICD-10-CM

## 2023-05-11 LAB
ALBUMIN SERPL-MCNC: 3.2 G/DL (ref 3.4–5)
ALBUMIN/GLOB SERPL: 1 {RATIO} (ref 1–2)
ALP LIVER SERPL-CCNC: 104 U/L
ALT SERPL-CCNC: 25 U/L
ANION GAP SERPL CALC-SCNC: 7 MMOL/L (ref 0–18)
AST SERPL-CCNC: 32 U/L (ref 15–37)
BASOPHILS # BLD AUTO: 0.04 X10(3) UL (ref 0–0.2)
BASOPHILS NFR BLD AUTO: 0.5 %
BILIRUB SERPL-MCNC: 0.7 MG/DL (ref 0.1–2)
BUN BLD-MCNC: 16 MG/DL (ref 7–18)
BUN/CREAT SERPL: 14.7 (ref 10–20)
CALCIUM BLD-MCNC: 9 MG/DL (ref 8.5–10.1)
CHLORIDE SERPL-SCNC: 109 MMOL/L (ref 98–112)
CO2 SERPL-SCNC: 22 MMOL/L (ref 21–32)
CREAT BLD-MCNC: 1.09 MG/DL
DEPRECATED RDW RBC AUTO: 62.5 FL (ref 35.1–46.3)
EOSINOPHIL # BLD AUTO: 0.15 X10(3) UL (ref 0–0.7)
EOSINOPHIL NFR BLD AUTO: 1.7 %
ERYTHROCYTE [DISTWIDTH] IN BLOOD BY AUTOMATED COUNT: 17.8 % (ref 11–15)
GFR SERPLBLD BASED ON 1.73 SQ M-ARVRAT: 52 ML/MIN/1.73M2 (ref 60–?)
GLOBULIN PLAS-MCNC: 3.3 G/DL (ref 2.8–4.4)
GLUCOSE BLD-MCNC: 104 MG/DL (ref 70–99)
HCT VFR BLD AUTO: 32.3 %
HGB BLD-MCNC: 10.5 G/DL
IMM GRANULOCYTES # BLD AUTO: 0.34 X10(3) UL (ref 0–1)
IMM GRANULOCYTES NFR BLD: 3.9 %
LYMPHOCYTES # BLD AUTO: 1.65 X10(3) UL (ref 1–4)
LYMPHOCYTES NFR BLD AUTO: 19.1 %
MCH RBC QN AUTO: 31.3 PG (ref 26–34)
MCHC RBC AUTO-ENTMCNC: 32.5 G/DL (ref 31–37)
MCV RBC AUTO: 96.1 FL
MONOCYTES # BLD AUTO: 1.32 X10(3) UL (ref 0.1–1)
MONOCYTES NFR BLD AUTO: 15.3 %
NEUTROPHILS # BLD AUTO: 5.13 X10 (3) UL (ref 1.5–7.7)
NEUTROPHILS # BLD AUTO: 5.13 X10(3) UL (ref 1.5–7.7)
NEUTROPHILS NFR BLD AUTO: 59.5 %
OSMOLALITY SERPL CALC.SUM OF ELEC: 287 MOSM/KG (ref 275–295)
PLATELET # BLD AUTO: 253 10(3)UL (ref 150–450)
POTASSIUM SERPL-SCNC: 3.7 MMOL/L (ref 3.5–5.1)
PROT SERPL-MCNC: 6.5 G/DL (ref 6.4–8.2)
RBC # BLD AUTO: 3.36 X10(6)UL
SODIUM SERPL-SCNC: 138 MMOL/L (ref 136–145)
WBC # BLD AUTO: 8.6 X10(3) UL (ref 4–11)

## 2023-05-11 PROCEDURE — 96375 TX/PRO/DX INJ NEW DRUG ADDON: CPT

## 2023-05-11 PROCEDURE — 99215 OFFICE O/P EST HI 40 MIN: CPT | Performed by: STUDENT IN AN ORGANIZED HEALTH CARE EDUCATION/TRAINING PROGRAM

## 2023-05-11 PROCEDURE — 80053 COMPREHEN METABOLIC PANEL: CPT

## 2023-05-11 PROCEDURE — 96413 CHEMO IV INFUSION 1 HR: CPT

## 2023-05-11 PROCEDURE — 85025 COMPLETE CBC W/AUTO DIFF WBC: CPT

## 2023-05-11 PROCEDURE — 96417 CHEMO IV INFUS EACH ADDL SEQ: CPT

## 2023-05-11 RX ORDER — HEPARIN SODIUM (PORCINE) LOCK FLUSH IV SOLN 100 UNIT/ML 100 UNIT/ML
5 SOLUTION INTRAVENOUS ONCE
OUTPATIENT
Start: 2023-05-11

## 2023-05-11 RX ORDER — HEPARIN SODIUM (PORCINE) LOCK FLUSH IV SOLN 100 UNIT/ML 100 UNIT/ML
SOLUTION INTRAVENOUS
Status: DISPENSED
Start: 2023-05-11 | End: 2023-05-12

## 2023-05-11 RX ORDER — SODIUM CHLORIDE 9 MG/ML
10 INJECTION INTRAVENOUS ONCE
OUTPATIENT
Start: 2023-05-11

## 2023-05-11 RX ORDER — HEPARIN SODIUM (PORCINE) LOCK FLUSH IV SOLN 100 UNIT/ML 100 UNIT/ML
5 SOLUTION INTRAVENOUS ONCE
Status: COMPLETED | OUTPATIENT
Start: 2023-05-11 | End: 2023-05-11

## 2023-05-11 RX ADMIN — HEPARIN SODIUM (PORCINE) LOCK FLUSH IV SOLN 100 UNIT/ML 500 UNITS: 100 SOLUTION INTRAVENOUS at 16:05:00

## 2023-05-11 NOTE — PROGRESS NOTES
Pt here for C5D1 ABRAXANE + GEMZAR. Arrives Ambulating independently, accompanied by Self       Oral medications included in this regimen:  no    Patient confirms comprehension of cancer treatment schedule:  yes    Pregnancy screening:  Denies possibility of pregnancy    Modifications in dose or schedule:  No    Medications appearance and physical integrity checked by RN. Chemotherapy IV pump settings verified by 2 RNs:  yes     Frequency of blood return and site check throughout administration: Prior to administration and At completion of therapy     Infusion/treatment outcome:  patient tolerated treatment without incident    Education Record    Learner:  Patient  Barriers / Limitations:  None  Method:  Discussion  Education / instructions given:  Plan of care; c/o generalized weakness - needing to ambulate with assistive devices at home. Instructed patient of maintaining safe environment and safe ambulation with assistive devices.   Outcome:  Shows understanding    Discharged Home, Ambulating independently, accompanied by:Self    Patient/family verbalized understanding of future appointments: by printed AVS

## 2023-05-18 ENCOUNTER — APPOINTMENT (OUTPATIENT)
Dept: HEMATOLOGY/ONCOLOGY | Facility: HOSPITAL | Age: 78
End: 2023-05-18
Attending: STUDENT IN AN ORGANIZED HEALTH CARE EDUCATION/TRAINING PROGRAM
Payer: MEDICARE

## 2023-05-24 ENCOUNTER — TELEPHONE (OUTPATIENT)
Dept: HEMATOLOGY/ONCOLOGY | Facility: HOSPITAL | Age: 78
End: 2023-05-24

## 2023-05-24 NOTE — TELEPHONE ENCOUNTER
Writer called patient regarding pre-chemo appointment for tomorrow. Per patient she cannot come in any earlier than 11am as her son in law is bringing her who lives far away. Patient would like to remain on every 2 week appointments, 2 weeks off in between treatments. Patient stated that she still has some fatigue and weakness, but improving with the extra time off. Writer discussed the phone call with Dr. Masha Bellamy in which he stated that a pre-chemo appointment is not needed as patient is feeling up to having treatment. She will be having pre-chemo labs done as STAT with treatment.  Advanced practice providers available if additional assessment needed at time of treatment,

## 2023-05-25 ENCOUNTER — OFFICE VISIT (OUTPATIENT)
Dept: HEMATOLOGY/ONCOLOGY | Facility: HOSPITAL | Age: 78
End: 2023-05-25
Attending: STUDENT IN AN ORGANIZED HEALTH CARE EDUCATION/TRAINING PROGRAM
Payer: MEDICARE

## 2023-05-25 VITALS
BODY MASS INDEX: 23 KG/M2 | RESPIRATION RATE: 16 BRPM | OXYGEN SATURATION: 98 % | DIASTOLIC BLOOD PRESSURE: 76 MMHG | SYSTOLIC BLOOD PRESSURE: 138 MMHG | TEMPERATURE: 98 F | WEIGHT: 140.81 LBS | HEART RATE: 93 BPM

## 2023-05-25 DIAGNOSIS — E87.6 HYPOKALEMIA DUE TO INADEQUATE POTASSIUM INTAKE: ICD-10-CM

## 2023-05-25 DIAGNOSIS — R53.1 GENERALIZED WEAKNESS: ICD-10-CM

## 2023-05-25 DIAGNOSIS — Z51.11 CHEMOTHERAPY MANAGEMENT, ENCOUNTER FOR: ICD-10-CM

## 2023-05-25 DIAGNOSIS — C25.9 ADENOCARCINOMA OF PANCREAS (HCC): Primary | ICD-10-CM

## 2023-05-25 LAB
ALBUMIN SERPL-MCNC: 3.5 G/DL (ref 3.4–5)
ALBUMIN/GLOB SERPL: 1.1 {RATIO} (ref 1–2)
ALP LIVER SERPL-CCNC: 100 U/L
ALT SERPL-CCNC: 40 U/L
ANION GAP SERPL CALC-SCNC: 8 MMOL/L (ref 0–18)
AST SERPL-CCNC: 42 U/L (ref 15–37)
BASOPHILS # BLD AUTO: 0.01 X10(3) UL (ref 0–0.2)
BASOPHILS NFR BLD AUTO: 0.3 %
BILIRUB SERPL-MCNC: 1.1 MG/DL (ref 0.1–2)
BUN BLD-MCNC: 15 MG/DL (ref 7–18)
BUN/CREAT SERPL: 13.3 (ref 10–20)
CALCIUM BLD-MCNC: 9.3 MG/DL (ref 8.5–10.1)
CANCER AG19-9 SERPL-ACNC: 9.6 U/ML (ref ?–37)
CHLORIDE SERPL-SCNC: 109 MMOL/L (ref 98–112)
CO2 SERPL-SCNC: 21 MMOL/L (ref 21–32)
CREAT BLD-MCNC: 1.13 MG/DL
DEPRECATED RDW RBC AUTO: 62.2 FL (ref 35.1–46.3)
EOSINOPHIL # BLD AUTO: 0.02 X10(3) UL (ref 0–0.7)
EOSINOPHIL NFR BLD AUTO: 0.7 %
ERYTHROCYTE [DISTWIDTH] IN BLOOD BY AUTOMATED COUNT: 17.1 % (ref 11–15)
GFR SERPLBLD BASED ON 1.73 SQ M-ARVRAT: 50 ML/MIN/1.73M2 (ref 60–?)
GLOBULIN PLAS-MCNC: 3.1 G/DL (ref 2.8–4.4)
GLUCOSE BLD-MCNC: 109 MG/DL (ref 70–99)
HCT VFR BLD AUTO: 29.8 %
HGB BLD-MCNC: 9.6 G/DL
IMM GRANULOCYTES # BLD AUTO: 0.02 X10(3) UL (ref 0–1)
IMM GRANULOCYTES NFR BLD: 0.7 %
LYMPHOCYTES # BLD AUTO: 0.62 X10(3) UL (ref 1–4)
LYMPHOCYTES NFR BLD AUTO: 21.6 %
MCH RBC QN AUTO: 32 PG (ref 26–34)
MCHC RBC AUTO-ENTMCNC: 32.2 G/DL (ref 31–37)
MCV RBC AUTO: 99.3 FL
MONOCYTES # BLD AUTO: 0.47 X10(3) UL (ref 0.1–1)
MONOCYTES NFR BLD AUTO: 16.4 %
NEUTROPHILS # BLD AUTO: 1.73 X10 (3) UL (ref 1.5–7.7)
NEUTROPHILS # BLD AUTO: 1.73 X10(3) UL (ref 1.5–7.7)
NEUTROPHILS NFR BLD AUTO: 60.3 %
OSMOLALITY SERPL CALC.SUM OF ELEC: 287 MOSM/KG (ref 275–295)
PLATELET # BLD AUTO: 126 10(3)UL (ref 150–450)
POTASSIUM SERPL-SCNC: 3.5 MMOL/L (ref 3.5–5.1)
PROT SERPL-MCNC: 6.6 G/DL (ref 6.4–8.2)
RBC # BLD AUTO: 3 X10(6)UL
SODIUM SERPL-SCNC: 138 MMOL/L (ref 136–145)
WBC # BLD AUTO: 2.9 X10(3) UL (ref 4–11)

## 2023-05-25 PROCEDURE — 80053 COMPREHEN METABOLIC PANEL: CPT

## 2023-05-25 PROCEDURE — 96367 TX/PROPH/DG ADDL SEQ IV INF: CPT

## 2023-05-25 PROCEDURE — 96417 CHEMO IV INFUS EACH ADDL SEQ: CPT

## 2023-05-25 PROCEDURE — 85025 COMPLETE CBC W/AUTO DIFF WBC: CPT

## 2023-05-25 PROCEDURE — 96413 CHEMO IV INFUSION 1 HR: CPT

## 2023-05-25 PROCEDURE — 86301 IMMUNOASSAY TUMOR CA 19-9: CPT

## 2023-05-25 RX ORDER — HEPARIN SODIUM (PORCINE) LOCK FLUSH IV SOLN 100 UNIT/ML 100 UNIT/ML
5 SOLUTION INTRAVENOUS ONCE
Status: COMPLETED | OUTPATIENT
Start: 2023-05-25 | End: 2023-05-25

## 2023-05-25 RX ORDER — HEPARIN SODIUM (PORCINE) LOCK FLUSH IV SOLN 100 UNIT/ML 100 UNIT/ML
5 SOLUTION INTRAVENOUS ONCE
OUTPATIENT
Start: 2023-05-25

## 2023-05-25 RX ORDER — SODIUM CHLORIDE 9 MG/ML
10 INJECTION INTRAVENOUS ONCE
OUTPATIENT
Start: 2023-05-25

## 2023-05-25 RX ADMIN — HEPARIN SODIUM (PORCINE) LOCK FLUSH IV SOLN 100 UNIT/ML 500 UNITS: 100 SOLUTION INTRAVENOUS at 15:30:00

## 2023-05-25 NOTE — PROGRESS NOTES
Pt here for C5D8 Abraxane/Gemzar. Arrives Ambulating independently, accompanied by Self. Denies new issues or concerns today, overall reports feeling well. Labs collected via port, results appropriate to proceed with treatment today. Oral medications included in this regimen:  no    Patient confirms comprehension of cancer treatment schedule:  yes    Pregnancy screening:  Not applicable    Modifications in dose or schedule:  No    Medications appearance and physical integrity checked by RN. Chemotherapy IV pump settings verified by 2 RNs:  yes     Frequency of blood return and site check throughout administration: Prior to administration, Prior to each drug and At completion of therapy     Infusion/treatment outcome:  patient tolerated treatment without incident    Education Record    Learner:  Patient  Barriers / Limitations:  None  Method:  Reinforcement  Education / instructions given:  Reviewed plan for day and potential side effects of chemo.   Outcome:  Shows understanding    Discharged Home, Ambulating independently, accompanied by:Self    Patient/family verbalized understanding of future appointments: by printed AVS

## 2023-05-26 ENCOUNTER — TELEPHONE (OUTPATIENT)
Dept: HEMATOLOGY/ONCOLOGY | Facility: HOSPITAL | Age: 78
End: 2023-05-26

## 2023-05-26 NOTE — TELEPHONE ENCOUNTER
Patient requesting calender for chemo to be adjusted to allow an extra week off in between appointments. Patient feels she needs extra recovery time due to chemo induced weakness and fatigue. Next chemo appt to be 6/15.

## 2023-06-01 ENCOUNTER — APPOINTMENT (OUTPATIENT)
Dept: HEMATOLOGY/ONCOLOGY | Facility: HOSPITAL | Age: 78
End: 2023-06-01
Attending: STUDENT IN AN ORGANIZED HEALTH CARE EDUCATION/TRAINING PROGRAM
Payer: MEDICARE

## 2023-06-08 ENCOUNTER — APPOINTMENT (OUTPATIENT)
Dept: HEMATOLOGY/ONCOLOGY | Facility: HOSPITAL | Age: 78
End: 2023-06-08
Attending: STUDENT IN AN ORGANIZED HEALTH CARE EDUCATION/TRAINING PROGRAM
Payer: MEDICARE

## 2023-06-15 ENCOUNTER — OFFICE VISIT (OUTPATIENT)
Dept: HEMATOLOGY/ONCOLOGY | Facility: HOSPITAL | Age: 78
End: 2023-06-15
Attending: STUDENT IN AN ORGANIZED HEALTH CARE EDUCATION/TRAINING PROGRAM
Payer: MEDICARE

## 2023-06-15 VITALS
OXYGEN SATURATION: 99 % | HEART RATE: 90 BPM | TEMPERATURE: 99 F | DIASTOLIC BLOOD PRESSURE: 84 MMHG | BODY MASS INDEX: 23 KG/M2 | RESPIRATION RATE: 16 BRPM | SYSTOLIC BLOOD PRESSURE: 138 MMHG | WEIGHT: 138.5 LBS

## 2023-06-15 DIAGNOSIS — R53.1 GENERALIZED WEAKNESS: ICD-10-CM

## 2023-06-15 DIAGNOSIS — Z51.11 CHEMOTHERAPY MANAGEMENT, ENCOUNTER FOR: ICD-10-CM

## 2023-06-15 DIAGNOSIS — C25.9 ADENOCARCINOMA OF PANCREAS (HCC): Primary | ICD-10-CM

## 2023-06-15 DIAGNOSIS — E87.6 HYPOKALEMIA DUE TO INADEQUATE POTASSIUM INTAKE: ICD-10-CM

## 2023-06-15 LAB
ALBUMIN SERPL-MCNC: 3.6 G/DL (ref 3.4–5)
ALBUMIN/GLOB SERPL: 1.1 {RATIO} (ref 1–2)
ALP LIVER SERPL-CCNC: 86 U/L
ALT SERPL-CCNC: 19 U/L
ANION GAP SERPL CALC-SCNC: 6 MMOL/L (ref 0–18)
AST SERPL-CCNC: 27 U/L (ref 15–37)
BASOPHILS # BLD AUTO: 0.02 X10(3) UL (ref 0–0.2)
BASOPHILS NFR BLD AUTO: 0.4 %
BILIRUB SERPL-MCNC: 0.6 MG/DL (ref 0.1–2)
BUN BLD-MCNC: 13 MG/DL (ref 7–18)
BUN/CREAT SERPL: 11.1 (ref 10–20)
CALCIUM BLD-MCNC: 9.4 MG/DL (ref 8.5–10.1)
CANCER AG19-9 SERPL-ACNC: 5.7 U/ML (ref ?–37)
CHLORIDE SERPL-SCNC: 109 MMOL/L (ref 98–112)
CO2 SERPL-SCNC: 24 MMOL/L (ref 21–32)
CREAT BLD-MCNC: 1.17 MG/DL
DEPRECATED RDW RBC AUTO: 57.1 FL (ref 35.1–46.3)
EOSINOPHIL # BLD AUTO: 0.02 X10(3) UL (ref 0–0.7)
EOSINOPHIL NFR BLD AUTO: 0.4 %
ERYTHROCYTE [DISTWIDTH] IN BLOOD BY AUTOMATED COUNT: 15.8 % (ref 11–15)
GFR SERPLBLD BASED ON 1.73 SQ M-ARVRAT: 48 ML/MIN/1.73M2 (ref 60–?)
GLOBULIN PLAS-MCNC: 3.4 G/DL (ref 2.8–4.4)
GLUCOSE BLD-MCNC: 118 MG/DL (ref 70–99)
HCT VFR BLD AUTO: 33.2 %
HGB BLD-MCNC: 10.6 G/DL
IMM GRANULOCYTES # BLD AUTO: 0.04 X10(3) UL (ref 0–1)
IMM GRANULOCYTES NFR BLD: 0.8 %
LYMPHOCYTES # BLD AUTO: 1.04 X10(3) UL (ref 1–4)
LYMPHOCYTES NFR BLD AUTO: 21.7 %
MCH RBC QN AUTO: 31.6 PG (ref 26–34)
MCHC RBC AUTO-ENTMCNC: 31.9 G/DL (ref 31–37)
MCV RBC AUTO: 99.1 FL
MONOCYTES # BLD AUTO: 0.63 X10(3) UL (ref 0.1–1)
MONOCYTES NFR BLD AUTO: 13.1 %
NEUTROPHILS # BLD AUTO: 3.05 X10 (3) UL (ref 1.5–7.7)
NEUTROPHILS # BLD AUTO: 3.05 X10(3) UL (ref 1.5–7.7)
NEUTROPHILS NFR BLD AUTO: 63.6 %
OSMOLALITY SERPL CALC.SUM OF ELEC: 289 MOSM/KG (ref 275–295)
PLATELET # BLD AUTO: 179 10(3)UL (ref 150–450)
POTASSIUM SERPL-SCNC: 3.5 MMOL/L (ref 3.5–5.1)
PROT SERPL-MCNC: 7 G/DL (ref 6.4–8.2)
RBC # BLD AUTO: 3.35 X10(6)UL
SODIUM SERPL-SCNC: 139 MMOL/L (ref 136–145)
WBC # BLD AUTO: 4.8 X10(3) UL (ref 4–11)

## 2023-06-15 PROCEDURE — 99215 OFFICE O/P EST HI 40 MIN: CPT | Performed by: STUDENT IN AN ORGANIZED HEALTH CARE EDUCATION/TRAINING PROGRAM

## 2023-06-15 PROCEDURE — 96375 TX/PRO/DX INJ NEW DRUG ADDON: CPT

## 2023-06-15 PROCEDURE — 85025 COMPLETE CBC W/AUTO DIFF WBC: CPT

## 2023-06-15 PROCEDURE — 80053 COMPREHEN METABOLIC PANEL: CPT

## 2023-06-15 PROCEDURE — 96413 CHEMO IV INFUSION 1 HR: CPT

## 2023-06-15 PROCEDURE — 96417 CHEMO IV INFUS EACH ADDL SEQ: CPT

## 2023-06-15 PROCEDURE — 86301 IMMUNOASSAY TUMOR CA 19-9: CPT

## 2023-06-15 RX ORDER — HEPARIN SODIUM (PORCINE) LOCK FLUSH IV SOLN 100 UNIT/ML 100 UNIT/ML
5 SOLUTION INTRAVENOUS ONCE
OUTPATIENT
Start: 2023-06-15

## 2023-06-15 RX ORDER — HEPARIN SODIUM (PORCINE) LOCK FLUSH IV SOLN 100 UNIT/ML 100 UNIT/ML
5 SOLUTION INTRAVENOUS ONCE
Status: COMPLETED | OUTPATIENT
Start: 2023-06-15 | End: 2023-06-15

## 2023-06-15 RX ORDER — SODIUM CHLORIDE 9 MG/ML
10 INJECTION INTRAVENOUS ONCE
OUTPATIENT
Start: 2023-06-15

## 2023-06-15 RX ADMIN — HEPARIN SODIUM (PORCINE) LOCK FLUSH IV SOLN 100 UNIT/ML 500 UNITS: 100 SOLUTION INTRAVENOUS at 16:58:00

## 2023-06-15 NOTE — PROGRESS NOTES
Pt here for C6D1 Abraxane/Gemzar. Arrives Ambulating independently, accompanied by Self from MD visit. Oral medications included in this regimen:  no    Patient confirms comprehension of cancer treatment schedule:  yes    Pregnancy screening:  Not applicable    Modifications in dose or schedule:  Yes - pt now going to q3week dosing, appointments adjusted accordingly    Medications appearance and physical integrity checked by RN.  Chemotherapy IV pump settings verified by 2 RNs:  yes     Frequency of blood return and site check throughout administration: Prior to administration, Prior to each drug and At completion of therapy     Infusion/treatment outcome:  patient tolerated treatment without incident    Education Record    Learner:  Patient  Barriers / Limitations:  None  Method:  Reinforcement  Education / instructions given: Reinforced change in treatment schedule  Outcome:  Shows understanding    Discharged Home, Ambulating independently, accompanied by:Self    Patient/family verbalized understanding of future appointments: by printed AVS

## 2023-06-22 ENCOUNTER — APPOINTMENT (OUTPATIENT)
Dept: HEMATOLOGY/ONCOLOGY | Facility: HOSPITAL | Age: 78
End: 2023-06-22
Attending: STUDENT IN AN ORGANIZED HEALTH CARE EDUCATION/TRAINING PROGRAM
Payer: MEDICARE

## 2023-07-06 ENCOUNTER — OFFICE VISIT (OUTPATIENT)
Dept: HEMATOLOGY/ONCOLOGY | Facility: HOSPITAL | Age: 78
End: 2023-07-06
Attending: PHYSICIAN ASSISTANT
Payer: MEDICARE

## 2023-07-06 ENCOUNTER — NURSE ONLY (OUTPATIENT)
Dept: HEMATOLOGY/ONCOLOGY | Facility: HOSPITAL | Age: 78
End: 2023-07-06
Attending: STUDENT IN AN ORGANIZED HEALTH CARE EDUCATION/TRAINING PROGRAM
Payer: MEDICARE

## 2023-07-06 VITALS
RESPIRATION RATE: 18 BRPM | HEIGHT: 65 IN | TEMPERATURE: 98 F | WEIGHT: 136.81 LBS | HEART RATE: 84 BPM | DIASTOLIC BLOOD PRESSURE: 93 MMHG | OXYGEN SATURATION: 98 % | BODY MASS INDEX: 22.8 KG/M2 | SYSTOLIC BLOOD PRESSURE: 161 MMHG

## 2023-07-06 DIAGNOSIS — R63.4 WEIGHT LOSS: ICD-10-CM

## 2023-07-06 DIAGNOSIS — R30.0 DYSURIA: ICD-10-CM

## 2023-07-06 DIAGNOSIS — Z51.11 CHEMOTHERAPY MANAGEMENT, ENCOUNTER FOR: ICD-10-CM

## 2023-07-06 DIAGNOSIS — R53.1 GENERALIZED WEAKNESS: ICD-10-CM

## 2023-07-06 DIAGNOSIS — C25.9 ADENOCARCINOMA OF PANCREAS (HCC): Primary | ICD-10-CM

## 2023-07-06 DIAGNOSIS — E87.6 HYPOKALEMIA DUE TO INADEQUATE POTASSIUM INTAKE: ICD-10-CM

## 2023-07-06 LAB
ALBUMIN SERPL-MCNC: 3.8 G/DL (ref 3.4–5)
ALBUMIN/GLOB SERPL: 1.2 {RATIO} (ref 1–2)
ALP LIVER SERPL-CCNC: 89 U/L
ALT SERPL-CCNC: 20 U/L
ANION GAP SERPL CALC-SCNC: 10 MMOL/L (ref 0–18)
AST SERPL-CCNC: 30 U/L (ref 15–37)
BASOPHILS # BLD AUTO: 0.02 X10(3) UL (ref 0–0.2)
BASOPHILS NFR BLD AUTO: 0.4 %
BILIRUB SERPL-MCNC: 0.8 MG/DL (ref 0.1–2)
BILIRUB UR QL: NEGATIVE
BUN BLD-MCNC: 19 MG/DL (ref 7–18)
BUN/CREAT SERPL: 16.7 (ref 10–20)
CALCIUM BLD-MCNC: 9.3 MG/DL (ref 8.5–10.1)
CANCER AG19-9 SERPL-ACNC: 3.4 U/ML (ref ?–37)
CHLORIDE SERPL-SCNC: 108 MMOL/L (ref 98–112)
CO2 SERPL-SCNC: 23 MMOL/L (ref 21–32)
COLOR UR: YELLOW
CREAT BLD-MCNC: 1.14 MG/DL
DEPRECATED RDW RBC AUTO: 55.8 FL (ref 35.1–46.3)
EOSINOPHIL # BLD AUTO: 0.01 X10(3) UL (ref 0–0.7)
EOSINOPHIL NFR BLD AUTO: 0.2 %
ERYTHROCYTE [DISTWIDTH] IN BLOOD BY AUTOMATED COUNT: 15.6 % (ref 11–15)
GFR SERPLBLD BASED ON 1.73 SQ M-ARVRAT: 49 ML/MIN/1.73M2 (ref 60–?)
GLOBULIN PLAS-MCNC: 3.3 G/DL (ref 2.8–4.4)
GLUCOSE BLD-MCNC: 116 MG/DL (ref 70–99)
GLUCOSE UR-MCNC: NORMAL MG/DL
HCT VFR BLD AUTO: 34 %
HGB BLD-MCNC: 10.9 G/DL
IMM GRANULOCYTES # BLD AUTO: 0.03 X10(3) UL (ref 0–1)
IMM GRANULOCYTES NFR BLD: 0.7 %
KETONES UR-MCNC: NEGATIVE MG/DL
LEUKOCYTE ESTERASE UR QL STRIP.AUTO: 500
LYMPHOCYTES # BLD AUTO: 0.97 X10(3) UL (ref 1–4)
LYMPHOCYTES NFR BLD AUTO: 21.7 %
MCH RBC QN AUTO: 31.2 PG (ref 26–34)
MCHC RBC AUTO-ENTMCNC: 32.1 G/DL (ref 31–37)
MCV RBC AUTO: 97.4 FL
MONOCYTES # BLD AUTO: 0.53 X10(3) UL (ref 0.1–1)
MONOCYTES NFR BLD AUTO: 11.9 %
NEUTROPHILS # BLD AUTO: 2.91 X10 (3) UL (ref 1.5–7.7)
NEUTROPHILS # BLD AUTO: 2.91 X10(3) UL (ref 1.5–7.7)
NEUTROPHILS NFR BLD AUTO: 65.1 %
OSMOLALITY SERPL CALC.SUM OF ELEC: 295 MOSM/KG (ref 275–295)
PH UR: 6 [PH] (ref 5–8)
PLATELET # BLD AUTO: 144 10(3)UL (ref 150–450)
POTASSIUM SERPL-SCNC: 3.4 MMOL/L (ref 3.5–5.1)
PROT SERPL-MCNC: 7.1 G/DL (ref 6.4–8.2)
PROT UR-MCNC: 70 MG/DL
RBC # BLD AUTO: 3.49 X10(6)UL
RBC #/AREA URNS AUTO: >10 /HPF
SODIUM SERPL-SCNC: 141 MMOL/L (ref 136–145)
SP GR UR STRIP: 1.01 (ref 1–1.03)
UROBILINOGEN UR STRIP-ACNC: NORMAL
WBC # BLD AUTO: 4.5 X10(3) UL (ref 4–11)
WBC #/AREA URNS AUTO: >50 /HPF
WBC CLUMPS UR QL AUTO: PRESENT /HPF

## 2023-07-06 PROCEDURE — 80053 COMPREHEN METABOLIC PANEL: CPT

## 2023-07-06 PROCEDURE — 87077 CULTURE AEROBIC IDENTIFY: CPT

## 2023-07-06 PROCEDURE — 87086 URINE CULTURE/COLONY COUNT: CPT

## 2023-07-06 PROCEDURE — 96413 CHEMO IV INFUSION 1 HR: CPT

## 2023-07-06 PROCEDURE — 81001 URINALYSIS AUTO W/SCOPE: CPT

## 2023-07-06 PROCEDURE — 87186 SC STD MICRODIL/AGAR DIL: CPT

## 2023-07-06 PROCEDURE — 96417 CHEMO IV INFUS EACH ADDL SEQ: CPT

## 2023-07-06 PROCEDURE — 96375 TX/PRO/DX INJ NEW DRUG ADDON: CPT

## 2023-07-06 PROCEDURE — 85025 COMPLETE CBC W/AUTO DIFF WBC: CPT

## 2023-07-06 PROCEDURE — 99215 OFFICE O/P EST HI 40 MIN: CPT | Performed by: PHYSICIAN ASSISTANT

## 2023-07-06 PROCEDURE — 86301 IMMUNOASSAY TUMOR CA 19-9: CPT

## 2023-07-06 RX ORDER — LANSOPRAZOLE 30 MG/1
30 CAPSULE, DELAYED RELEASE ORAL
Qty: 90 CAPSULE | Refills: 0 | Status: SHIPPED | OUTPATIENT
Start: 2023-07-06

## 2023-07-06 RX ORDER — HEPARIN SODIUM (PORCINE) LOCK FLUSH IV SOLN 100 UNIT/ML 100 UNIT/ML
5 SOLUTION INTRAVENOUS ONCE
OUTPATIENT
Start: 2023-07-06

## 2023-07-06 RX ORDER — SODIUM CHLORIDE 9 MG/ML
10 INJECTION INTRAVENOUS ONCE
OUTPATIENT
Start: 2023-07-06

## 2023-07-06 RX ORDER — HEPARIN SODIUM (PORCINE) LOCK FLUSH IV SOLN 100 UNIT/ML 100 UNIT/ML
5 SOLUTION INTRAVENOUS ONCE
Status: COMPLETED | OUTPATIENT
Start: 2023-07-06 | End: 2023-07-06

## 2023-07-06 RX ORDER — NITROFURANTOIN 25; 75 MG/1; MG/1
100 CAPSULE ORAL 2 TIMES DAILY
Qty: 14 CAPSULE | Refills: 0 | Status: SHIPPED | OUTPATIENT
Start: 2023-07-06 | End: 2023-07-13

## 2023-07-06 RX ADMIN — HEPARIN SODIUM (PORCINE) LOCK FLUSH IV SOLN 100 UNIT/ML 500 UNITS: 100 SOLUTION INTRAVENOUS at 16:10:00

## 2023-07-06 NOTE — PROGRESS NOTES
Pt here for C7D1 Abraxane/Gemzar. Arrives Ambulating independently, accompanied by Self. PAC previously accessed in lab. Lab results appropriate to proceed with treatment. Patient reports dysuria during visit with ANNIE Araujo collected and sent to lab. Oral medications included in this regimen:  no    Patient confirms comprehension of cancer treatment schedule:  yes    Pregnancy screening:  Not applicable    Modifications in dose or schedule:  No    Medications appearance and physical integrity checked by RN.  Chemotherapy IV pump settings verified by 2 RNs:  yes     Frequency of blood return and site check throughout administration: Prior to administration, Prior to each drug, and At completion of therapy     Infusion/treatment outcome:  patient tolerated treatment without incident    Education Record    Learner:  Patient  Barriers / Limitations:  None  Method:  Reinforcement  Education / instructions given:  Reviewed plan of care and reinforced med education as needed  Outcome:  Shows understanding    Discharged Home, Ambulating independently, accompanied by:Self    Patient/family verbalized understanding of future appointments: by printed AVS

## 2023-07-06 NOTE — PROGRESS NOTES
[facility]  Oncology Progress Note    Patient Name: Peter Delacruz   YOB: 1945   Medical Record Number: [id number]    Chief Complaint: metastatic pancreatic cancer    Cancer Staging  Adenocarcinoma of pancreas [facility])  Staging form: Exocrine Pancreas, AJCC 8th Edition  - Clinical stage from 1/25/2023: Stage IV (cTX, cNX, pM1) - Signed by Georgette Cuevas DO on 1/30/2023    Oncology History Overview Note   Diagnosis: Metastatic MSI-S adenocarcinoma of the pancreas  Date of Dx: 1/25/23  Molecular: MSI-S,   -somatic KRAS p.G12R, TP53, SMAD4  -germline TERT heterozygous VUS  Treatments:  -Gemcitabine, Abraxane    1/17/23: Initial medical oncology appointment. Briefly, the patient developed progressive left-sided abdominal pain in late November and was referred to gastroenterology (Dr. Renzo Friedman) who pursued imaging with a CT a/p noncontrast which revealed multiple hypoattenuating hepatic lesions, a solid and cystic pancreatic tail mass extending to the splenic hilum and splenomegaly with splenic vein occlusion. Referred to medical oncology for further care. Discussed concern for pancreatic cancer, plan for CT c/a/p with contrast and IR biopsy. 1/21/23: CT c/a/p pancreatic tail mass with splenic vein occlusion, multiple liver metastases, bilateral lung nodules concerning for metastases and left upper quadrant omental nodularity suspicious for early carcinomatosis. 1/25/23: IR liver biopsy, PATH: MSI-S moderately differentiated adenocarcinoma of the pancreas. The tumor cells are positive for keratin AE1/AE3, CK7, CK20, , and CDX2, but are negative for GATA3 and TTF-1.    1/30/23: med onc followup. Discussed metastatic nature of disease, prognosis, palliative intent of therapy. Pt fit for gemcitabine/abraxane. Will start with dose-reduction given goals of care.   -2/6, port placement    2/9/23: C1D1 [place]/Abraxane  -2/16, tolerating well, cleared for D8, RTC 2 weeks.  Treated for strep pharyngitis. 3/23/23: tolerating well, cleared for C#3.     5/4/23: restaging CT shows partial response to treatment. Pt feeling weaker from chemo, continues to lose weight. Hold tx 1 week, reassess. 5/11/23: pt feeling stronger, gained 2-3 lbs. Will restart dose-reduced gem/abraxane, plan for chemo q2 weeks. 6/15/23: pt opted for q3 week dosing of [place]/Abraxane. Does very well during week 3. No pain, labs and PS adequate to continue therapy, continue q3 week dosing. Adenocarcinoma of pancreas ([facility] [address]   1/25/2023 Cancer Staged    Staging form: Exocrine Pancreas, AJCC 8th Edition  - Clinical stage from 1/25/2023: Stage IV (cTX, cNX, pM1)     1/30/2023 Initial Diagnosis    Adenocarcinoma of pancreas (HCC)     2/9/2023 -  Chemotherapy    OP Albumin-bound PACLitaxel / Gemcitabine  Plan Provider: Job Holstein,   Treatment goal: Palliative  Line of treatment: [No plan line of treatment]       Subjective: Gerald King is a 65year old female with a hx of HTN, asthma, and fibromyalgia who presents to medical oncology regarding metastatic MSI-S adenocarcinoma of the pancreas on frontline gemcitabine Abraxane as detailed above restaging after 4 cycles showed a partial response to therapy. However, she has begun to develop cumulative toxicities, mainly fatigue and poor appetite so we are now dosing gemcitabine Abraxane every 3 weeks. She presents for clearance of her next cycle. Overall Elan Hernandez is doing fairly well today. She is tolerating tx fairly well. Fatigue is improved. She is c/o burning sensation while urinating x 1 week. Keeping herself hydrated. Urinating q 2-3 hrs. No hematuria. No n/v/d/c.mild neuropathy to fingertips/toes - no worsening of sxs. No interference w/ ADLs. Bp elevated - feels anxious coming to appts.  Takes bp at home and has been nl     Review of Systems:  Hematology/Oncology ROS performed and negative except as above in HPI    History/Other:   Current Medications:  No outpatient medications have been marked as taking for the 7/6/23 encounter (Appointment) with Christian Walsh PA-C. Allergies:   Ciprofloxacin           JITTERY    Comment:Also got a headache  Cortisone                   Comment:Other reaction(s): Rash    Objective: There were no vitals taken for this visit. Physical Exam:  ECOG PS: 1  General: A&Ox3, NAD  HEENT: PERRL, OP clear  CV: RRR  Pulm: normal effort  Abd: soft, ntnd  Extremities: no edema  Neurological: grossly intact    Results:   Labs:  Lab Results   Component Value Date    WBC 4.8 06/15/2023    HGB 10.6 (L) 06/15/2023    HCT 33.2 (L) 06/15/2023    .0 06/15/2023    CREATSERUM 1.17 (H) 06/15/2023    BUN 13 06/15/2023     06/15/2023    K 3.5 06/15/2023     06/15/2023    CO2 24.0 06/15/2023     (H) 06/15/2023    CA 9.4 06/15/2023    ALB 3.6 06/15/2023    ALKPHO 86 06/15/2023    BILT 0.6 06/15/2023    TP 7.0 06/15/2023    AST 27 06/15/2023    ALT 19 06/15/2023    INR 1.19 (H) 01/17/2023    TSH 1.18 08/10/2015    CRP <0.5 07/03/2016     Imaging:      Pathology:  Liver; core biopsy: Moderately differentiated adenocarcinoma (see comment). MSI stable. Comment:  Patient's recent CT of the chest, abdomen, and pelvis (1/21/23) is reported to demonstrate a 4.5 cm pancreatic tail mass which occludes the left splenic vein, multiple liver metastases, bilateral lung nodules, and left upper quadrant omental nodularity. Sections of the liver biopsy demonstrate extensive involvement by moderately differentiated adenocarcinoma. The tumor cells are positive for keratin AE1/AE3, CK7, CK20, , and CDX2, but are negative for GATA3 and TTF-1. Assessment & Plan:   Renea Deal is a 65year old female with a hx of HTN, asthma, and fibromyalgia who presents for medical oncology follow-up regarding metastatic MSI-S adenocarcinoma of the pancreas on frontline gemcitabine and abraxane.      # MSI-S moderately differentiated adenocarcinoma of the pancreas. -previously reviewed biopsy and imaging, discussed natural history of adenocarcinoma of the pancreas, prognosis, incurable nature of disease and palliative intent of therapy  -began FDR [place] and dose-reduced Abraxane D1/8 q21 days on 2/9/23  -restaging after 4 cycles shows a partial response, though toxicity from chemotherapy compiling  -Continue gem Abraxane, now administered every 3 weeks  -Cleared for C7 today, tolerating 3-week regimen fairly well, tentatively plan for restaging in late July- complete prior to next appt. #dysuria   - check UA today. Fu with results  - start macrobid bid x 7 days. Push po fluids. Report fevers/flank pain    # Supportive Care, pain management   -moderate cancer related pain currently improved and patient no longer having pain  -anxiety, Lexapro 5mg daily, pt also has ativan 0.5mg q12hr prn  - wt loss -monitor wt#. Small freq meals encouraged. Supplement w/ protein drinks. - G1 neuropathy - sxs stable     # Comorbidities. -HTN, losartan  -asthma, albuterol, fluticasone  -HLD, simvastatin  -GERD, lansoprazole    # Emotional well being  -discussed today, the patient is aware of support systems available through the [facility], currently no concerns or issues.  The diagnosis, prognosis, treatment goals, plan for treatment, and anticipated response were explained to the patient.   -Advanced Care Planning: not discussed today    MDM High: malignancy; review of results with independent interpretation and discussion of management; drug therapy requiring intensive monitoring for toxicity    Zurdo Ruiz PA-C

## 2023-07-25 ENCOUNTER — HOSPITAL ENCOUNTER (OUTPATIENT)
Dept: CT IMAGING | Facility: HOSPITAL | Age: 78
Discharge: HOME | End: 2023-07-25
Attending: PHYSICIAN ASSISTANT
Payer: MEDICARE

## 2023-07-25 DIAGNOSIS — C25.9 ADENOCARCINOMA OF PANCREAS (HCC): ICD-10-CM

## 2023-07-25 PROCEDURE — 71260 CT THORAX DX C+: CPT | Performed by: PHYSICIAN ASSISTANT

## 2023-07-25 PROCEDURE — 74178 CT ABD&PLV WO CNTR FLWD CNTR: CPT | Performed by: PHYSICIAN ASSISTANT

## 2023-07-27 ENCOUNTER — NURSE ONLY (OUTPATIENT)
Dept: HEMATOLOGY/ONCOLOGY | Facility: HOSPITAL | Age: 78
End: 2023-07-27
Attending: STUDENT IN AN ORGANIZED HEALTH CARE EDUCATION/TRAINING PROGRAM
Payer: MEDICARE

## 2023-07-27 VITALS
TEMPERATURE: 98 F | DIASTOLIC BLOOD PRESSURE: 70 MMHG | HEIGHT: 65 IN | OXYGEN SATURATION: 98 % | WEIGHT: 133.81 LBS | RESPIRATION RATE: 16 BRPM | BODY MASS INDEX: 22.3 KG/M2 | HEART RATE: 87 BPM | SYSTOLIC BLOOD PRESSURE: 130 MMHG

## 2023-07-27 DIAGNOSIS — T45.1X5A CHEMOTHERAPY-INDUCED NAUSEA: ICD-10-CM

## 2023-07-27 DIAGNOSIS — R30.0 DYSURIA: ICD-10-CM

## 2023-07-27 DIAGNOSIS — R63.4 WEIGHT LOSS: ICD-10-CM

## 2023-07-27 DIAGNOSIS — Z51.11 CHEMOTHERAPY MANAGEMENT, ENCOUNTER FOR: ICD-10-CM

## 2023-07-27 DIAGNOSIS — C25.9 ADENOCARCINOMA OF PANCREAS (HCC): Primary | ICD-10-CM

## 2023-07-27 DIAGNOSIS — R53.1 GENERALIZED WEAKNESS: ICD-10-CM

## 2023-07-27 DIAGNOSIS — R11.0 CHEMOTHERAPY-INDUCED NAUSEA: ICD-10-CM

## 2023-07-27 DIAGNOSIS — E87.6 HYPOKALEMIA DUE TO INADEQUATE POTASSIUM INTAKE: ICD-10-CM

## 2023-07-27 LAB
ALBUMIN SERPL-MCNC: 3.5 G/DL (ref 3.4–5)
ALBUMIN/GLOB SERPL: 1.2 {RATIO} (ref 1–2)
ALP LIVER SERPL-CCNC: 82 U/L
ALT SERPL-CCNC: 17 U/L
ANION GAP SERPL CALC-SCNC: 7 MMOL/L (ref 0–18)
AST SERPL-CCNC: 20 U/L (ref 15–37)
BASOPHILS # BLD AUTO: 0.01 X10(3) UL (ref 0–0.2)
BASOPHILS NFR BLD AUTO: 0.3 %
BILIRUB SERPL-MCNC: 0.7 MG/DL (ref 0.1–2)
BUN BLD-MCNC: 20 MG/DL (ref 7–18)
BUN/CREAT SERPL: 17.7 (ref 10–20)
CALCIUM BLD-MCNC: 9.2 MG/DL (ref 8.5–10.1)
CANCER AG19-9 SERPL-ACNC: 2.2 U/ML (ref ?–37)
CHLORIDE SERPL-SCNC: 106 MMOL/L (ref 98–112)
CO2 SERPL-SCNC: 25 MMOL/L (ref 21–32)
CREAT BLD-MCNC: 1.13 MG/DL
DEPRECATED RDW RBC AUTO: 53.5 FL (ref 35.1–46.3)
EGFRCR SERPLBLD CKD-EPI 2021: 50 ML/MIN/1.73M2 (ref 60–?)
EOSINOPHIL # BLD AUTO: 0.02 X10(3) UL (ref 0–0.7)
EOSINOPHIL NFR BLD AUTO: 0.6 %
ERYTHROCYTE [DISTWIDTH] IN BLOOD BY AUTOMATED COUNT: 15.2 % (ref 11–15)
GLOBULIN PLAS-MCNC: 3 G/DL (ref 2.8–4.4)
GLUCOSE BLD-MCNC: 98 MG/DL (ref 70–99)
HCT VFR BLD AUTO: 33.6 %
HGB BLD-MCNC: 11 G/DL
IMM GRANULOCYTES # BLD AUTO: 0.03 X10(3) UL (ref 0–1)
IMM GRANULOCYTES NFR BLD: 0.9 %
LYMPHOCYTES # BLD AUTO: 0.74 X10(3) UL (ref 1–4)
LYMPHOCYTES NFR BLD AUTO: 22.3 %
MCH RBC QN AUTO: 31.4 PG (ref 26–34)
MCHC RBC AUTO-ENTMCNC: 32.7 G/DL (ref 31–37)
MCV RBC AUTO: 96 FL
MONOCYTES # BLD AUTO: 0.42 X10(3) UL (ref 0.1–1)
MONOCYTES NFR BLD AUTO: 12.7 %
NEUTROPHILS # BLD AUTO: 2.1 X10 (3) UL (ref 1.5–7.7)
NEUTROPHILS # BLD AUTO: 2.1 X10(3) UL (ref 1.5–7.7)
NEUTROPHILS NFR BLD AUTO: 63.2 %
OSMOLALITY SERPL CALC.SUM OF ELEC: 289 MOSM/KG (ref 275–295)
PLATELET # BLD AUTO: 139 10(3)UL (ref 150–450)
POTASSIUM SERPL-SCNC: 3.5 MMOL/L (ref 3.5–5.1)
PROT SERPL-MCNC: 6.5 G/DL (ref 6.4–8.2)
RBC # BLD AUTO: 3.5 X10(6)UL
SODIUM SERPL-SCNC: 138 MMOL/L (ref 136–145)
WBC # BLD AUTO: 3.3 X10(3) UL (ref 4–11)

## 2023-07-27 PROCEDURE — 85025 COMPLETE CBC W/AUTO DIFF WBC: CPT

## 2023-07-27 PROCEDURE — 96413 CHEMO IV INFUSION 1 HR: CPT

## 2023-07-27 PROCEDURE — 86301 IMMUNOASSAY TUMOR CA 19-9: CPT

## 2023-07-27 PROCEDURE — 80053 COMPREHEN METABOLIC PANEL: CPT

## 2023-07-27 PROCEDURE — 96417 CHEMO IV INFUS EACH ADDL SEQ: CPT

## 2023-07-27 PROCEDURE — 99215 OFFICE O/P EST HI 40 MIN: CPT | Performed by: STUDENT IN AN ORGANIZED HEALTH CARE EDUCATION/TRAINING PROGRAM

## 2023-07-27 PROCEDURE — 96375 TX/PRO/DX INJ NEW DRUG ADDON: CPT

## 2023-07-27 RX ORDER — SODIUM CHLORIDE 9 MG/ML
10 INJECTION INTRAVENOUS ONCE
OUTPATIENT
Start: 2023-07-27

## 2023-07-27 RX ORDER — HEPARIN SODIUM (PORCINE) LOCK FLUSH IV SOLN 100 UNIT/ML 100 UNIT/ML
5 SOLUTION INTRAVENOUS ONCE
Status: COMPLETED | OUTPATIENT
Start: 2023-07-27 | End: 2023-07-27

## 2023-07-27 RX ORDER — HEPARIN SODIUM (PORCINE) LOCK FLUSH IV SOLN 100 UNIT/ML 100 UNIT/ML
5 SOLUTION INTRAVENOUS ONCE
OUTPATIENT
Start: 2023-07-27

## 2023-07-27 RX ADMIN — HEPARIN SODIUM (PORCINE) LOCK FLUSH IV SOLN 100 UNIT/ML 500 UNITS: 100 SOLUTION INTRAVENOUS at 16:58:00

## 2023-07-27 NOTE — PROGRESS NOTES
Pt here for C8D1 Abraxane/Gemzar. Arrives Ambulating independently, accompanied by Self. PAC previously accessed in lab. Lab results appropriate to proceed with treatment today. Oral medications included in this regimen:  no    Patient confirms comprehension of cancer treatment schedule:  yes    Pregnancy screening:  Not applicable    Modifications in dose or schedule:  No    Medications appearance and physical integrity checked by RN.  Chemotherapy IV pump settings verified by 2 RNs:  yes     Frequency of blood return and site check throughout administration: Prior to administration, Prior to each drug, and At completion of therapy     Infusion/treatment outcome:  patient tolerated treatment without incident    Education Record    Learner:  Patient  Barriers / Limitations:  None  Method:  Reinforcement  Education / instructions given:  Reinforced treatment schedule  Outcome:  Shows understanding    Discharged Home, Ambulating independently, accompanied by:Self    Patient/family verbalized understanding of future appointments: by printed AVS

## 2023-08-14 RX ORDER — LOSARTAN POTASSIUM 50 MG/1
50 TABLET ORAL DAILY
Qty: 90 TABLET | Refills: 3 | Status: SHIPPED | OUTPATIENT
Start: 2023-08-14

## 2023-08-17 ENCOUNTER — OFFICE VISIT (OUTPATIENT)
Dept: HEMATOLOGY/ONCOLOGY | Facility: HOSPITAL | Age: 78
End: 2023-08-17
Attending: PHYSICIAN ASSISTANT
Payer: MEDICARE

## 2023-08-17 ENCOUNTER — NURSE ONLY (OUTPATIENT)
Dept: HEMATOLOGY/ONCOLOGY | Facility: HOSPITAL | Age: 78
End: 2023-08-17
Attending: STUDENT IN AN ORGANIZED HEALTH CARE EDUCATION/TRAINING PROGRAM
Payer: MEDICARE

## 2023-08-17 VITALS
OXYGEN SATURATION: 98 % | SYSTOLIC BLOOD PRESSURE: 145 MMHG | DIASTOLIC BLOOD PRESSURE: 76 MMHG | BODY MASS INDEX: 21.83 KG/M2 | HEIGHT: 65 IN | HEART RATE: 85 BPM | WEIGHT: 131 LBS | RESPIRATION RATE: 16 BRPM | TEMPERATURE: 98 F

## 2023-08-17 DIAGNOSIS — T45.1X5A PERIPHERAL NEUROPATHY DUE TO CHEMOTHERAPY: ICD-10-CM

## 2023-08-17 DIAGNOSIS — Z51.11 CHEMOTHERAPY MANAGEMENT, ENCOUNTER FOR: ICD-10-CM

## 2023-08-17 DIAGNOSIS — C25.9 ADENOCARCINOMA OF PANCREAS (HCC): Primary | ICD-10-CM

## 2023-08-17 DIAGNOSIS — R53.1 GENERALIZED WEAKNESS: ICD-10-CM

## 2023-08-17 DIAGNOSIS — E87.6 HYPOKALEMIA DUE TO INADEQUATE POTASSIUM INTAKE: ICD-10-CM

## 2023-08-17 DIAGNOSIS — G62.0 PERIPHERAL NEUROPATHY DUE TO CHEMOTHERAPY: ICD-10-CM

## 2023-08-17 LAB
ALBUMIN SERPL-MCNC: 3.6 G/DL (ref 3.4–5)
ALBUMIN/GLOB SERPL: 1.1 {RATIO} (ref 1–2)
ALP LIVER SERPL-CCNC: 96 U/L
ALT SERPL-CCNC: 16 U/L
ANION GAP SERPL CALC-SCNC: 9 MMOL/L (ref 0–18)
AST SERPL-CCNC: 18 U/L (ref 15–37)
BASOPHILS # BLD AUTO: 0.02 X10(3) UL (ref 0–0.2)
BASOPHILS NFR BLD AUTO: 0.6 %
BILIRUB SERPL-MCNC: 0.7 MG/DL (ref 0.1–2)
BUN BLD-MCNC: 22 MG/DL (ref 7–18)
BUN/CREAT SERPL: 17.5 (ref 10–20)
CALCIUM BLD-MCNC: 9.4 MG/DL (ref 8.5–10.1)
CANCER AG19-9 SERPL-ACNC: 6 U/ML (ref ?–37)
CHLORIDE SERPL-SCNC: 110 MMOL/L (ref 98–112)
CO2 SERPL-SCNC: 24 MMOL/L (ref 21–32)
CREAT BLD-MCNC: 1.26 MG/DL
DEPRECATED RDW RBC AUTO: 52 FL (ref 35.1–46.3)
EGFRCR SERPLBLD CKD-EPI 2021: 44 ML/MIN/1.73M2 (ref 60–?)
EOSINOPHIL # BLD AUTO: 0.03 X10(3) UL (ref 0–0.7)
EOSINOPHIL NFR BLD AUTO: 0.8 %
ERYTHROCYTE [DISTWIDTH] IN BLOOD BY AUTOMATED COUNT: 14.9 % (ref 11–15)
GLOBULIN PLAS-MCNC: 3.3 G/DL (ref 2.8–4.4)
GLUCOSE BLD-MCNC: 119 MG/DL (ref 70–99)
HCT VFR BLD AUTO: 34 %
HGB BLD-MCNC: 10.9 G/DL
IMM GRANULOCYTES # BLD AUTO: 0.03 X10(3) UL (ref 0–1)
IMM GRANULOCYTES NFR BLD: 0.8 %
LYMPHOCYTES # BLD AUTO: 0.9 X10(3) UL (ref 1–4)
LYMPHOCYTES NFR BLD AUTO: 25.1 %
MCH RBC QN AUTO: 30.6 PG (ref 26–34)
MCHC RBC AUTO-ENTMCNC: 32.1 G/DL (ref 31–37)
MCV RBC AUTO: 95.5 FL
MONOCYTES # BLD AUTO: 0.49 X10(3) UL (ref 0.1–1)
MONOCYTES NFR BLD AUTO: 13.6 %
NEUTROPHILS # BLD AUTO: 2.12 X10 (3) UL (ref 1.5–7.7)
NEUTROPHILS # BLD AUTO: 2.12 X10(3) UL (ref 1.5–7.7)
NEUTROPHILS NFR BLD AUTO: 59.1 %
OSMOLALITY SERPL CALC.SUM OF ELEC: 300 MOSM/KG (ref 275–295)
PLATELET # BLD AUTO: 144 10(3)UL (ref 150–450)
POTASSIUM SERPL-SCNC: 3.3 MMOL/L (ref 3.5–5.1)
PROT SERPL-MCNC: 6.9 G/DL (ref 6.4–8.2)
RBC # BLD AUTO: 3.56 X10(6)UL
SODIUM SERPL-SCNC: 143 MMOL/L (ref 136–145)
WBC # BLD AUTO: 3.6 X10(3) UL (ref 4–11)

## 2023-08-17 PROCEDURE — 99215 OFFICE O/P EST HI 40 MIN: CPT | Performed by: PHYSICIAN ASSISTANT

## 2023-08-17 PROCEDURE — 85025 COMPLETE CBC W/AUTO DIFF WBC: CPT

## 2023-08-17 PROCEDURE — 96375 TX/PRO/DX INJ NEW DRUG ADDON: CPT

## 2023-08-17 PROCEDURE — 96417 CHEMO IV INFUS EACH ADDL SEQ: CPT

## 2023-08-17 PROCEDURE — 86301 IMMUNOASSAY TUMOR CA 19-9: CPT

## 2023-08-17 PROCEDURE — 96413 CHEMO IV INFUSION 1 HR: CPT

## 2023-08-17 PROCEDURE — 80053 COMPREHEN METABOLIC PANEL: CPT

## 2023-08-17 RX ORDER — SODIUM CHLORIDE 9 MG/ML
10 INJECTION INTRAVENOUS ONCE
Status: DISCONTINUED | OUTPATIENT
Start: 2023-08-17 | End: 2023-08-17

## 2023-08-17 RX ORDER — SODIUM CHLORIDE 9 MG/ML
10 INJECTION INTRAVENOUS ONCE
OUTPATIENT
Start: 2023-08-17

## 2023-08-17 RX ORDER — POTASSIUM CHLORIDE 750 MG/1
20 TABLET, FILM COATED, EXTENDED RELEASE ORAL DAILY
Qty: 60 TABLET | Refills: 0 | Status: SHIPPED | OUTPATIENT
Start: 2023-08-17

## 2023-08-17 RX ORDER — HEPARIN SODIUM (PORCINE) LOCK FLUSH IV SOLN 100 UNIT/ML 100 UNIT/ML
5 SOLUTION INTRAVENOUS ONCE
Status: COMPLETED | OUTPATIENT
Start: 2023-08-17 | End: 2023-08-17

## 2023-08-17 RX ORDER — HEPARIN SODIUM (PORCINE) LOCK FLUSH IV SOLN 100 UNIT/ML 100 UNIT/ML
5 SOLUTION INTRAVENOUS ONCE
OUTPATIENT
Start: 2023-08-17

## 2023-08-17 RX ADMIN — HEPARIN SODIUM (PORCINE) LOCK FLUSH IV SOLN 100 UNIT/ML 500 UNITS: 100 SOLUTION INTRAVENOUS at 14:52:00

## 2023-08-17 NOTE — PROGRESS NOTES
Pt here for C9D1 Abraxane/Gemzar. Arrives Ambulating independently, accompanied by Self. PAC previously accessed in lab. Lab results appropriate to proceed with treatment today. Oral medications included in this regimen:  no    Patient confirms comprehension of cancer treatment schedule:  yes    Pregnancy screening:  Not applicable    Modifications in dose or schedule:  No    Medications appearance and physical integrity checked by RN.  Chemotherapy IV pump settings verified by 2 RNs:  yes     Frequency of blood return and site check throughout administration: Prior to administration, Prior to each drug, and At completion of therapy     Infusion/treatment outcome:  patient tolerated treatment without incident    Education Record    Learner:  Patient  Barriers / Limitations:  None  Method:  Reinforcement  Education / instructions given:  Reinforced treatment schedule  Outcome:  Shows understanding    Discharged Home, Ambulating independently, accompanied by:Self    Patient/family verbalized understanding of future appointments: by printed AVS

## 2023-08-17 NOTE — PATIENT INSTRUCTIONS
Proceed with cycle 9  Monitor neuropathy - numbness and tingling - in fingers and toes. Consider Vitamin B complex once daily. May consider gabapentin at bedtime (prescription).   Or, physical therapy  Consider Boost pudding, or regular pudding for extra calories  Consider protein-flavored albert:  Boost Breeze, Ensure Clear, Premiere Clear, Body [place]  Call as needed  Follow-up prior to cycle 10

## 2023-09-07 ENCOUNTER — OFFICE VISIT (OUTPATIENT)
Dept: HEMATOLOGY/ONCOLOGY | Facility: HOSPITAL | Age: 78
End: 2023-09-07
Attending: STUDENT IN AN ORGANIZED HEALTH CARE EDUCATION/TRAINING PROGRAM
Payer: MEDICARE

## 2023-09-07 VITALS
SYSTOLIC BLOOD PRESSURE: 95 MMHG | RESPIRATION RATE: 16 BRPM | BODY MASS INDEX: 21.1 KG/M2 | DIASTOLIC BLOOD PRESSURE: 65 MMHG | WEIGHT: 126.63 LBS | HEART RATE: 80 BPM | TEMPERATURE: 98 F | HEIGHT: 65 IN | OXYGEN SATURATION: 98 %

## 2023-09-07 DIAGNOSIS — C25.9 ADENOCARCINOMA OF PANCREAS (HCC): Primary | ICD-10-CM

## 2023-09-07 DIAGNOSIS — E87.6 HYPOKALEMIA DUE TO INADEQUATE POTASSIUM INTAKE: ICD-10-CM

## 2023-09-07 DIAGNOSIS — Z51.11 CHEMOTHERAPY MANAGEMENT, ENCOUNTER FOR: ICD-10-CM

## 2023-09-07 DIAGNOSIS — T45.1X5A PERIPHERAL NEUROPATHY DUE TO CHEMOTHERAPY: ICD-10-CM

## 2023-09-07 DIAGNOSIS — G62.0 PERIPHERAL NEUROPATHY DUE TO CHEMOTHERAPY: ICD-10-CM

## 2023-09-07 DIAGNOSIS — R63.4 WEIGHT LOSS: ICD-10-CM

## 2023-09-07 DIAGNOSIS — R53.1 GENERALIZED WEAKNESS: ICD-10-CM

## 2023-09-07 LAB
ALBUMIN SERPL-MCNC: 3.6 G/DL (ref 3.4–5)
ALBUMIN/GLOB SERPL: 1.1 {RATIO} (ref 1–2)
ALP LIVER SERPL-CCNC: 101 U/L
ALT SERPL-CCNC: 14 U/L
ANION GAP SERPL CALC-SCNC: 5 MMOL/L (ref 0–18)
AST SERPL-CCNC: 17 U/L (ref 15–37)
BASOPHILS # BLD AUTO: 0.01 X10(3) UL (ref 0–0.2)
BASOPHILS NFR BLD AUTO: 0.3 %
BILIRUB SERPL-MCNC: 0.6 MG/DL (ref 0.1–2)
BUN BLD-MCNC: 22 MG/DL (ref 7–18)
BUN/CREAT SERPL: 17.7 (ref 10–20)
CALCIUM BLD-MCNC: 9.1 MG/DL (ref 8.5–10.1)
CANCER AG19-9 SERPL-ACNC: 2.5 U/ML (ref ?–37)
CHLORIDE SERPL-SCNC: 106 MMOL/L (ref 98–112)
CO2 SERPL-SCNC: 26 MMOL/L (ref 21–32)
CREAT BLD-MCNC: 1.24 MG/DL
DEPRECATED RDW RBC AUTO: 51.2 FL (ref 35.1–46.3)
EGFRCR SERPLBLD CKD-EPI 2021: 45 ML/MIN/1.73M2 (ref 60–?)
EOSINOPHIL # BLD AUTO: 0.01 X10(3) UL (ref 0–0.7)
EOSINOPHIL NFR BLD AUTO: 0.3 %
ERYTHROCYTE [DISTWIDTH] IN BLOOD BY AUTOMATED COUNT: 14.9 % (ref 11–15)
GLOBULIN PLAS-MCNC: 3.3 G/DL (ref 2.8–4.4)
GLUCOSE BLD-MCNC: 100 MG/DL (ref 70–99)
HCT VFR BLD AUTO: 34.4 %
HGB BLD-MCNC: 11.2 G/DL
IMM GRANULOCYTES # BLD AUTO: 0.02 X10(3) UL (ref 0–1)
IMM GRANULOCYTES NFR BLD: 0.7 %
LYMPHOCYTES # BLD AUTO: 0.68 X10(3) UL (ref 1–4)
LYMPHOCYTES NFR BLD AUTO: 23.2 %
MCH RBC QN AUTO: 30.7 PG (ref 26–34)
MCHC RBC AUTO-ENTMCNC: 32.6 G/DL (ref 31–37)
MCV RBC AUTO: 94.2 FL
MONOCYTES # BLD AUTO: 0.4 X10(3) UL (ref 0.1–1)
MONOCYTES NFR BLD AUTO: 13.7 %
NEUTROPHILS # BLD AUTO: 1.81 X10 (3) UL (ref 1.5–7.7)
NEUTROPHILS # BLD AUTO: 1.81 X10(3) UL (ref 1.5–7.7)
NEUTROPHILS NFR BLD AUTO: 61.8 %
OSMOLALITY SERPL CALC.SUM OF ELEC: 287 MOSM/KG (ref 275–295)
PLATELET # BLD AUTO: 129 10(3)UL (ref 150–450)
POTASSIUM SERPL-SCNC: 3.8 MMOL/L (ref 3.5–5.1)
PROT SERPL-MCNC: 6.9 G/DL (ref 6.4–8.2)
RBC # BLD AUTO: 3.65 X10(6)UL
SODIUM SERPL-SCNC: 137 MMOL/L (ref 136–145)
WBC # BLD AUTO: 2.9 X10(3) UL (ref 4–11)

## 2023-09-07 PROCEDURE — 96413 CHEMO IV INFUSION 1 HR: CPT

## 2023-09-07 PROCEDURE — 80053 COMPREHEN METABOLIC PANEL: CPT

## 2023-09-07 PROCEDURE — 86301 IMMUNOASSAY TUMOR CA 19-9: CPT

## 2023-09-07 PROCEDURE — 96417 CHEMO IV INFUS EACH ADDL SEQ: CPT

## 2023-09-07 PROCEDURE — 96375 TX/PRO/DX INJ NEW DRUG ADDON: CPT

## 2023-09-07 PROCEDURE — 85025 COMPLETE CBC W/AUTO DIFF WBC: CPT

## 2023-09-07 PROCEDURE — 99215 OFFICE O/P EST HI 40 MIN: CPT | Performed by: STUDENT IN AN ORGANIZED HEALTH CARE EDUCATION/TRAINING PROGRAM

## 2023-09-07 RX ORDER — HEPARIN SODIUM (PORCINE) LOCK FLUSH IV SOLN 100 UNIT/ML 100 UNIT/ML
SOLUTION INTRAVENOUS
Status: COMPLETED
Start: 2023-09-07 | End: 2023-09-07

## 2023-09-07 RX ORDER — LOSARTAN POTASSIUM 50 MG/1
50 TABLET ORAL DAILY
Qty: 90 TABLET | Refills: 3 | Status: SHIPPED | OUTPATIENT
Start: 2023-09-07

## 2023-09-07 RX ORDER — HEPARIN SODIUM (PORCINE) LOCK FLUSH IV SOLN 100 UNIT/ML 100 UNIT/ML
5 SOLUTION INTRAVENOUS ONCE
Status: COMPLETED | OUTPATIENT
Start: 2023-09-07 | End: 2023-09-07

## 2023-09-07 RX ORDER — HEPARIN SODIUM (PORCINE) LOCK FLUSH IV SOLN 100 UNIT/ML 100 UNIT/ML
5 SOLUTION INTRAVENOUS ONCE
OUTPATIENT
Start: 2023-09-07

## 2023-09-07 RX ORDER — SODIUM CHLORIDE 9 MG/ML
10 INJECTION INTRAVENOUS ONCE
OUTPATIENT
Start: 2023-09-07

## 2023-09-07 RX ADMIN — HEPARIN SODIUM (PORCINE) LOCK FLUSH IV SOLN 100 UNIT/ML 500 UNITS: 100 SOLUTION INTRAVENOUS at 16:26:00

## 2023-09-07 NOTE — PROGRESS NOTES
Pt here for C10 D1 Abraxane and Gemzar. Arrives Ambulating independently, accompanied by Self from MDV. PORT previously accessed in the lab- good blood return noted. Oral medications included in this regimen:  no    Patient confirms comprehension of cancer treatment schedule:  yes    Pregnancy screening:  Denies possibility of pregnancy    Modifications in dose or schedule:  No    Medications appearance and physical integrity checked by RN. Chemotherapy IV pump settings verified by 2 RNs:  yes     Frequency of blood return and site check throughout administration: Prior to administration, Prior to each drug, and At completion of therapy     Infusion/treatment outcome:  patient tolerated treatment without incident. PORT flushed and Heparin locked before de-accessing. Gauze/tape applied to site. Education Record    Learner:  Patient  Barriers / Limitations:  None  Method:  Discussion and Printed material  Education / instructions given:  Plan of care and future appointment's.    Outcome:  Shows understanding    Discharged Home, Ambulating independently, accompanied by:Self    Patient/family verbalized understanding of future appointments: by printed AVS

## 2023-09-27 ENCOUNTER — NURSE ONLY (OUTPATIENT)
Dept: HEMATOLOGY/ONCOLOGY | Facility: HOSPITAL | Age: 78
End: 2023-09-27
Attending: STUDENT IN AN ORGANIZED HEALTH CARE EDUCATION/TRAINING PROGRAM
Payer: MEDICARE

## 2023-09-27 VITALS
OXYGEN SATURATION: 100 % | DIASTOLIC BLOOD PRESSURE: 48 MMHG | BODY MASS INDEX: 21 KG/M2 | HEART RATE: 86 BPM | WEIGHT: 125.63 LBS | SYSTOLIC BLOOD PRESSURE: 94 MMHG | TEMPERATURE: 98 F | RESPIRATION RATE: 16 BRPM

## 2023-09-27 DIAGNOSIS — T45.1X5A PERIPHERAL NEUROPATHY DUE TO CHEMOTHERAPY: ICD-10-CM

## 2023-09-27 DIAGNOSIS — G62.0 PERIPHERAL NEUROPATHY DUE TO CHEMOTHERAPY: ICD-10-CM

## 2023-09-27 DIAGNOSIS — C25.9 ADENOCARCINOMA OF PANCREAS (HCC): Primary | ICD-10-CM

## 2023-09-27 DIAGNOSIS — Z51.11 CHEMOTHERAPY MANAGEMENT, ENCOUNTER FOR: ICD-10-CM

## 2023-09-27 DIAGNOSIS — R53.1 GENERALIZED WEAKNESS: ICD-10-CM

## 2023-09-27 LAB
ALBUMIN SERPL-MCNC: 3.7 G/DL (ref 3.4–5)
ALBUMIN/GLOB SERPL: 1.1 {RATIO} (ref 1–2)
ALP LIVER SERPL-CCNC: 105 U/L
ALT SERPL-CCNC: 23 U/L
ANION GAP SERPL CALC-SCNC: 8 MMOL/L (ref 0–18)
AST SERPL-CCNC: 24 U/L (ref 15–37)
BASOPHILS # BLD AUTO: 0.02 X10(3) UL (ref 0–0.2)
BASOPHILS NFR BLD AUTO: 0.5 %
BILIRUB SERPL-MCNC: 0.5 MG/DL (ref 0.1–2)
BUN BLD-MCNC: 30 MG/DL (ref 7–18)
BUN/CREAT SERPL: 20.5 (ref 10–20)
CALCIUM BLD-MCNC: 9.4 MG/DL (ref 8.5–10.1)
CANCER AG19-9 SERPL-ACNC: <2 U/ML (ref ?–37)
CHLORIDE SERPL-SCNC: 110 MMOL/L (ref 98–112)
CO2 SERPL-SCNC: 24 MMOL/L (ref 21–32)
CREAT BLD-MCNC: 1.46 MG/DL
DEPRECATED RDW RBC AUTO: 53.5 FL (ref 35.1–46.3)
EGFRCR SERPLBLD CKD-EPI 2021: 37 ML/MIN/1.73M2 (ref 60–?)
EOSINOPHIL # BLD AUTO: 0.01 X10(3) UL (ref 0–0.7)
EOSINOPHIL NFR BLD AUTO: 0.3 %
ERYTHROCYTE [DISTWIDTH] IN BLOOD BY AUTOMATED COUNT: 15.5 % (ref 11–15)
GLOBULIN PLAS-MCNC: 3.5 G/DL (ref 2.8–4.4)
GLUCOSE BLD-MCNC: 112 MG/DL (ref 70–99)
HCT VFR BLD AUTO: 35.5 %
HGB BLD-MCNC: 11.4 G/DL
IMM GRANULOCYTES # BLD AUTO: 0.02 X10(3) UL (ref 0–1)
IMM GRANULOCYTES NFR BLD: 0.5 %
LYMPHOCYTES # BLD AUTO: 0.82 X10(3) UL (ref 1–4)
LYMPHOCYTES NFR BLD AUTO: 21.6 %
MCH RBC QN AUTO: 30.9 PG (ref 26–34)
MCHC RBC AUTO-ENTMCNC: 32.1 G/DL (ref 31–37)
MCV RBC AUTO: 96.2 FL
MONOCYTES # BLD AUTO: 0.54 X10(3) UL (ref 0.1–1)
MONOCYTES NFR BLD AUTO: 14.2 %
NEUTROPHILS # BLD AUTO: 2.39 X10 (3) UL (ref 1.5–7.7)
NEUTROPHILS # BLD AUTO: 2.39 X10(3) UL (ref 1.5–7.7)
NEUTROPHILS NFR BLD AUTO: 62.9 %
OSMOLALITY SERPL CALC.SUM OF ELEC: 301 MOSM/KG (ref 275–295)
PLATELET # BLD AUTO: 141 10(3)UL (ref 150–450)
POTASSIUM SERPL-SCNC: 4.1 MMOL/L (ref 3.5–5.1)
PROT SERPL-MCNC: 7.2 G/DL (ref 6.4–8.2)
RBC # BLD AUTO: 3.69 X10(6)UL
SODIUM SERPL-SCNC: 142 MMOL/L (ref 136–145)
WBC # BLD AUTO: 3.8 X10(3) UL (ref 4–11)

## 2023-09-27 PROCEDURE — 86301 IMMUNOASSAY TUMOR CA 19-9: CPT

## 2023-09-27 PROCEDURE — 99215 OFFICE O/P EST HI 40 MIN: CPT | Performed by: STUDENT IN AN ORGANIZED HEALTH CARE EDUCATION/TRAINING PROGRAM

## 2023-09-27 PROCEDURE — 80053 COMPREHEN METABOLIC PANEL: CPT

## 2023-09-27 PROCEDURE — 36415 COLL VENOUS BLD VENIPUNCTURE: CPT

## 2023-09-27 PROCEDURE — 85025 COMPLETE CBC W/AUTO DIFF WBC: CPT

## 2023-09-27 RX ORDER — SIMVASTATIN 40 MG
40 TABLET ORAL EVERY EVENING
Qty: 90 TABLET | Refills: 3 | Status: SHIPPED | OUTPATIENT
Start: 2023-09-27

## 2023-09-28 ENCOUNTER — OFFICE VISIT (OUTPATIENT)
Dept: HEMATOLOGY/ONCOLOGY | Facility: HOSPITAL | Age: 78
End: 2023-09-28
Attending: STUDENT IN AN ORGANIZED HEALTH CARE EDUCATION/TRAINING PROGRAM
Payer: MEDICARE

## 2023-09-28 VITALS
HEART RATE: 85 BPM | TEMPERATURE: 98 F | DIASTOLIC BLOOD PRESSURE: 54 MMHG | SYSTOLIC BLOOD PRESSURE: 136 MMHG | RESPIRATION RATE: 16 BRPM | OXYGEN SATURATION: 99 %

## 2023-09-28 DIAGNOSIS — E87.6 HYPOKALEMIA DUE TO INADEQUATE POTASSIUM INTAKE: ICD-10-CM

## 2023-09-28 DIAGNOSIS — R53.1 GENERALIZED WEAKNESS: ICD-10-CM

## 2023-09-28 DIAGNOSIS — Z51.11 CHEMOTHERAPY MANAGEMENT, ENCOUNTER FOR: ICD-10-CM

## 2023-09-28 DIAGNOSIS — C25.9 ADENOCARCINOMA OF PANCREAS (HCC): Primary | ICD-10-CM

## 2023-09-28 PROCEDURE — 96413 CHEMO IV INFUSION 1 HR: CPT

## 2023-09-28 PROCEDURE — 96375 TX/PRO/DX INJ NEW DRUG ADDON: CPT

## 2023-09-28 PROCEDURE — 96417 CHEMO IV INFUS EACH ADDL SEQ: CPT

## 2023-09-28 RX ORDER — HEPARIN SODIUM (PORCINE) LOCK FLUSH IV SOLN 100 UNIT/ML 100 UNIT/ML
5 SOLUTION INTRAVENOUS ONCE
Status: COMPLETED | OUTPATIENT
Start: 2023-09-28 | End: 2023-09-28

## 2023-09-28 RX ORDER — SODIUM CHLORIDE 9 MG/ML
10 INJECTION INTRAVENOUS ONCE
OUTPATIENT
Start: 2023-09-28

## 2023-09-28 RX ORDER — HEPARIN SODIUM (PORCINE) LOCK FLUSH IV SOLN 100 UNIT/ML 100 UNIT/ML
5 SOLUTION INTRAVENOUS ONCE
OUTPATIENT
Start: 2023-09-28

## 2023-09-28 RX ORDER — HEPARIN SODIUM (PORCINE) LOCK FLUSH IV SOLN 100 UNIT/ML 100 UNIT/ML
SOLUTION INTRAVENOUS
Status: COMPLETED
Start: 2023-09-28 | End: 2023-09-28

## 2023-09-28 RX ADMIN — HEPARIN SODIUM (PORCINE) LOCK FLUSH IV SOLN 100 UNIT/ML 500 UNITS: 100 SOLUTION INTRAVENOUS at 11:05:00

## 2023-09-28 NOTE — PROGRESS NOTES
Pt here for C11D1 Abraxane/Gemxar. Arrives Ambulating independently, accompanied by Self       Oral medications included in this regimen:  no    Patient confirms comprehension of cancer treatment schedule:  yes    Pregnancy screening:  Not applicable    Modifications in dose or schedule:  No    Medications appearance and physical integrity checked by RN.  Chemotherapy IV pump settings verified by 2 RNs:  yes     Frequency of blood return and site check throughout administration: Prior to administration, Prior to each drug, and At completion of therapy     Infusion/treatment outcome:  patient tolerated treatment without incident    Education Record    Learner:  Patient  Barriers / Limitations:  None  Method:  Brief focused  Education / instructions given:  Plan of care for treatmnet today  Outcome:  Shows understanding    Discharged Home, Ambulating independently, accompanied by:Self    Patient/family verbalized understanding of future appointments: by printed AVS

## 2023-10-19 ENCOUNTER — NURSE ONLY (OUTPATIENT)
Dept: HEMATOLOGY/ONCOLOGY | Facility: HOSPITAL | Age: 78
End: 2023-10-19
Attending: STUDENT IN AN ORGANIZED HEALTH CARE EDUCATION/TRAINING PROGRAM
Payer: MEDICARE

## 2023-10-19 VITALS
OXYGEN SATURATION: 98 % | TEMPERATURE: 98 F | WEIGHT: 125.5 LBS | HEIGHT: 65 IN | RESPIRATION RATE: 16 BRPM | BODY MASS INDEX: 20.91 KG/M2 | HEART RATE: 87 BPM | DIASTOLIC BLOOD PRESSURE: 75 MMHG | SYSTOLIC BLOOD PRESSURE: 142 MMHG

## 2023-10-19 DIAGNOSIS — Z51.11 CHEMOTHERAPY MANAGEMENT, ENCOUNTER FOR: ICD-10-CM

## 2023-10-19 DIAGNOSIS — C25.9 ADENOCARCINOMA OF PANCREAS (HCC): Primary | ICD-10-CM

## 2023-10-19 DIAGNOSIS — G62.0 PERIPHERAL NEUROPATHY DUE TO CHEMOTHERAPY: ICD-10-CM

## 2023-10-19 DIAGNOSIS — R63.4 WEIGHT LOSS: ICD-10-CM

## 2023-10-19 DIAGNOSIS — E87.6 HYPOKALEMIA DUE TO INADEQUATE POTASSIUM INTAKE: ICD-10-CM

## 2023-10-19 DIAGNOSIS — T45.1X5A PERIPHERAL NEUROPATHY DUE TO CHEMOTHERAPY: ICD-10-CM

## 2023-10-19 DIAGNOSIS — R53.1 GENERALIZED WEAKNESS: ICD-10-CM

## 2023-10-19 LAB
ALBUMIN SERPL-MCNC: 3.6 G/DL (ref 3.4–5)
ALBUMIN/GLOB SERPL: 1.1 {RATIO} (ref 1–2)
ALP LIVER SERPL-CCNC: 116 U/L
ALT SERPL-CCNC: 17 U/L
ANION GAP SERPL CALC-SCNC: 7 MMOL/L (ref 0–18)
AST SERPL-CCNC: 20 U/L (ref 15–37)
BASOPHILS # BLD AUTO: 0.01 X10(3) UL (ref 0–0.2)
BASOPHILS NFR BLD AUTO: 0.3 %
BILIRUB SERPL-MCNC: 0.7 MG/DL (ref 0.1–2)
BUN BLD-MCNC: 12 MG/DL (ref 7–18)
BUN/CREAT SERPL: 13 (ref 10–20)
CALCIUM BLD-MCNC: 9.3 MG/DL (ref 8.5–10.1)
CANCER AG19-9 SERPL-ACNC: 3.1 U/ML (ref ?–37)
CHLORIDE SERPL-SCNC: 109 MMOL/L (ref 98–112)
CO2 SERPL-SCNC: 25 MMOL/L (ref 21–32)
CREAT BLD-MCNC: 0.92 MG/DL
DEPRECATED RDW RBC AUTO: 53.1 FL (ref 35.1–46.3)
EGFRCR SERPLBLD CKD-EPI 2021: 64 ML/MIN/1.73M2 (ref 60–?)
EOSINOPHIL # BLD AUTO: 0.01 X10(3) UL (ref 0–0.7)
EOSINOPHIL NFR BLD AUTO: 0.3 %
ERYTHROCYTE [DISTWIDTH] IN BLOOD BY AUTOMATED COUNT: 15.4 % (ref 11–15)
GLOBULIN PLAS-MCNC: 3.2 G/DL (ref 2.8–4.4)
GLUCOSE BLD-MCNC: 96 MG/DL (ref 70–99)
HCT VFR BLD AUTO: 33.8 %
HGB BLD-MCNC: 11.1 G/DL
IMM GRANULOCYTES # BLD AUTO: 0.02 X10(3) UL (ref 0–1)
IMM GRANULOCYTES NFR BLD: 0.5 %
LYMPHOCYTES # BLD AUTO: 0.81 X10(3) UL (ref 1–4)
LYMPHOCYTES NFR BLD AUTO: 22.2 %
MCH RBC QN AUTO: 31 PG (ref 26–34)
MCHC RBC AUTO-ENTMCNC: 32.8 G/DL (ref 31–37)
MCV RBC AUTO: 94.4 FL
MONOCYTES # BLD AUTO: 0.47 X10(3) UL (ref 0.1–1)
MONOCYTES NFR BLD AUTO: 12.9 %
NEUTROPHILS # BLD AUTO: 2.33 X10 (3) UL (ref 1.5–7.7)
NEUTROPHILS # BLD AUTO: 2.33 X10(3) UL (ref 1.5–7.7)
NEUTROPHILS NFR BLD AUTO: 63.8 %
OSMOLALITY SERPL CALC.SUM OF ELEC: 292 MOSM/KG (ref 275–295)
PLATELET # BLD AUTO: 135 10(3)UL (ref 150–450)
POTASSIUM SERPL-SCNC: 3.6 MMOL/L (ref 3.5–5.1)
PROT SERPL-MCNC: 6.8 G/DL (ref 6.4–8.2)
RBC # BLD AUTO: 3.58 X10(6)UL
SODIUM SERPL-SCNC: 141 MMOL/L (ref 136–145)
WBC # BLD AUTO: 3.7 X10(3) UL (ref 4–11)

## 2023-10-19 PROCEDURE — 96375 TX/PRO/DX INJ NEW DRUG ADDON: CPT

## 2023-10-19 PROCEDURE — 85025 COMPLETE CBC W/AUTO DIFF WBC: CPT

## 2023-10-19 PROCEDURE — 99215 OFFICE O/P EST HI 40 MIN: CPT | Performed by: STUDENT IN AN ORGANIZED HEALTH CARE EDUCATION/TRAINING PROGRAM

## 2023-10-19 PROCEDURE — 96417 CHEMO IV INFUS EACH ADDL SEQ: CPT

## 2023-10-19 PROCEDURE — 96413 CHEMO IV INFUSION 1 HR: CPT

## 2023-10-19 PROCEDURE — 86301 IMMUNOASSAY TUMOR CA 19-9: CPT

## 2023-10-19 PROCEDURE — 80053 COMPREHEN METABOLIC PANEL: CPT

## 2023-10-19 RX ORDER — HEPARIN SODIUM (PORCINE) LOCK FLUSH IV SOLN 100 UNIT/ML 100 UNIT/ML
5 SOLUTION INTRAVENOUS ONCE
Status: COMPLETED | OUTPATIENT
Start: 2023-10-19 | End: 2023-10-19

## 2023-10-19 RX ORDER — LANSOPRAZOLE 30 MG/1
30 CAPSULE, DELAYED RELEASE ORAL
Qty: 90 CAPSULE | Refills: 3 | Status: SHIPPED | OUTPATIENT
Start: 2023-10-19

## 2023-10-19 RX ORDER — HEPARIN SODIUM (PORCINE) LOCK FLUSH IV SOLN 100 UNIT/ML 100 UNIT/ML
5 SOLUTION INTRAVENOUS ONCE
OUTPATIENT
Start: 2023-10-19

## 2023-10-19 RX ORDER — SODIUM CHLORIDE 9 MG/ML
10 INJECTION INTRAVENOUS ONCE
OUTPATIENT
Start: 2023-10-19

## 2023-10-19 RX ADMIN — HEPARIN SODIUM (PORCINE) LOCK FLUSH IV SOLN 100 UNIT/ML 500 UNITS: 100 SOLUTION INTRAVENOUS at 16:32:00

## 2023-10-19 NOTE — PROGRESS NOTES
Pt here for C12D1 Abraxane/Gemzar. Arrives Ambulating independently, accompanied by Self. PAC previously accessed in lab. Oral medications included in this regimen:  no    Patient confirms comprehension of cancer treatment schedule:  yes    Pregnancy screening:  Not applicable    Modifications in dose or schedule:  No    Medications appearance and physical integrity checked by RN.  Chemotherapy IV pump settings verified by 2 RNs:  yes     Frequency of blood return and site check throughout administration: Prior to administration, Prior to each drug, and At completion of therapy     Infusion/treatment outcome:  patient tolerated treatment without incident    Education Record    Learner:  Patient  Barriers / Limitations:  None  Method:  Reinforcement  Education / instructions given:  Reinforced treatment schedule  Outcome:  Shows understanding    Discharged Home, Ambulating independently, accompanied by:Self    Patient/family verbalized understanding of future appointments: by printed AVS

## 2023-11-04 ENCOUNTER — HOSPITAL ENCOUNTER (OUTPATIENT)
Dept: CT IMAGING | Facility: HOSPITAL | Age: 78
Discharge: HOME | End: 2023-11-04
Attending: STUDENT IN AN ORGANIZED HEALTH CARE EDUCATION/TRAINING PROGRAM
Payer: MEDICARE

## 2023-11-04 DIAGNOSIS — C25.9 ADENOCARCINOMA OF PANCREAS (HCC): ICD-10-CM

## 2023-11-04 DIAGNOSIS — Z51.11 CHEMOTHERAPY MANAGEMENT, ENCOUNTER FOR: ICD-10-CM

## 2023-11-04 LAB
CREAT BLD-MCNC: 1 MG/DL
EGFRCR SERPLBLD CKD-EPI 2021: 58 ML/MIN/1.73M2 (ref 60–?)

## 2023-11-04 PROCEDURE — 71260 CT THORAX DX C+: CPT | Performed by: STUDENT IN AN ORGANIZED HEALTH CARE EDUCATION/TRAINING PROGRAM

## 2023-11-04 PROCEDURE — 74177 CT ABD & PELVIS W/CONTRAST: CPT | Performed by: STUDENT IN AN ORGANIZED HEALTH CARE EDUCATION/TRAINING PROGRAM

## 2023-11-04 PROCEDURE — 82565 ASSAY OF CREATININE: CPT

## 2023-11-08 ENCOUNTER — APPOINTMENT (OUTPATIENT)
Dept: HEMATOLOGY/ONCOLOGY | Facility: HOSPITAL | Age: 78
End: 2023-11-08
Attending: STUDENT IN AN ORGANIZED HEALTH CARE EDUCATION/TRAINING PROGRAM
Payer: MEDICARE

## 2023-11-09 ENCOUNTER — NURSE ONLY (OUTPATIENT)
Dept: HEMATOLOGY/ONCOLOGY | Facility: HOSPITAL | Age: 78
End: 2023-11-09
Attending: STUDENT IN AN ORGANIZED HEALTH CARE EDUCATION/TRAINING PROGRAM
Payer: MEDICARE

## 2023-11-09 VITALS
WEIGHT: 124 LBS | TEMPERATURE: 98 F | RESPIRATION RATE: 18 BRPM | HEART RATE: 80 BPM | OXYGEN SATURATION: 100 % | BODY MASS INDEX: 20.66 KG/M2 | HEIGHT: 65 IN | SYSTOLIC BLOOD PRESSURE: 152 MMHG | DIASTOLIC BLOOD PRESSURE: 83 MMHG

## 2023-11-09 DIAGNOSIS — Z51.11 CHEMOTHERAPY MANAGEMENT, ENCOUNTER FOR: ICD-10-CM

## 2023-11-09 DIAGNOSIS — R63.4 WEIGHT LOSS: ICD-10-CM

## 2023-11-09 DIAGNOSIS — R30.0 DYSURIA: ICD-10-CM

## 2023-11-09 DIAGNOSIS — C25.9 ADENOCARCINOMA OF PANCREAS (HCC): Primary | ICD-10-CM

## 2023-11-09 DIAGNOSIS — T45.1X5A PERIPHERAL NEUROPATHY DUE TO CHEMOTHERAPY: ICD-10-CM

## 2023-11-09 DIAGNOSIS — R53.1 GENERALIZED WEAKNESS: ICD-10-CM

## 2023-11-09 DIAGNOSIS — G62.0 PERIPHERAL NEUROPATHY DUE TO CHEMOTHERAPY: ICD-10-CM

## 2023-11-09 LAB
ALBUMIN SERPL-MCNC: 3.9 G/DL (ref 3.2–4.8)
ALBUMIN/GLOB SERPL: 1.4 {RATIO} (ref 1–2)
ALP LIVER SERPL-CCNC: 118 U/L
ALT SERPL-CCNC: 11 U/L
ANION GAP SERPL CALC-SCNC: 7 MMOL/L (ref 0–18)
AST SERPL-CCNC: 25 U/L (ref ?–34)
BASOPHILS # BLD AUTO: 0.01 X10(3) UL (ref 0–0.2)
BASOPHILS NFR BLD AUTO: 0.3 %
BILIRUB SERPL-MCNC: 0.7 MG/DL (ref 0.2–1.1)
BILIRUB UR QL: NEGATIVE
BUN BLD-MCNC: 13 MG/DL (ref 9–23)
BUN/CREAT SERPL: 14.1 (ref 10–20)
CALCIUM BLD-MCNC: 9.5 MG/DL (ref 8.7–10.4)
CANCER AG19-9 SERPL-ACNC: 4.9 U/ML (ref ?–37)
CANCER AG19-9 SERPL-ACNC: <2 U/ML (ref ?–35)
CHLORIDE SERPL-SCNC: 109 MMOL/L (ref 98–112)
CO2 SERPL-SCNC: 24 MMOL/L (ref 21–32)
COLOR UR: YELLOW
CREAT BLD-MCNC: 0.92 MG/DL
DEPRECATED RDW RBC AUTO: 54.2 FL (ref 35.1–46.3)
EGFRCR SERPLBLD CKD-EPI 2021: 64 ML/MIN/1.73M2 (ref 60–?)
EOSINOPHIL # BLD AUTO: 0.02 X10(3) UL (ref 0–0.7)
EOSINOPHIL NFR BLD AUTO: 0.6 %
ERYTHROCYTE [DISTWIDTH] IN BLOOD BY AUTOMATED COUNT: 15.6 % (ref 11–15)
GLOBULIN PLAS-MCNC: 2.7 G/DL (ref 2.8–4.4)
GLUCOSE BLD-MCNC: 105 MG/DL (ref 70–99)
GLUCOSE UR-MCNC: NORMAL MG/DL
HCT VFR BLD AUTO: 35.3 %
HGB BLD-MCNC: 11.4 G/DL
IMM GRANULOCYTES # BLD AUTO: 0.01 X10(3) UL (ref 0–1)
IMM GRANULOCYTES NFR BLD: 0.3 %
KETONES UR-MCNC: NEGATIVE MG/DL
LEUKOCYTE ESTERASE UR QL STRIP.AUTO: 500
LYMPHOCYTES # BLD AUTO: 0.73 X10(3) UL (ref 1–4)
LYMPHOCYTES NFR BLD AUTO: 22.5 %
MCH RBC QN AUTO: 31.1 PG (ref 26–34)
MCHC RBC AUTO-ENTMCNC: 32.3 G/DL (ref 31–37)
MCV RBC AUTO: 96.2 FL
MONOCYTES # BLD AUTO: 0.36 X10(3) UL (ref 0.1–1)
MONOCYTES NFR BLD AUTO: 11.1 %
NEUTROPHILS # BLD AUTO: 2.11 X10 (3) UL (ref 1.5–7.7)
NEUTROPHILS # BLD AUTO: 2.11 X10(3) UL (ref 1.5–7.7)
NEUTROPHILS NFR BLD AUTO: 65.2 %
NITRITE UR QL STRIP.AUTO: NEGATIVE
OSMOLALITY SERPL CALC.SUM OF ELEC: 290 MOSM/KG (ref 275–295)
PH UR: 5.5 [PH] (ref 5–8)
PLATELET # BLD AUTO: 136 10(3)UL (ref 150–450)
POTASSIUM SERPL-SCNC: 3.5 MMOL/L (ref 3.5–5.1)
PROT SERPL-MCNC: 6.6 G/DL (ref 5.7–8.2)
PROT UR-MCNC: 30 MG/DL
RBC # BLD AUTO: 3.67 X10(6)UL
RBC #/AREA URNS AUTO: >10 /HPF
SODIUM SERPL-SCNC: 140 MMOL/L (ref 136–145)
SP GR UR STRIP: 1.01 (ref 1–1.03)
UROBILINOGEN UR STRIP-ACNC: NORMAL
WBC # BLD AUTO: 3.2 X10(3) UL (ref 4–11)
WBC #/AREA URNS AUTO: >50 /HPF
WBC CLUMPS UR QL AUTO: PRESENT /HPF

## 2023-11-09 PROCEDURE — 87086 URINE CULTURE/COLONY COUNT: CPT

## 2023-11-09 PROCEDURE — 81001 URINALYSIS AUTO W/SCOPE: CPT

## 2023-11-09 PROCEDURE — 96413 CHEMO IV INFUSION 1 HR: CPT

## 2023-11-09 PROCEDURE — 80053 COMPREHEN METABOLIC PANEL: CPT

## 2023-11-09 PROCEDURE — 87186 SC STD MICRODIL/AGAR DIL: CPT

## 2023-11-09 PROCEDURE — 85025 COMPLETE CBC W/AUTO DIFF WBC: CPT

## 2023-11-09 PROCEDURE — 87077 CULTURE AEROBIC IDENTIFY: CPT

## 2023-11-09 PROCEDURE — 96417 CHEMO IV INFUS EACH ADDL SEQ: CPT

## 2023-11-09 PROCEDURE — 86301 IMMUNOASSAY TUMOR CA 19-9: CPT

## 2023-11-09 PROCEDURE — 96375 TX/PRO/DX INJ NEW DRUG ADDON: CPT

## 2023-11-09 RX ORDER — HEPARIN SODIUM (PORCINE) LOCK FLUSH IV SOLN 100 UNIT/ML 100 UNIT/ML
SOLUTION INTRAVENOUS
Status: COMPLETED
Start: 2023-11-09 | End: 2023-11-09

## 2023-11-09 RX ADMIN — HEPARIN SODIUM (PORCINE) LOCK FLUSH IV SOLN 100 UNIT/ML 500 UNITS: 100 SOLUTION INTRAVENOUS at 16:22:00

## 2023-11-09 NOTE — PROGRESS NOTES
Pt here for C13 Abraxane/[place]. Arrives Ambulating with cane, accompanied by Self     Patient was evaluated today by MD.  Oral medications included in this regimen:  no  Patient confirms comprehension of cancer treatment schedule:  yes  Pregnancy screening:  Not applicable    Modifications in dose or schedule:  No    Medications appearance and physical integrity checked by RN: yes. Chemotherapy IV pump settings verified by 2 RNs:  Yes. Frequency of blood return and site check throughout administration: Prior to administration, Prior to each drug, and At completion of therapy     Infusion/treatment outcome:  patient tolerated treatment without incident    Education Record    Learner:  Patient  Barriers / Limitations:  None  Method:  Reinforcement  Education / instructions given:  Plan of care   Outcome:  Shows understanding    Discharged Home, Ambulating with cane.  Patient/family verbalized understanding of future appointments: by [facility]

## 2023-11-10 ENCOUNTER — TELEPHONE (OUTPATIENT)
Dept: HEMATOLOGY/ONCOLOGY | Facility: HOSPITAL | Age: 78
End: 2023-11-10

## 2023-11-10 RX ORDER — SULFAMETHOXAZOLE AND TRIMETHOPRIM 800; 160 MG/1; MG/1
1 TABLET ORAL 2 TIMES DAILY
Qty: 14 TABLET | Refills: 0 | Status: SHIPPED | OUTPATIENT
Start: 2023-11-10 | End: 2023-11-17

## 2023-11-10 NOTE — TELEPHONE ENCOUNTER
UA and UCx shows E coli UTI. Will send Bactrim x7 days. Discussed with pt.      Irais Fox, [address]

## 2023-11-30 ENCOUNTER — NURSE ONLY (OUTPATIENT)
Dept: HEMATOLOGY/ONCOLOGY | Facility: HOSPITAL | Age: 78
End: 2023-11-30
Attending: STUDENT IN AN ORGANIZED HEALTH CARE EDUCATION/TRAINING PROGRAM
Payer: MEDICARE

## 2023-11-30 ENCOUNTER — OFFICE VISIT (OUTPATIENT)
Dept: HEMATOLOGY/ONCOLOGY | Facility: HOSPITAL | Age: 78
End: 2023-11-30
Attending: PHYSICIAN ASSISTANT
Payer: MEDICARE

## 2023-11-30 VITALS
RESPIRATION RATE: 16 BRPM | TEMPERATURE: 98 F | HEART RATE: 88 BPM | HEIGHT: 65 IN | OXYGEN SATURATION: 97 % | WEIGHT: 123.63 LBS | DIASTOLIC BLOOD PRESSURE: 86 MMHG | SYSTOLIC BLOOD PRESSURE: 151 MMHG | BODY MASS INDEX: 20.6 KG/M2

## 2023-11-30 DIAGNOSIS — C25.9 ADENOCARCINOMA OF PANCREAS (HCC): Primary | ICD-10-CM

## 2023-11-30 DIAGNOSIS — R53.1 GENERALIZED WEAKNESS: ICD-10-CM

## 2023-11-30 DIAGNOSIS — Z51.11 CHEMOTHERAPY MANAGEMENT, ENCOUNTER FOR: ICD-10-CM

## 2023-11-30 DIAGNOSIS — G62.0 PERIPHERAL NEUROPATHY DUE TO CHEMOTHERAPY: ICD-10-CM

## 2023-11-30 DIAGNOSIS — L29.9 PRURITUS: ICD-10-CM

## 2023-11-30 DIAGNOSIS — E87.6 HYPOKALEMIA DUE TO INADEQUATE POTASSIUM INTAKE: ICD-10-CM

## 2023-11-30 DIAGNOSIS — T45.1X5A PERIPHERAL NEUROPATHY DUE TO CHEMOTHERAPY: ICD-10-CM

## 2023-11-30 LAB
ALBUMIN SERPL-MCNC: 4 G/DL (ref 3.2–4.8)
ALBUMIN/GLOB SERPL: 1.5 {RATIO} (ref 1–2)
ALP LIVER SERPL-CCNC: 112 U/L
ALT SERPL-CCNC: 14 U/L
ANION GAP SERPL CALC-SCNC: 8 MMOL/L (ref 0–18)
AST SERPL-CCNC: 20 U/L (ref ?–34)
BASOPHILS # BLD AUTO: 0.01 X10(3) UL (ref 0–0.2)
BASOPHILS NFR BLD AUTO: 0.4 %
BILIRUB SERPL-MCNC: 0.7 MG/DL (ref 0.2–1.1)
BUN BLD-MCNC: 14 MG/DL (ref 9–23)
BUN/CREAT SERPL: 15.1 (ref 10–20)
CALCIUM BLD-MCNC: 9.7 MG/DL (ref 8.7–10.4)
CANCER AG19-9 SERPL-ACNC: 11.9 U/ML (ref ?–35)
CHLORIDE SERPL-SCNC: 104 MMOL/L (ref 98–112)
CO2 SERPL-SCNC: 26 MMOL/L (ref 21–32)
CREAT BLD-MCNC: 0.93 MG/DL
DEPRECATED RDW RBC AUTO: 54.2 FL (ref 35.1–46.3)
EGFRCR SERPLBLD CKD-EPI 2021: 63 ML/MIN/1.73M2 (ref 60–?)
EOSINOPHIL # BLD AUTO: 0.02 X10(3) UL (ref 0–0.7)
EOSINOPHIL NFR BLD AUTO: 0.8 %
ERYTHROCYTE [DISTWIDTH] IN BLOOD BY AUTOMATED COUNT: 15.7 % (ref 11–15)
GLOBULIN PLAS-MCNC: 2.7 G/DL (ref 2.8–4.4)
GLUCOSE BLD-MCNC: 102 MG/DL (ref 70–99)
HCT VFR BLD AUTO: 32.8 %
HGB BLD-MCNC: 10.9 G/DL
IMM GRANULOCYTES # BLD AUTO: 0.01 X10(3) UL (ref 0–1)
IMM GRANULOCYTES NFR BLD: 0.4 %
LYMPHOCYTES # BLD AUTO: 0.66 X10(3) UL (ref 1–4)
LYMPHOCYTES NFR BLD AUTO: 26.6 %
MCH RBC QN AUTO: 31.5 PG (ref 26–34)
MCHC RBC AUTO-ENTMCNC: 33.2 G/DL (ref 31–37)
MCV RBC AUTO: 94.8 FL
MONOCYTES # BLD AUTO: 0.36 X10(3) UL (ref 0.1–1)
MONOCYTES NFR BLD AUTO: 14.5 %
NEUTROPHILS # BLD AUTO: 1.42 X10 (3) UL (ref 1.5–7.7)
NEUTROPHILS # BLD AUTO: 1.42 X10(3) UL (ref 1.5–7.7)
NEUTROPHILS NFR BLD AUTO: 57.3 %
OSMOLALITY SERPL CALC.SUM OF ELEC: 287 MOSM/KG (ref 275–295)
PLATELET # BLD AUTO: 113 10(3)UL (ref 150–450)
POTASSIUM SERPL-SCNC: 3.6 MMOL/L (ref 3.5–5.1)
PROT SERPL-MCNC: 6.7 G/DL (ref 5.7–8.2)
RBC # BLD AUTO: 3.46 X10(6)UL
SODIUM SERPL-SCNC: 138 MMOL/L (ref 136–145)
WBC # BLD AUTO: 2.5 X10(3) UL (ref 4–11)

## 2023-11-30 PROCEDURE — 99215 OFFICE O/P EST HI 40 MIN: CPT | Performed by: PHYSICIAN ASSISTANT

## 2023-11-30 PROCEDURE — 85025 COMPLETE CBC W/AUTO DIFF WBC: CPT

## 2023-11-30 PROCEDURE — 80053 COMPREHEN METABOLIC PANEL: CPT

## 2023-11-30 PROCEDURE — 96367 TX/PROPH/DG ADDL SEQ IV INF: CPT

## 2023-11-30 PROCEDURE — 96417 CHEMO IV INFUS EACH ADDL SEQ: CPT

## 2023-11-30 PROCEDURE — 86301 IMMUNOASSAY TUMOR CA 19-9: CPT

## 2023-11-30 PROCEDURE — 96413 CHEMO IV INFUSION 1 HR: CPT

## 2023-11-30 RX ORDER — SODIUM CHLORIDE 9 MG/ML
10 INJECTION INTRAVENOUS ONCE
OUTPATIENT
Start: 2023-11-30

## 2023-11-30 RX ORDER — HEPARIN SODIUM (PORCINE) LOCK FLUSH IV SOLN 100 UNIT/ML 100 UNIT/ML
5 SOLUTION INTRAVENOUS ONCE
Status: COMPLETED | OUTPATIENT
Start: 2023-11-30 | End: 2023-11-30

## 2023-11-30 RX ORDER — HEPARIN SODIUM (PORCINE) LOCK FLUSH IV SOLN 100 UNIT/ML 100 UNIT/ML
5 SOLUTION INTRAVENOUS ONCE
OUTPATIENT
Start: 2023-11-30

## 2023-11-30 RX ORDER — MONTELUKAST SODIUM 10 MG/1
10 TABLET ORAL NIGHTLY
Qty: 90 TABLET | Refills: 1 | Status: SHIPPED | OUTPATIENT
Start: 2023-11-30

## 2023-11-30 RX ORDER — GABAPENTIN 100 MG/1
100 CAPSULE ORAL NIGHTLY
Qty: 30 CAPSULE | Refills: 2 | Status: SHIPPED | OUTPATIENT
Start: 2023-11-30

## 2023-11-30 RX ORDER — HEPARIN SODIUM (PORCINE) LOCK FLUSH IV SOLN 100 UNIT/ML 100 UNIT/ML
SOLUTION INTRAVENOUS
Status: COMPLETED
Start: 2023-11-30 | End: 2023-11-30

## 2023-11-30 RX ORDER — MONTELUKAST SODIUM 10 MG/1
10 TABLET ORAL NIGHTLY
Qty: 30 TABLET | Refills: 1 | Status: SHIPPED | OUTPATIENT
Start: 2023-11-30 | End: 2023-11-30

## 2023-11-30 RX ADMIN — HEPARIN SODIUM (PORCINE) LOCK FLUSH IV SOLN 100 UNIT/ML 500 UNITS: 100 SOLUTION INTRAVENOUS at 16:20:00

## 2023-11-30 NOTE — PATIENT INSTRUCTIONS
Proceed with Abraxane/Gemcitabine today. Slight reduction in Abraxane dose  Start Gabapentin 100 mg one tablet at bedtime. Call office if fatigue or dizziness occurs  Start Montelukast 10 mg at bedtime for itching.   Check if have this medication at home  Call as needed  Follow-up prior to cycle 15

## 2023-11-30 NOTE — PROGRESS NOTES
Pt here for C14D1 Drug(s)Abraxane and Gemzar. Arrives Ambulating independently, accompanied by Self from MDV. PORT previously accessed in the lab- good blood return noted. Patient was evaluated today by APRIL. Oral medications included in this regimen:  no    Patient confirms comprehension of cancer treatment schedule:  yes    Pregnancy screening:  Denies possibility of pregnancy    Modifications in dose or schedule:  Yes- Slight reduction in Abraxane dosing. Medications appearance and physical integrity checked by RN: yes. Chemotherapy IV pump settings verified by 2 RNs:  Yes. Frequency of blood return and site check throughout administration: Prior to administration, Prior to each drug, and At completion of therapy     Infusion/treatment outcome:  patient tolerated treatment without incident  PORT flushed and Heparin locked before de-accessing. Gauze/tape applied to site. Education Record    Learner:  Patient  Barriers / Limitations:  None  Method:  Discussion  Education / instructions given:  Plan of care and future appointment's scheduled.    Outcome:  Shows understanding    Discharged Home, Ambulating independently, accompanied by:Self    Patient/family verbalized understanding of future appointments: by printed AVS

## 2023-12-21 ENCOUNTER — OFFICE VISIT (OUTPATIENT)
Dept: HEMATOLOGY/ONCOLOGY | Facility: HOSPITAL | Age: 78
End: 2023-12-21
Attending: STUDENT IN AN ORGANIZED HEALTH CARE EDUCATION/TRAINING PROGRAM
Payer: MEDICARE

## 2023-12-21 ENCOUNTER — NURSE ONLY (OUTPATIENT)
Dept: HEMATOLOGY/ONCOLOGY | Facility: HOSPITAL | Age: 78
End: 2023-12-21
Attending: PHYSICIAN ASSISTANT
Payer: MEDICARE

## 2023-12-21 VITALS
SYSTOLIC BLOOD PRESSURE: 157 MMHG | HEART RATE: 72 BPM | OXYGEN SATURATION: 98 % | DIASTOLIC BLOOD PRESSURE: 74 MMHG | WEIGHT: 122.81 LBS | HEIGHT: 65 IN | TEMPERATURE: 98 F | BODY MASS INDEX: 20.46 KG/M2 | RESPIRATION RATE: 16 BRPM

## 2023-12-21 DIAGNOSIS — C25.9 ADENOCARCINOMA OF PANCREAS (HCC): Primary | ICD-10-CM

## 2023-12-21 DIAGNOSIS — E87.6 HYPOKALEMIA DUE TO INADEQUATE POTASSIUM INTAKE: ICD-10-CM

## 2023-12-21 DIAGNOSIS — Z51.11 CHEMOTHERAPY MANAGEMENT, ENCOUNTER FOR: ICD-10-CM

## 2023-12-21 DIAGNOSIS — T45.1X5A PERIPHERAL NEUROPATHY DUE TO CHEMOTHERAPY  (HCC): ICD-10-CM

## 2023-12-21 DIAGNOSIS — G62.0 PERIPHERAL NEUROPATHY DUE TO CHEMOTHERAPY  (HCC): ICD-10-CM

## 2023-12-21 DIAGNOSIS — R53.1 GENERALIZED WEAKNESS: ICD-10-CM

## 2023-12-21 LAB
ALBUMIN SERPL-MCNC: 4 G/DL (ref 3.2–4.8)
ALBUMIN/GLOB SERPL: 1.6 {RATIO} (ref 1–2)
ALP LIVER SERPL-CCNC: 135 U/L
ALT SERPL-CCNC: 8 U/L
ANION GAP SERPL CALC-SCNC: 11 MMOL/L (ref 0–18)
AST SERPL-CCNC: 18 U/L (ref ?–34)
BASOPHILS # BLD AUTO: 0.01 X10(3) UL (ref 0–0.2)
BASOPHILS NFR BLD AUTO: 0.3 %
BILIRUB SERPL-MCNC: 0.8 MG/DL (ref 0.2–1.1)
BUN BLD-MCNC: 9 MG/DL (ref 9–23)
BUN/CREAT SERPL: 11.5 (ref 10–20)
CALCIUM BLD-MCNC: 9.2 MG/DL (ref 8.7–10.4)
CANCER AG19-9 SERPL-ACNC: 6 U/ML (ref ?–35)
CHLORIDE SERPL-SCNC: 104 MMOL/L (ref 98–112)
CO2 SERPL-SCNC: 26 MMOL/L (ref 21–32)
CREAT BLD-MCNC: 0.78 MG/DL
DEPRECATED RDW RBC AUTO: 53.8 FL (ref 35.1–46.3)
EGFRCR SERPLBLD CKD-EPI 2021: 78 ML/MIN/1.73M2 (ref 60–?)
EOSINOPHIL # BLD AUTO: 0.02 X10(3) UL (ref 0–0.7)
EOSINOPHIL NFR BLD AUTO: 0.7 %
ERYTHROCYTE [DISTWIDTH] IN BLOOD BY AUTOMATED COUNT: 15.3 % (ref 11–15)
GLOBULIN PLAS-MCNC: 2.5 G/DL (ref 2.8–4.4)
GLUCOSE BLD-MCNC: 105 MG/DL (ref 70–99)
HCT VFR BLD AUTO: 35.1 %
HGB BLD-MCNC: 11.3 G/DL
IMM GRANULOCYTES # BLD AUTO: 0.01 X10(3) UL (ref 0–1)
IMM GRANULOCYTES NFR BLD: 0.3 %
LYMPHOCYTES # BLD AUTO: 0.72 X10(3) UL (ref 1–4)
LYMPHOCYTES NFR BLD AUTO: 23.5 %
MCH RBC QN AUTO: 30.6 PG (ref 26–34)
MCHC RBC AUTO-ENTMCNC: 32.2 G/DL (ref 31–37)
MCV RBC AUTO: 95.1 FL
MONOCYTES # BLD AUTO: 0.43 X10(3) UL (ref 0.1–1)
MONOCYTES NFR BLD AUTO: 14 %
NEUTROPHILS # BLD AUTO: 1.88 X10 (3) UL (ref 1.5–7.7)
NEUTROPHILS # BLD AUTO: 1.88 X10(3) UL (ref 1.5–7.7)
NEUTROPHILS NFR BLD AUTO: 61.2 %
OSMOLALITY SERPL CALC.SUM OF ELEC: 291 MOSM/KG (ref 275–295)
PLATELET # BLD AUTO: 149 10(3)UL (ref 150–450)
POTASSIUM SERPL-SCNC: 3.1 MMOL/L (ref 3.5–5.1)
PROT SERPL-MCNC: 6.5 G/DL (ref 5.7–8.2)
RBC # BLD AUTO: 3.69 X10(6)UL
SODIUM SERPL-SCNC: 141 MMOL/L (ref 136–145)
WBC # BLD AUTO: 3.1 X10(3) UL (ref 4–11)

## 2023-12-21 PROCEDURE — 96413 CHEMO IV INFUSION 1 HR: CPT

## 2023-12-21 PROCEDURE — 80053 COMPREHEN METABOLIC PANEL: CPT

## 2023-12-21 PROCEDURE — 96375 TX/PRO/DX INJ NEW DRUG ADDON: CPT

## 2023-12-21 PROCEDURE — 86301 IMMUNOASSAY TUMOR CA 19-9: CPT

## 2023-12-21 PROCEDURE — 85025 COMPLETE CBC W/AUTO DIFF WBC: CPT

## 2023-12-21 PROCEDURE — 99215 OFFICE O/P EST HI 40 MIN: CPT | Performed by: STUDENT IN AN ORGANIZED HEALTH CARE EDUCATION/TRAINING PROGRAM

## 2023-12-21 PROCEDURE — 96417 CHEMO IV INFUS EACH ADDL SEQ: CPT

## 2023-12-21 RX ORDER — GABAPENTIN 100 MG/1
100 CAPSULE ORAL NIGHTLY
Qty: 90 CAPSULE | Refills: 1 | Status: SHIPPED | OUTPATIENT
Start: 2023-12-21

## 2023-12-21 RX ORDER — HEPARIN SODIUM (PORCINE) LOCK FLUSH IV SOLN 100 UNIT/ML 100 UNIT/ML
SOLUTION INTRAVENOUS
Status: COMPLETED
Start: 2023-12-21 | End: 2023-12-21

## 2023-12-21 RX ORDER — HEPARIN SODIUM (PORCINE) LOCK FLUSH IV SOLN 100 UNIT/ML 100 UNIT/ML
5 SOLUTION INTRAVENOUS ONCE
OUTPATIENT
Start: 2023-12-21

## 2023-12-21 RX ORDER — HEPARIN SODIUM (PORCINE) LOCK FLUSH IV SOLN 100 UNIT/ML 100 UNIT/ML
5 SOLUTION INTRAVENOUS ONCE
Status: COMPLETED | OUTPATIENT
Start: 2023-12-21 | End: 2023-12-21

## 2023-12-21 RX ORDER — SODIUM CHLORIDE 9 MG/ML
10 INJECTION INTRAVENOUS ONCE
OUTPATIENT
Start: 2023-12-21

## 2023-12-21 RX ADMIN — HEPARIN SODIUM (PORCINE) LOCK FLUSH IV SOLN 100 UNIT/ML 500 UNITS: 100 SOLUTION INTRAVENOUS at 17:32:00

## 2023-12-21 NOTE — PROGRESS NOTES
Pt here for C15D1 Drug(s)ABRAXANE + GEMZAR. Arrives Ambulating independently, accompanied by Self     Patient was evaluated today by MD.    Oral medications included in this regimen:  no    Patient confirms comprehension of cancer treatment schedule:  yes    Pregnancy screening:  Not applicable    Modifications in dose or schedule:  No    Medications appearance and physical integrity checked by RN: yes. Chemotherapy IV pump settings verified by 2 RNs:  Yes.   Frequency of blood return and site check throughout administration: Prior to administration and At completion of therapy     Infusion/treatment outcome:  patient tolerated treatment without incident    Education Record    Learner:  Patient  Barriers / Limitations:  None  Method:  Discussion  Education / instructions given:  N/A  Outcome:  Shows understanding    Discharged Home, Ambulating independently, accompanied by:Self    Patient/family verbalized understanding of future appointments: by printed AVS

## 2024-01-01 ENCOUNTER — APPOINTMENT (OUTPATIENT)
Dept: HEMATOLOGY/ONCOLOGY | Facility: HOSPITAL | Age: 79
End: 2024-01-01
Attending: STUDENT IN AN ORGANIZED HEALTH CARE EDUCATION/TRAINING PROGRAM
Payer: MEDICARE

## 2024-01-01 ENCOUNTER — TELEPHONE (OUTPATIENT)
Dept: HEMATOLOGY/ONCOLOGY | Facility: HOSPITAL | Age: 79
End: 2024-01-01

## 2024-01-01 ENCOUNTER — TELEPHONE (OUTPATIENT)
Dept: INTERNAL MEDICINE CLINIC | Facility: CLINIC | Age: 79
End: 2024-01-01

## 2024-01-01 RX ORDER — POTASSIUM CHLORIDE 750 MG/1
20 TABLET, FILM COATED, EXTENDED RELEASE ORAL DAILY
Qty: 180 TABLET | Refills: 0 | Status: CANCELLED | OUTPATIENT
Start: 2024-01-01

## 2024-01-01 RX ORDER — GABAPENTIN 100 MG/1
100 CAPSULE ORAL NIGHTLY
Qty: 90 CAPSULE | Refills: 0 | Status: SHIPPED | OUTPATIENT
Start: 2024-01-01 | End: 2024-05-07

## 2024-01-06 ENCOUNTER — HOSPITAL ENCOUNTER (OUTPATIENT)
Dept: CT IMAGING | Facility: HOSPITAL | Age: 79
Discharge: HOME | End: 2024-01-06
Attending: STUDENT IN AN ORGANIZED HEALTH CARE EDUCATION/TRAINING PROGRAM
Payer: MEDICARE

## 2024-01-06 DIAGNOSIS — T45.1X5A PERIPHERAL NEUROPATHY DUE TO CHEMOTHERAPY  (HCC): ICD-10-CM

## 2024-01-06 DIAGNOSIS — C25.9 ADENOCARCINOMA OF PANCREAS (HCC): ICD-10-CM

## 2024-01-06 DIAGNOSIS — G62.0 PERIPHERAL NEUROPATHY DUE TO CHEMOTHERAPY  (HCC): ICD-10-CM

## 2024-01-06 DIAGNOSIS — Z51.11 CHEMOTHERAPY MANAGEMENT, ENCOUNTER FOR: ICD-10-CM

## 2024-01-06 PROCEDURE — 71260 CT THORAX DX C+: CPT | Performed by: STUDENT IN AN ORGANIZED HEALTH CARE EDUCATION/TRAINING PROGRAM

## 2024-01-06 PROCEDURE — 74177 CT ABD & PELVIS W/CONTRAST: CPT | Performed by: STUDENT IN AN ORGANIZED HEALTH CARE EDUCATION/TRAINING PROGRAM

## 2024-01-11 ENCOUNTER — OFFICE VISIT (OUTPATIENT)
Dept: HEMATOLOGY/ONCOLOGY | Facility: HOSPITAL | Age: 79
End: 2024-01-11
Attending: STUDENT IN AN ORGANIZED HEALTH CARE EDUCATION/TRAINING PROGRAM
Payer: MEDICARE

## 2024-01-11 VITALS
TEMPERATURE: 98 F | RESPIRATION RATE: 16 BRPM | WEIGHT: 117.63 LBS | DIASTOLIC BLOOD PRESSURE: 83 MMHG | BODY MASS INDEX: 19.6 KG/M2 | HEIGHT: 65 IN | HEART RATE: 83 BPM | SYSTOLIC BLOOD PRESSURE: 147 MMHG | OXYGEN SATURATION: 97 %

## 2024-01-11 DIAGNOSIS — Z51.11 CHEMOTHERAPY MANAGEMENT, ENCOUNTER FOR: ICD-10-CM

## 2024-01-11 DIAGNOSIS — C25.9 ADENOCARCINOMA OF PANCREAS (HCC): Primary | ICD-10-CM

## 2024-01-11 DIAGNOSIS — G62.0 PERIPHERAL NEUROPATHY DUE TO CHEMOTHERAPY  (HCC): ICD-10-CM

## 2024-01-11 DIAGNOSIS — T45.1X5A PERIPHERAL NEUROPATHY DUE TO CHEMOTHERAPY  (HCC): ICD-10-CM

## 2024-01-11 DIAGNOSIS — R53.1 GENERALIZED WEAKNESS: ICD-10-CM

## 2024-01-11 DIAGNOSIS — E87.6 HYPOKALEMIA DUE TO INADEQUATE POTASSIUM INTAKE: ICD-10-CM

## 2024-01-11 LAB
ALBUMIN SERPL-MCNC: 4.1 G/DL (ref 3.2–4.8)
ALBUMIN/GLOB SERPL: 1.5 {RATIO} (ref 1–2)
ALP LIVER SERPL-CCNC: 126 U/L
ALT SERPL-CCNC: <7 U/L
ANION GAP SERPL CALC-SCNC: 5 MMOL/L (ref 0–18)
AST SERPL-CCNC: 18 U/L (ref ?–34)
BASOPHILS # BLD AUTO: 0.02 X10(3) UL (ref 0–0.2)
BASOPHILS NFR BLD AUTO: 0.5 %
BILIRUB SERPL-MCNC: 0.6 MG/DL (ref 0.2–1.1)
BUN BLD-MCNC: 15 MG/DL (ref 9–23)
BUN/CREAT SERPL: 15.6 (ref 10–20)
CALCIUM BLD-MCNC: 9.8 MG/DL (ref 8.7–10.4)
CANCER AG19-9 SERPL-ACNC: 3.2 U/ML (ref ?–35)
CHLORIDE SERPL-SCNC: 106 MMOL/L (ref 98–112)
CO2 SERPL-SCNC: 26 MMOL/L (ref 21–32)
CREAT BLD-MCNC: 0.96 MG/DL
DEPRECATED RDW RBC AUTO: 50.6 FL (ref 35.1–46.3)
EGFRCR SERPLBLD CKD-EPI 2021: 60 ML/MIN/1.73M2 (ref 60–?)
EOSINOPHIL # BLD AUTO: 0.02 X10(3) UL (ref 0–0.7)
EOSINOPHIL NFR BLD AUTO: 0.5 %
ERYTHROCYTE [DISTWIDTH] IN BLOOD BY AUTOMATED COUNT: 14.8 % (ref 11–15)
GLOBULIN PLAS-MCNC: 2.8 G/DL (ref 2.8–4.4)
GLUCOSE BLD-MCNC: 153 MG/DL (ref 70–99)
HCT VFR BLD AUTO: 36.3 %
HGB BLD-MCNC: 12.3 G/DL
IMM GRANULOCYTES # BLD AUTO: 0.02 X10(3) UL (ref 0–1)
IMM GRANULOCYTES NFR BLD: 0.5 %
LYMPHOCYTES # BLD AUTO: 0.85 X10(3) UL (ref 1–4)
LYMPHOCYTES NFR BLD AUTO: 19.7 %
MCH RBC QN AUTO: 31.5 PG (ref 26–34)
MCHC RBC AUTO-ENTMCNC: 33.9 G/DL (ref 31–37)
MCV RBC AUTO: 93.1 FL
MONOCYTES # BLD AUTO: 0.52 X10(3) UL (ref 0.1–1)
MONOCYTES NFR BLD AUTO: 12 %
NEUTROPHILS # BLD AUTO: 2.89 X10 (3) UL (ref 1.5–7.7)
NEUTROPHILS # BLD AUTO: 2.89 X10(3) UL (ref 1.5–7.7)
NEUTROPHILS NFR BLD AUTO: 66.8 %
OSMOLALITY SERPL CALC.SUM OF ELEC: 288 MOSM/KG (ref 275–295)
PLATELET # BLD AUTO: 155 10(3)UL (ref 150–450)
POTASSIUM SERPL-SCNC: 3.5 MMOL/L (ref 3.5–5.1)
PROT SERPL-MCNC: 6.9 G/DL (ref 5.7–8.2)
RBC # BLD AUTO: 3.9 X10(6)UL
SODIUM SERPL-SCNC: 137 MMOL/L (ref 136–145)
WBC # BLD AUTO: 4.3 X10(3) UL (ref 4–11)

## 2024-01-11 PROCEDURE — 80053 COMPREHEN METABOLIC PANEL: CPT

## 2024-01-11 PROCEDURE — 85025 COMPLETE CBC W/AUTO DIFF WBC: CPT

## 2024-01-11 PROCEDURE — 96413 CHEMO IV INFUSION 1 HR: CPT

## 2024-01-11 PROCEDURE — 96375 TX/PRO/DX INJ NEW DRUG ADDON: CPT

## 2024-01-11 PROCEDURE — 99215 OFFICE O/P EST HI 40 MIN: CPT | Performed by: STUDENT IN AN ORGANIZED HEALTH CARE EDUCATION/TRAINING PROGRAM

## 2024-01-11 PROCEDURE — 86301 IMMUNOASSAY TUMOR CA 19-9: CPT

## 2024-01-11 PROCEDURE — 96417 CHEMO IV INFUS EACH ADDL SEQ: CPT

## 2024-01-11 RX ORDER — HEPARIN SODIUM (PORCINE) LOCK FLUSH IV SOLN 100 UNIT/ML 100 UNIT/ML
SOLUTION INTRAVENOUS
Status: COMPLETED
Start: 2024-01-11 | End: 2024-01-11

## 2024-01-11 RX ORDER — HEPARIN SODIUM (PORCINE) LOCK FLUSH IV SOLN 100 UNIT/ML 100 UNIT/ML
5 SOLUTION INTRAVENOUS ONCE
OUTPATIENT
Start: 2024-01-11

## 2024-01-11 RX ORDER — SODIUM CHLORIDE 9 MG/ML
10 INJECTION INTRAVENOUS ONCE
OUTPATIENT
Start: 2024-01-11

## 2024-01-11 RX ORDER — HEPARIN SODIUM (PORCINE) LOCK FLUSH IV SOLN 100 UNIT/ML 100 UNIT/ML
5 SOLUTION INTRAVENOUS ONCE
Status: COMPLETED | OUTPATIENT
Start: 2024-01-11 | End: 2024-01-11

## 2024-01-11 RX ADMIN — HEPARIN SODIUM (PORCINE) LOCK FLUSH IV SOLN 100 UNIT/ML 500 UNITS: 100 SOLUTION INTRAVENOUS at 17:45:00

## 2024-01-11 NOTE — PROGRESS NOTES
Pt here for C16D1 Abraxane/Gemzar.  Arrives Ambulating independently, accompanied by Self     Patient was evaluated today by MD.    Oral medications included in this regimen:  no    Patient confirms comprehension of cancer treatment schedule:  yes    Pregnancy screening:  Not applicable    Modifications in dose or schedule:  No    Medications appearance and physical integrity checked by RN: yes.    Chemotherapy IV pump settings verified by 2 RNs:  Yes.  Frequency of blood return and site check throughout administration: Prior to administration, Prior to each drug, and At completion of therapy     Infusion/treatment outcome:  patient tolerated treatment without incident    Education Record    Learner:  Patient  Barriers / Limitations:  None  Method:  Brief focused and Discussion  Education / instructions given:  Plan of care for treatment  Outcome:  Shows understanding    Discharged Home, Ambulating independently, accompanied by:Self and Family member    Patient/family verbalized understanding of future appointments: by printed AVS

## 2024-01-12 NOTE — PROGRESS NOTES
Rajwinder KINGSTON [facility]  Oncology Progress Note    Patient Name: Lucina Manzanares   YOB: 1945   Medical Record Number: [id number]    Chief Complaint: metastatic pancreatic cancer    Cancer Staging  Adenocarcinoma of pancreas (HCC)  Staging form: Exocrine Pancreas, AJCC 8th Edition  - Clinical stage from 1/25/2023: Stage IV (cTX, cNX, pM1) - Signed by Bartolo Dawkins DO on 1/30/2023    Oncology History Overview Note   Diagnosis: Metastatic MSI-S adenocarcinoma of the pancreas  Date of Dx: 1/25/23  Molecular: MSI-S  -somatic KRAS p.G12R, TP53, SMAD4  -germline TERT heterozygous VUS  Treatments:  -Gemcitabine, Abraxane    1/17/23: Initial medical oncology appointment. Briefly, the patient developed progressive left-sided abdominal pain in late November and was referred to gastroenterology (Dr. Hamm) who pursued imaging with a CT a/p noncontrast which revealed multiple hypoattenuating hepatic lesions, a solid and cystic pancreatic tail mass extending to the splenic hilum and splenomegaly with splenic vein occlusion.  Referred to medical oncology for further care. Discussed concern for pancreatic cancer, plan for CT c/a/p with contrast and IR biopsy.     1/21/23: CT c/a/p pancreatic tail mass with splenic vein occlusion, multiple liver metastases, bilateral lung nodules concerning for metastases and left upper quadrant omental nodularity suspicious for early carcinomatosis.     1/25/23: IR liver biopsy, PATH: MSI-S moderately differentiated adenocarcinoma of the pancreas. The tumor cells are positive for keratin AE1/AE3, CK7, CK20, , and CDX2, but are negative for GATA3 and TTF-1.    1/30/23: med onc followup. Discussed metastatic nature of disease, prognosis, palliative intent of therapy. Pt fit for gemcitabine/abraxane. Will start with dose-reduction given goals of care.   -2/6, port placement    2/9/23: C1D1 [place]/Abraxane  -2/16, tolerating well, cleared for D8, RTC 2 weeks. Treated for  strep pharyngitis.   3/23/23: tolerating well, cleared for C#3.     5/4/23: restaging CT shows partial response to treatment. Pt feeling weaker from chemo, continues to lose weight. Hold tx 1 week, reassess.     5/11/23: pt feeling stronger, gained 2-3 lbs. Will restart dose-reduced gem/abraxane, plan for chemo q2 weeks.     6/15/23: pt opted for q3 week dosing of [place]/Abraxane. Does very well during week 3. No pain, labs and PS adequate to continue therapy, continue q3 week dosing.     7/27/23: restaging CT c/a/p shows continued treatment response. Continue dose-reduced [place]/Abraxane every 3 weeks.     11/4/23: slightly growth to 1 hepatic lesion, continue chemotherapy, restage in 2 months.     1/11/24: restaging CT shows enlarging hepatic metastasis, but other areas of disease are stable. We will continue chemotherapy and plan for radiation to the hepatic metastasis.      Adenocarcinoma of pancreas (HCC)   1/25/2023 Cancer Staged    Staging form: Exocrine Pancreas, AJCC 8th Edition  - Clinical stage from 1/25/2023: Stage IV (cTX, cNX, pM1)     1/30/2023 Initial Diagnosis    Adenocarcinoma of pancreas (HCC)     2/9/2023 -  Chemotherapy    OP Albumin-bound PACLitaxel / Gemcitabine  Plan Provider: Bartolo Dawkins DO  Treatment goal: Palliative  Line of treatment: [No plan line of treatment]       Subjective:   Lucina Manzanares is a 78 year old female with a hx of HTN, asthma, and fibromyalgia who presents to medical oncology regarding metastatic MSI-S adenocarcinoma of the pancreas on frontline gemcitabine Abraxane as detailed above.  He has developed compiling toxicities mainly fatigue poor appetite and dysgeusia and so she is receiving dose reduced gemcitabine Abraxane every 3 weeks.    Lost some weight bc of a decreased mood due to the death of a family friend. Mild hip discomfort, no major complaints.     Review of Systems:  Hematology/Oncology ROS performed and negative except as above in HPI    History/Other:    Current Medications:  No outpatient medications have been marked as taking for the 1/11/24 encounter (Office Visit) with Bartolo Dawkins DO.     Allergies:   Allergies   Allergen Reactions    Ciprofloxacin CORNELIO     Also got a headache    Cortisone      Other reaction(s): Rash       Objective:   Blood pressure 147/83, pulse 83, temperature 98.1 °F (36.7 °C), temperature source Oral, resp. rate 16, height 1.651 m (5' 5\"), weight 53.3 kg (117 lb 9.6 oz), SpO2 97%.  Physical Exam:  ECOG PS: 1  General: A&Ox3, NAD  HEENT: EOMsi, Anicteric, OP clear  CV: RRR  Pulm: normal effort, CTA  Abd: soft, ntnd, bs+  Extremities: no edema  Neurological: grossly intact, normal sensation to light touch on fingers.   Derm:  Dry skin noted on legs and back.  Excoriations noted.  Pin point scabs on BLE.  Similar rash on forearms but quantity is sparse.  No signs of infection, erythema, vesicles or open wounds.      Results:   Labs:  Lab Results   Component Value Date    WBC 4.3 01/11/2024    HGB 12.3 01/11/2024    HCT 36.3 01/11/2024    .0 01/11/2024    CREATSERUM 0.96 01/11/2024    BUN 15 01/11/2024     01/11/2024    K 3.5 01/11/2024     01/11/2024    CO2 26.0 01/11/2024     (H) 01/11/2024    CA 9.8 01/11/2024    ALB 4.1 01/11/2024    ALKPHO 126 01/11/2024    BILT 0.6 01/11/2024    TP 6.9 01/11/2024    AST 18 01/11/2024    ALT <7 (L) 01/11/2024    INR 1.19 (H) 01/17/2023    TSH 1.18 08/10/2015    CRP <0.5 07/03/2016     Imaging:      Pathology:  Liver; core biopsy:   Moderately differentiated adenocarcinoma (see comment). MSI stable.   Comment:  Patient's recent CT of the chest, abdomen, and pelvis (1/21/23) is reported to demonstrate a 4.5 cm pancreatic tail mass which occludes the left splenic vein, multiple liver metastases, bilateral lung nodules, and left upper quadrant omental nodularity.  Sections of the liver biopsy demonstrate extensive involvement by moderately differentiated adenocarcinoma.   The tumor cells are positive for keratin AE1/AE3, CK7, CK20, , and CDX2, but are negative for GATA3 and TTF-1.    Assessment & Plan:   Lucina Manzanares is a 78 year old female with a hx of HTN, asthma, and fibromyalgia who presents for medical oncology follow-up regarding metastatic MSI-S adenocarcinoma of the pancreas on frontline gemcitabine and abraxane.     # MSI-S moderately differentiated adenocarcinoma of the pancreas.   -began FDR Comal and dose-reduced Abraxane D1/8 q21 days on 2/9/23, with restaging in July showing a continued partial response and restaging in November 2023 shwoed very mild oligoprogression, specifically to 1 lesion in the liver and one small pulmonary nodule.  -restaging CT c/a/p this visit shows continued enlargement of hepatic metastasis, though other sites of disease are stable, thus we will refer to Rad Onc to consider radiation to the oligoprogressive liver metastasis.   -continue Abraxane 75 mg/m2 with FDR Gemcitabine 600mg/m2/60min  -RTC in 3 weeks    # Supportive Care, pain management   -moderate cancer related pain initially, currently improved and patient no longer having pain  -anxiety, Lexapro 5mg daily, pt also has ativan 0.5mg q12hr prn  -pruritus, etiology unclear, likely dry skin, prn montelukast 10 mg at bedtime  -CIPN with secondary insomnia, gabapentin 100mg nightly     # Comorbidities.  -HTN, losartan  -asthma, albuterol, fluticasone  -HLD, simvastatin  -GERD, lansoprazole    # Emotional well being  -discussed today, the patient is aware of support systems available through the Cancer Center, currently no concerns or issues. The diagnosis, prognosis, treatment goals, plan for treatment, and anticipated response were explained to the patient.   -Advanced Care Planning: not discussed today    MDM High: malignancy; review of results with independent interpretation and discussion of management; drug therapy requiring intensive monitoring for toxicity    Bartolo Dawkins,  DO    [facility]  Rajwinder KINGSTON [facility]    This note was created using a voice-recognition transcribing system. Incorrect words or phrases may have been missed during proofreading. Please interpret accordingly.

## 2024-01-18 ENCOUNTER — OFFICE VISIT (OUTPATIENT)
Dept: RADIATION ONCOLOGY | Facility: HOSPITAL | Age: 79
End: 2024-01-18
Attending: RADIOLOGY
Payer: MEDICARE

## 2024-01-18 VITALS
WEIGHT: 114.38 LBS | SYSTOLIC BLOOD PRESSURE: 150 MMHG | RESPIRATION RATE: 16 BRPM | HEART RATE: 92 BPM | OXYGEN SATURATION: 100 % | TEMPERATURE: 97 F | BODY MASS INDEX: 19 KG/M2 | DIASTOLIC BLOOD PRESSURE: 85 MMHG

## 2024-01-18 PROCEDURE — 99212 OFFICE O/P EST SF 10 MIN: CPT

## 2024-01-18 NOTE — PROGRESS NOTES
Nursing Consultation Note  Patient: Lucina Manzanares  YOB: 1945  Age: 78 year old  Radiation Oncologist: Dr. Haim Reynolds  Referring Physician: Bartolo Dawkins  Diagnosis:[unfilled]  Consult Date: 1/18/2024      Chemotherapy: No  Labs: Reviewed  Imaging: Reviewed  Is the patient of child-bearing age?         No  Has the patient received radiation therapy in the past? no  Does the patient have an implantable device?No -  on R chest  Patient has/has had:     1. Assistive Devices: Cane    2. Flu Vaccination: yes    3. Pneumonia Vaccination:  yes    Vital Signs:   Vitals:    01/18/24 1234   BP: 150/85   Pulse: 92   Resp: 16   Temp: 97.4 °F (36.3 °C)   ,   Wt Readings from Last 6 Encounters:   01/18/24 51.9 kg (114 lb 6.4 oz)   01/11/24 53.3 kg (117 lb 9.6 oz)   12/21/23 55.7 kg (122 lb 12.8 oz)   11/30/23 56.1 kg (123 lb 9.6 oz)   11/09/23 56.2 kg (124 lb)   10/19/23 56.9 kg (125 lb 8 oz)       Nursing Note:      Review of Systems   Constitutional:  Positive for appetite change and fatigue.   HENT: Negative.     Eyes:  Positive for visual disturbance.        Intermittent blurred vision. Dry eyes   Respiratory: Negative.     Cardiovascular: Negative.    Gastrointestinal: Negative.    Endocrine: Negative.    Genitourinary:  Positive for frequency.   Musculoskeletal: Negative.    Skin: Negative.    Allergic/Immunologic: Negative.    Neurological:  Positive for weakness and headaches.        Hx of migraines    Hematological: Negative.    Psychiatric/Behavioral: Negative.            Allergies:  Allergies   Allergen Reactions    Ciprofloxacin JITTERY     Also got a headache    Cortisone      Other reaction(s): Rash       Current Outpatient Medications   Medication Sig Dispense Refill    gabapentin 100 MG Oral Cap Take 1 capsule (100 mg total) by mouth nightly. 90 capsule 1    montelukast 10 MG Oral Tab Take 1 tablet (10 mg total) by mouth nightly. 90 tablet 1    lansoprazole 30 MG Oral Capsule Delayed Release  Take 1 capsule (30 mg total) by mouth before breakfast. 90 capsule 3    simvastatin 40 MG Oral Tab Take 1 tablet (40 mg total) by mouth every evening. 90 tablet 3    losartan 50 MG Oral Tab Take 1 tablet (50 mg total) by mouth daily. 90 tablet 3    Potassium Chloride ER 10 MEQ Oral Tab CR Take 2 tablets (20 mEq total) by mouth daily. For 1 week then as directed by provider 60 tablet 0    escitalopram 5 MG Oral Tab Take 1 tablet (5 mg total) by mouth daily. 30 tablet 1    lidocaine-prilocaine 2.5-2.5 % External Cream Apply to site 1 hour prior to port a cath needle insertion 30 g 1    ondansetron (ZOFRAN) 8 MG tablet Take 1 tablet (8 mg total) by mouth every 8 (eight) hours as needed for Nausea. (Patient not taking: Reported on 11/9/2023) 30 tablet 3    prochlorperazine (COMPAZINE) 10 mg tablet Take 1 tablet (10 mg total) by mouth every 8 (eight) hours as needed for Nausea. (Patient not taking: Reported on 11/9/2023) 30 tablet 3    traMADol 50 MG Oral Tab Take 1-2 tablets ( mg total) by mouth every 6 (six) hours as needed for Pain. (Patient not taking: Reported on 11/9/2023) 90 tablet 0    LORazepam 0.5 MG Oral Tab Take 1 tablet (0.5 mg total) by mouth every 12 (twelve) hours as needed for Anxiety. 60 tablet 0    Fluticasone Propionate 50 MCG/ACT Nasal Suspension 2 sprays by Nasal route daily. (Patient taking differently: 2 sprays by Nasal route as needed.) 48 g 3    Albuterol Sulfate HFA (PROAIR HFA) 108 (90 Base) MCG/ACT Inhalation Aero Soln Inhale 1 puff into the lungs every 6 (six) hours as needed for Wheezing. 24 g 1       Preferred Pharmacy:    [id number] - [place], [place] - [address] AT [facility] [address], [phone], [phone]  [address]  [facility] [address]  Phone: [phone] Fax: [phone]    [facility] ([facility]) - [address] [facility] [phone], [phone]  [facility]  [place] [address]  Phone: [phone] Fax: [phone]    [facility] - [place], [place] - [address] [place] [address] [phone], [phone]  [address]  [facility] [address]  [facility] [address]  Phone: [phone] Fax: [phone]      Past Medical History:   Diagnosis Date    Asthma     Blepharitis 2008    OU    Cataract 2008    OU    Choroidal nevus 2008    OS    Corneal foreign body 2008    OD, removed in office    Dry eyes 2008    OU, Schirmers 15mm/13mm    GERD (gastroesophageal reflux disease)     Headache     Hiatal hernia     High cholesterol     Marginal ulcer 2008    Staph marginal ulcer @ 2 oclock, OS, Dx'd 8-18-08    Migraines     Tinnitus     Vertigo        Past Surgical History:   Procedure Laterality Date    APPENDECTOMY      COLONOSCOPY      FOOT SURGERY      bunionectomy    HERNIA SURGERY      hernia repair    HIP REPLACEMENT SURGERY  2010    INJ TENDON/LIGAMENT/CYST      Injection Tendon Sheath, Ligament, SOCRATES A FRANSISCA    TONSILLECTOMY         Social History     Socioeconomic History    Marital status:      Spouse name: Not on file    Number of children: Not on file    Years of education: Not on file    Highest education level: Not on file   Occupational History    Not on file   Tobacco Use    Smoking status: Former    Smokeless tobacco: Never   Substance and Sexual Activity    Alcohol use: Not Currently     Comment: occasionally    Drug use: Never    Sexual activity: Not on file   Other Topics Concern     Service Not Asked    Blood Transfusions Not Asked    Caffeine Concern Yes     Comment: coffee, tea, 4 cups daily    Occupational Exposure Not Asked    Hobby Hazards Not Asked    Sleep Concern Not Asked    Stress Concern Not Asked    Weight Concern Not Asked    Special Diet Not Asked    Back Care Not Asked    Exercise Not Asked    Bike Helmet Not Asked    Seat Belt Not Asked    Self-Exams Not Asked   Social History Narrative    Not on file     Social Determinants of  Health     Financial Resource Strain: Not on file   Food Insecurity: Not on file   Transportation Needs: Not on file   Physical Activity: Not on file   Stress: Not on file   Social Connections: Not on file   Housing Stability: Not on file       ECOG:  Grade 1 - No physically strenuous activity, but ambulatory and able to carry out light and sedentary work (e.g. office work, light house work).    Education:  Yes    Are ADL's met?  Yes  Does patient feel safe in their environment?  Yes  Care decisions:  Patient and/or surrogate IS involved in care decisions.  Advanced directives:  Patient HAS advnaced directives and a copy has been requested.  Transportation:  Transportation problems exist and will require assistance    Pain:   ;Pain Score: 0   ;    ;       Primary language:  English  Language line required?  no  Comprehension Ability:  good  Able to read?  yes  Able to write?  yes  Communication tools:   none  Patient's ability to learn:  good  Readiness to learn:  Motivated  Learning preferences:  Discussion and Handout  Barriers to learning:  None  Interventions to reduce barriers:  Consult, Face patient when speaking, Provide support/encouragement, Provide printed materials, and Patient to express feelings  Visual aids:  yes  Hearing disability:  no  Dentures:  no    Knowledge Deficit Plan Of Care:    Problem:  Knowledge Deficit    Problems related to:    Radiation therapy    Interventions:  Assess patient knowledge level  Assess knowledge needs  Instruct on the disease process  Instruct on treatment planning  Instruct on radiation therapy appointment scheduling  Instruct on purpose of radiation therapy  Instruct on side effects of radiation therapy    Expected Outcomes:  Knowledge of radiation therapy  Knowledge of side effects of radiation therapy and management  Comfortable with knowledge level    Progress Toward Outcome:  Making progress    Pamphlets/Handouts Given to Patient:  Radiation Process Overview        Fatigue Plan Of Care:    Problem:  Fatigue    Problems related to:    Disease process  Side effect of therapy    Interventions:  Identify barriers to adequate sleep  Discuss how to improve sleep pattern  Prioritize daily activities  Discuss methods to balance rest and activity  Discuss possible sleep aids  Promote excercise within patient's capability  Encourage adequate nutrition and hydration  Discuss coping strategies    Expected Outcomes:  Patient verbalizes increased energy level  Patient exhibits fatigue management strategies  Patient employs good coping strategies    Progress Toward Outcome:  Making progress    Pamphlets/Handouts Given to Patient:  Radiation therapy symptom management for pancreatic  Site specific side effects pancreatic    Pt seen for consultation with Dr. Reynolds. I explained to the patient that today she would meet Dr. Reynolds but while being treated there was a possibility that she might also encounter the physicians who cover for Dr. Reynolds which are Dr. Hoff, Dr. Mcgregor, and Dr. Juan Sifuentes. VSS, pt denies pain currently. Pt educated on radiation process, as well as possible side effects. RT consent not signed. Task not sent for aly. Dr. Reynolds to email regarding SBRT insurance approval. RN will call pt with update. Given RN number in case of radiation questio/concerns.

## 2024-01-18 NOTE — PATIENT INSTRUCTIONS
Call [phone] to reach a radiation nurse.    We will contact you regarding radiation mapping and treatment approval.

## 2024-01-18 NOTE — CONSULTS
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  Pt had 4 liver mets on presentation, and non-specific pulm nodules which on f/u scanning appeared to be chronic scars.       PATH: MSI-S moderately differentiated adenocarcinoma of the pancreas.   Pt has had q3 week Schley/Abraxane with continued treatment response with slightly growth in 1 hepatic lesion, as below, to 3.6cm.  Pt continues on further systemic tx.            CONCLUSION:     3.6 cm hepatic metastasis has increased in size since 11/4/2023.     1.5 cm hypodense mass within the pancreatic tail has decreased in size.     Sclerotic metastasis within the L1 is unchanged.     Multiple bilateral pulmonary nodules are unchanged since the prior exams and suggestive of chronic scar.  These can be assessed for stability on subsequent follow-up surveillance imaging.     1.2 cm complex left interpolar renal lesion is indeterminate.  Findings may be secondary to a hemorrhagic or proteinaceous cyst. Nonemergent renal MRI (or triphasic CT if patient cannot tolerate MRI) is recommended to further evaluate.       No new area of metastasis within the chest, abdomen or pelvis.     Multiple other incidental findings as described in the body of the report.           Finalized by (CST): Jasson Luna MD on 1/08/2024 at 7:15 AM          Oncology Chemo Meds          Cycle 12; Day 1    10/19/2023 Cycle 13; Day 1    11/9/2023 Cycle 14; Day 1    11/30/2023 Cycle 15; Day 1     12/21/2023 Cycle 16; Day 1    1/11/2024 Cycle 17; [ Day 1 ]    2/8/2024   Oncology Flowsheet   Day, Cycle Day 1, Cycle 12 Day 1, Cycle 13 Day 1, Cycle 14 Day 1, Cycle 15 Day 1, Cycle 16 [ Day 1, Cycle 17 ]   dexAMETHasone 100 MG/10ML (Decadron) IV New Bag (unknown dose, 20 mg ordered) New Bag (unknown dose, 20 mg ordered) New Bag (unknown dose, 20 mg ordered) New Bag (unknown dose, 20 mg ordered) New Bag (unknown dose, 20 mg ordered) [ 20 mg ]   gemcitabine 2 g/52.6mL (Gemzar)  mg/m2 = 988 mg 600 mg/m2 = 988 mg 600 mg/m2 = 988 mg 600 mg/m2 = 950 mg 600 mg/m2 = 950 mg [ 600 mg/m2 ] = 950 mg   PACLitaxel protein bound particles (Abraxane) IV 80 mg/m2 = 130 mg       + blood 80 mg/m2 = 130 mg 75 mg/m2 = 120 mg 75 mg/m2 = 120 mg 75 mg/m2 = 120 mg [ 75 mg/m2 ] = 120 mg   palonosetron 0.25 MG/5ML (Aloxi) IV New Bag (unknown dose, 0.25 mg ordered) New Bag (unknown dose, 0.25 mg ordered) New Bag (unknown dose, 0.25 mg ordered) New Bag (unknown dose, 0.25 mg ordered) New Bag (unknown dose, 0.25 mg ordered) [ 0.25 mg ]   sodium chloride 0.9%  mL    New Bag (unknown dose, 100 mL ordered) 250 mL    New Bag (unknown dose, 100 mL ordered) 250 mL    New Bag (unknown dose, 100 mL ordered) 250 mL    New Bag (unknown dose, 100 mL ordered) 250 mL    New Bag (unknown dose, 100 mL ordered) [ 250 mL ]    [ 100 mL ]      Details          [ Planned dose ]    Administered dose cannot be determined                Recent Labs   Lab 01/11/24  1306   WBC 4.3   HGB 12.3   .0   NE 2.89       ROS    A 12 Point review of system was obtained and is as above and per HPI and nursing note.    Review of Systems   Constitutional:  Positive for appetite change and fatigue.   HENT: Negative.     Eyes:  Positive for visual disturbance.        Intermittent blurred vision. Dry eyes   Respiratory: Negative.     Cardiovascular: Negative.    Gastrointestinal: Negative.    Endocrine: Negative.    Genitourinary:  Positive for frequency.    Musculoskeletal: Negative.    Skin: Negative.    Allergic/Immunologic: Negative.    Neurological:  Positive for weakness and headaches.        Hx of migraines    Hematological: Negative.    Psychiatric/Behavioral: Negative.           Current Outpatient Medications   Medication Sig Dispense Refill    gabapentin 100 MG Oral Cap Take 1 capsule (100 mg total) by mouth nightly. 90 capsule 1    montelukast 10 MG Oral Tab Take 1 tablet (10 mg total) by mouth nightly. 90 tablet 1    lansoprazole 30 MG Oral Capsule Delayed Release Take 1 capsule (30 mg total) by mouth before breakfast. 90 capsule 3    simvastatin 40 MG Oral Tab Take 1 tablet (40 mg total) by mouth every evening. 90 tablet 3    losartan 50 MG Oral Tab Take 1 tablet (50 mg total) by mouth daily. 90 tablet 3    Potassium Chloride ER 10 MEQ Oral Tab CR Take 2 tablets (20 mEq total) by mouth daily. For 1 week then as directed by provider 60 tablet 0    escitalopram 5 MG Oral Tab Take 1 tablet (5 mg total) by mouth daily. 30 tablet 1    lidocaine-prilocaine 2.5-2.5 % External Cream Apply to site 1 hour prior to port a cath needle insertion 30 g 1    ondansetron (ZOFRAN) 8 MG tablet Take 1 tablet (8 mg total) by mouth every 8 (eight) hours as needed for Nausea. (Patient not taking: Reported on 11/9/2023) 30 tablet 3    prochlorperazine (COMPAZINE) 10 mg tablet Take 1 tablet (10 mg total) by mouth every 8 (eight) hours as needed for Nausea. (Patient not taking: Reported on 11/9/2023) 30 tablet 3    traMADol 50 MG Oral Tab Take 1-2 tablets ( mg total) by mouth every 6 (six) hours as needed for Pain. (Patient not taking: Reported on 11/9/2023) 90 tablet 0    LORazepam 0.5 MG Oral Tab Take 1 tablet (0.5 mg total) by mouth every 12 (twelve) hours as needed for Anxiety. 60 tablet 0    Fluticasone Propionate 50 MCG/ACT Nasal Suspension 2 sprays by Nasal route daily. (Patient taking differently: 2 sprays by Nasal route as needed.) 48 g 3    Albuterol Sulfate HFA  (PROAIR HFA) 108 (90 Base) MCG/ACT Inhalation Aero Soln Inhale 1 puff into the lungs every 6 (six) hours as needed for Wheezing. 24 g 1       PAIN:   , Pain Score: 0,     ,  ,     ,  ,      ALLERGIES :   Allergies   Allergen Reactions    Ciprofloxacin JITTERY     Also got a headache    Cortisone      Other reaction(s): Rash       PAST MEDICAL HISTORY:   has a past medical history of Asthma, Blepharitis (2008), Cataract (2008), Choroidal nevus (2008), Corneal foreign body (2008), Dry eyes (2008), GERD (gastroesophageal reflux disease), Headache, Hiatal hernia, High cholesterol, Marginal ulcer (2008), Migraines, Tinnitus, and Vertigo.    PAST SURGICAL HISTORY:   has a past surgical history that includes appendectomy; hernia surgery (hernia repair); tonsillectomy; foot surgery (bunionectomy); inj tendon/ligament/cyst (Injection Tendon Sheath, Ligament, SOCRATES A FRANSISCA); hip replacement surgery (2010); and colonoscopy.    PAST SOCIAL HISTORY   reports that she has quit smoking. She has never used smokeless tobacco. She reports that she does not currently use alcohol. She reports that she does not use drugs.    PAST FAMILY HISTORY   family history includes Allergies in an other family member; Diabetes in an other family member; Glaucoma in her mother; Heart Disease in an other family member; Migraines in an other family member; Obesity in her mother; Stroke in an other family member; Thyroid Disorder in her brother.    Wt Readings from Last 6 Encounters:   01/18/24 51.9 kg (114 lb 6.4 oz)   01/11/24 53.3 kg (117 lb 9.6 oz)   12/21/23 55.7 kg (122 lb 12.8 oz)   11/30/23 56.1 kg (123 lb 9.6 oz)   11/09/23 56.2 kg (124 lb)   10/19/23 56.9 kg (125 lb 8 oz)        PHYSICAL EXAM  Blood pressure 150/85, pulse 92, temperature 97.4 °F (36.3 °C), temperature source Temporal, resp. rate 16, weight 51.9 kg (114 lb 6.4 oz), SpO2 100%.  PERFORMANCE STATUS 0-1 , LBP 3/10  PAIN  GENERAL:  Pt is alert and oriented times three in no acute  distress, thin.     HEENT:  PERRLA, EOMI,   NECK:  Supple with no  lymphadenopathy.   CHEST:  Clear   ABDOMEN:  Soft with no masses.   EXTREMITIES:  There is no upper or lower extremity edema.    NEUROLOGIC:  Cranial nerves II-XII are intact.  Neuro exam is nonfocal.    COMPLETED TESTS:  I have reviewed the patient's clinical, radiographic, pathologic and laboratory studies.    ASSESSMENT/PLAN    79 yo with oligo-metastatic pancreatic cancer to the liver, currently receiving systemic tx   Overall, pt has responded to [place]/Abraxane with continued treatment response with slightly growth in 1 hepatic lesion, as below, to 3.6cm.    Pt continues on further systemic tx and remains functional.  I have discussed the role of XRT, specially role of SBRT in oligometastasis and reviewed the literature.    I also discussed recent phase II RCT in pancreatic pt pts with mPFS benefit of 10.3 vs 2.5 months with metastasis directed therapy in addition to systemic tx,  [facility]CO GI 2024 (Extend trial, MD William, Gianna et al)     I explained various forms of XRT, specifically the differences between conventional 3D XRT vs SBRT, which allows higher ablative and conformal doses, which is not possible with 3D CRT.     I have also reviewed the published and established literature in detail (SOURAV Walsh Clinical Oncology 2013, Buddybaum  Nature Clinical Oncology, 2011, Tree, Lancet Oncology 2013,  Rafi, Oncologist 2012, MICHELLE Joel 2012, Cancer 2008).   I also explained that treatment is also supported by the American Society of Therapeutic Radiation Oncology (FANG) Stereotactic Body Radiation Therapy  (SBRT) Model policy, which states treatment would be indicated for patients with \"limited metastatic disease and good performance status is treated with the intention of eradicating all known active disease or greatly reducing the total disease burden in a manner that can extend progression-free survival.\"     I have explained the  diagnosis, stage, natural history of the disease and the goals of treatment.   The rational, alternatives and all risk were discussed and all questions were answered.    Thank you for allowing me to take care of your patient.  Please do not hesitate to contact me directly.    Haim Reynolds MD, FACRO  Radiation Oncology  [email or web address]  [phone]    1/18/2024      Narrative   PROCEDURE: CT CHEST ABDOMEN PELVIS (ALL CONTRAST ONLY) (CPT=71260/51479)     COMPARISON: [facility], CT ABDOMEN + PELVIS KIDNEYSTONE 2D RNDR (NO IV NO ORAL) (CPT=741, 1/15/2023, 10:42 AM.   KIDNEY RENAL, 6/29/2016, 2:02 PM.  [facility], CT CHEST+ABDOMEN+PELVIS(ALL CNTRST   ONLY)(OQF=89306/54643), 11/04/2023, 10:40 AM.  [facility], CT ABD (ATTN PANCREAS) W/WO+CHEST W/+PELVIS W/(CPT=71260,33670), 7/25/2023, 2:45 PM.     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  Automated exposure control for dose reduction was used. Adjustment of the mA and/or kV was done based on the patient's size. Use of iterative  reconstruction technique for dose reduction was used.  Dose information is transmitted to the ACR (American College of Radiology) NRDR (National Radiology Data Registry) which includes the Dose Index Registry.     FINDINGS:  LINES AND TUBES: There is a right sided port catheter with tip at the mid SVC..     MEDIASTINUM/VASCULATURE: There are multiple mildly prominent mediastinal lymph nodes which are nonspecific and measure less than 1 cm in short axis. There is atherosclerotic calcification of the aortic arch and thoracic aorta. The thoracic aorta is  otherwise unremarkable and not dilated. Mediastinal fat planes are preserved.  Cardiac chambers are unremarkable. Pericardium is normal. There are coronary artery calcifications. The main pulmonary artery has a normal diameter and is otherwise  unremarkable.     LUNGS AND PLEURA: There is biapical scarring and pleural thickening.  1 x 0.7 cm nodule within the anterior aspect of the left upper lobe is again seen and unchanged which abuts the adjacent pleura and likely represents scar.  0.8 cm nodule abutting the  right minor fissure is also unchanged.  0.6 cm subpleural nodule within the right lower lobe is unchanged. There is bibasilar and lingular atelectasis and scarring. No pneumothorax.  No consolidation.  No pleural effusion. The trachea and central airways   are unremarkable.  0.6 cm subpleural nodule within the left lower lobe (series 3, image 72) is unchanged.     CHEST WALL/BONES: There is degenerative disease of the thoracic spine. The chest wall and osseous structures are otherwise unremarkable.     LIVER: Again visualized is a hypodense mass within segment IV which measures 3.6 x 3 cm and has increased in size since 11/4/2023.  0.7 cm low-attenuation lesion within the hepatic dome is unchanged and likely represents a cyst.  Additional 0.7 cm  hypodense lesion within the anterior aspect of segment IV is also unchanged.  GALLBLADDER: The patient is post cholecystectomy.  BILIARY: Dilated common bile duct is seen measuring up to 1.4 cm which is unchanged.  There is pneumobilia suggestive of postprocedural changes which is new.  SPLEEN: Splenomegaly measuring 14.7 cm anterior to posterior.  PANCREAS: Again visualized is a hypodense mass within the pancreatic tail extending into the splenic hilum which measures 1.3 x 1.5 cm and has slightly decreased in size (previously 1.7 x 1.6 cm).  ADRENALS: Unremarkable  KIDNEYS: The kidneys enhance symmetrically. There is no hydronephrosis.  There is a 1.2 x 1 cm enhancing lesion seen within the left interpolar kidney which is unchanged since the  prior exams.  Multiple simple bilateral renal cysts are again seen and are   unchanged.    AORTA/VASCULAR:   There is atherosclerotic calcification of the abdominal aorta extending into bilateral iliac arteries.  RETROPERITONEUM: No mass or enlarged adenopathy.    BOWEL: Again visualized is a large hiatal with perigastric varices which is unchanged. Remainder of the bowel is otherwise unremarkable without dilation or wall thickening.  MESENTERY: Normal.  No mass or hernia.    PELVIS: Limited evaluation due to streak artifact from right hip arthroplasty. No mass or fluid collection.  No enlarged lymph nodes.  Cystic uterine fibroids are seen measuring up to 1.4 cm.  BONES:   There is degenerative disease of the thoracic and lumbar spine. Degenerative changes are seen within the sacroiliac joints and pubic symphysis. Degenerative changes are seen in the bilateral hips.  Again visualized is a sclerotic lesion within  the L1 vertebral body which is unchanged since the prior exams.  No pathologic fracture or vertebral body height loss.     CONCLUSION:     3.6 cm hepatic metastasis has increased in size since 11/4/2023.     1.5 cm hypodense mass within the pancreatic tail has decreased in size.     Sclerotic metastasis within the L1 is unchanged.     Multiple bilateral pulmonary nodules are unchanged since the prior exams and suggestive of chronic scar.  These can be assessed for stability on subsequent follow-up surveillance imaging.     1.2 cm complex left interpolar renal lesion is indeterminate.  Findings may be secondary to a hemorrhagic or proteinaceous cyst. Nonemergent renal MRI (or triphasic CT if patient cannot tolerate MRI) is recommended to further evaluate.       No new area of metastasis within the chest, abdomen or pelvis.     Multiple other incidental findings as described in the body of the report.           Finalized by (CST): Jasson Luna MD on 1/08/2024 at 7:15 AM

## 2024-01-26 ENCOUNTER — APPOINTMENT (OUTPATIENT)
Dept: RADIATION ONCOLOGY | Facility: HOSPITAL | Age: 79
End: 2024-01-26
Attending: RADIOLOGY
Payer: MEDICARE

## 2024-02-01 ENCOUNTER — APPOINTMENT (OUTPATIENT)
Dept: RADIATION ONCOLOGY | Facility: HOSPITAL | Age: 79
End: 2024-02-01
Attending: RADIOLOGY
Payer: MEDICARE

## 2024-02-01 ENCOUNTER — OFFICE VISIT (OUTPATIENT)
Dept: HEMATOLOGY/ONCOLOGY | Facility: HOSPITAL | Age: 79
End: 2024-02-01
Attending: STUDENT IN AN ORGANIZED HEALTH CARE EDUCATION/TRAINING PROGRAM
Payer: MEDICARE

## 2024-02-01 VITALS
WEIGHT: 113.31 LBS | HEIGHT: 65 IN | BODY MASS INDEX: 18.88 KG/M2 | RESPIRATION RATE: 16 BRPM | TEMPERATURE: 98 F | DIASTOLIC BLOOD PRESSURE: 61 MMHG | SYSTOLIC BLOOD PRESSURE: 107 MMHG | OXYGEN SATURATION: 98 % | HEART RATE: 94 BPM

## 2024-02-01 DIAGNOSIS — G62.0 PERIPHERAL NEUROPATHY DUE TO CHEMOTHERAPY  (HCC): ICD-10-CM

## 2024-02-01 DIAGNOSIS — C25.9 ADENOCARCINOMA OF PANCREAS (HCC): Primary | ICD-10-CM

## 2024-02-01 DIAGNOSIS — Z51.11 CHEMOTHERAPY MANAGEMENT, ENCOUNTER FOR: ICD-10-CM

## 2024-02-01 DIAGNOSIS — R53.1 GENERALIZED WEAKNESS: ICD-10-CM

## 2024-02-01 DIAGNOSIS — T45.1X5A PERIPHERAL NEUROPATHY DUE TO CHEMOTHERAPY  (HCC): ICD-10-CM

## 2024-02-01 DIAGNOSIS — E87.6 HYPOKALEMIA DUE TO INADEQUATE POTASSIUM INTAKE: ICD-10-CM

## 2024-02-01 LAB
ALBUMIN SERPL-MCNC: 4.2 G/DL (ref 3.2–4.8)
ALBUMIN/GLOB SERPL: 1.4 {RATIO} (ref 1–2)
ALP LIVER SERPL-CCNC: 129 U/L
ALT SERPL-CCNC: 17 U/L
ANION GAP SERPL CALC-SCNC: 9 MMOL/L (ref 0–18)
AST SERPL-CCNC: 25 U/L (ref ?–34)
BASOPHILS # BLD AUTO: 0.01 X10(3) UL (ref 0–0.2)
BASOPHILS NFR BLD AUTO: 0.2 %
BILIRUB SERPL-MCNC: 0.7 MG/DL (ref 0.2–1.1)
BUN BLD-MCNC: 37 MG/DL (ref 9–23)
BUN/CREAT SERPL: 38.5 (ref 10–20)
CALCIUM BLD-MCNC: 10.2 MG/DL (ref 8.7–10.4)
CANCER AG19-9 SERPL-ACNC: <2 U/ML (ref ?–35)
CHLORIDE SERPL-SCNC: 105 MMOL/L (ref 98–112)
CO2 SERPL-SCNC: 25 MMOL/L (ref 21–32)
CREAT BLD-MCNC: 0.96 MG/DL
DEPRECATED RDW RBC AUTO: 49.3 FL (ref 35.1–46.3)
EGFRCR SERPLBLD CKD-EPI 2021: 60 ML/MIN/1.73M2 (ref 60–?)
EOSINOPHIL # BLD AUTO: 0.01 X10(3) UL (ref 0–0.7)
EOSINOPHIL NFR BLD AUTO: 0.2 %
ERYTHROCYTE [DISTWIDTH] IN BLOOD BY AUTOMATED COUNT: 14.6 % (ref 11–15)
FASTING STATUS PATIENT QL REPORTED: NO
GLOBULIN PLAS-MCNC: 3.1 G/DL (ref 2.8–4.4)
GLUCOSE BLD-MCNC: 90 MG/DL (ref 70–99)
HCT VFR BLD AUTO: 37.7 %
HGB BLD-MCNC: 12.6 G/DL
IMM GRANULOCYTES # BLD AUTO: 0.02 X10(3) UL (ref 0–1)
IMM GRANULOCYTES NFR BLD: 0.3 %
LYMPHOCYTES # BLD AUTO: 1.15 X10(3) UL (ref 1–4)
LYMPHOCYTES NFR BLD AUTO: 17.5 %
MCH RBC QN AUTO: 30.7 PG (ref 26–34)
MCHC RBC AUTO-ENTMCNC: 33.4 G/DL (ref 31–37)
MCV RBC AUTO: 92 FL
MONOCYTES # BLD AUTO: 0.84 X10(3) UL (ref 0.1–1)
MONOCYTES NFR BLD AUTO: 12.7 %
NEUTROPHILS # BLD AUTO: 4.56 X10 (3) UL (ref 1.5–7.7)
NEUTROPHILS # BLD AUTO: 4.56 X10(3) UL (ref 1.5–7.7)
NEUTROPHILS NFR BLD AUTO: 69.1 %
OSMOLALITY SERPL CALC.SUM OF ELEC: 296 MOSM/KG (ref 275–295)
PLATELET # BLD AUTO: 184 10(3)UL (ref 150–450)
POTASSIUM SERPL-SCNC: 4.1 MMOL/L (ref 3.5–5.1)
PROT SERPL-MCNC: 7.3 G/DL (ref 5.7–8.2)
RBC # BLD AUTO: 4.1 X10(6)UL
SODIUM SERPL-SCNC: 139 MMOL/L (ref 136–145)
WBC # BLD AUTO: 6.6 X10(3) UL (ref 4–11)

## 2024-02-01 PROCEDURE — 80053 COMPREHEN METABOLIC PANEL: CPT

## 2024-02-01 PROCEDURE — 96413 CHEMO IV INFUSION 1 HR: CPT

## 2024-02-01 PROCEDURE — 96417 CHEMO IV INFUS EACH ADDL SEQ: CPT

## 2024-02-01 PROCEDURE — 85025 COMPLETE CBC W/AUTO DIFF WBC: CPT

## 2024-02-01 PROCEDURE — 99215 OFFICE O/P EST HI 40 MIN: CPT | Performed by: STUDENT IN AN ORGANIZED HEALTH CARE EDUCATION/TRAINING PROGRAM

## 2024-02-01 PROCEDURE — G2211 COMPLEX E/M VISIT ADD ON: HCPCS | Performed by: STUDENT IN AN ORGANIZED HEALTH CARE EDUCATION/TRAINING PROGRAM

## 2024-02-01 PROCEDURE — 86301 IMMUNOASSAY TUMOR CA 19-9: CPT

## 2024-02-01 PROCEDURE — 96375 TX/PRO/DX INJ NEW DRUG ADDON: CPT

## 2024-02-01 RX ORDER — HEPARIN SODIUM (PORCINE) LOCK FLUSH IV SOLN 100 UNIT/ML 100 UNIT/ML
5 SOLUTION INTRAVENOUS ONCE
Status: COMPLETED | OUTPATIENT
Start: 2024-02-01 | End: 2024-02-01

## 2024-02-01 RX ORDER — HEPARIN SODIUM (PORCINE) LOCK FLUSH IV SOLN 100 UNIT/ML 100 UNIT/ML
5 SOLUTION INTRAVENOUS ONCE
OUTPATIENT
Start: 2024-02-01

## 2024-02-01 RX ORDER — HEPARIN SODIUM (PORCINE) LOCK FLUSH IV SOLN 100 UNIT/ML 100 UNIT/ML
SOLUTION INTRAVENOUS
Status: COMPLETED
Start: 2024-02-01 | End: 2024-02-01

## 2024-02-01 RX ORDER — SODIUM CHLORIDE 9 MG/ML
10 INJECTION, SOLUTION INTRAMUSCULAR; INTRAVENOUS; SUBCUTANEOUS ONCE
OUTPATIENT
Start: 2024-02-01

## 2024-02-01 RX ADMIN — HEPARIN SODIUM (PORCINE) LOCK FLUSH IV SOLN 100 UNIT/ML 500 UNITS: 100 SOLUTION INTRAVENOUS at 17:10:00

## 2024-02-01 NOTE — PROGRESS NOTES
Pt here for C17D1 Drug(s) Bellamy/Abraxane.  Arrives Ambulating independently, accompanied by Self     Patient was evaluated today by MD.    Oral medications included in this regimen:  no    Patient confirms comprehension of cancer treatment schedule:  yes    Pregnancy screening:  Denies possibility of pregnancy    Modifications in dose or schedule:  No    Medications appearance and physical integrity checked by RN: yes.    Chemotherapy IV pump settings verified by 2 RNs:  Yes.  Frequency of blood return and site check throughout administration: Prior to administration, Prior to each drug, and At completion of therapy     Infusion/treatment outcome:  patient tolerated treatment without incident    Education Record    Learner:  Patient  Barriers / Limitations:  None  Method:  Discussion  Education / instructions given:  plan of care, treatment schedule   Outcome:  Shows understanding    Discharged Home, Ambulating independently, accompanied by:Self    Patient/family verbalized understanding of future appointments: by printed AVS

## 2024-02-02 NOTE — PROGRESS NOTES
Rajwinder KINGSTON [facility]  Oncology Progress Note    Patient Name: Lucina Manzanares   YOB: 1945   Medical Record Number: [id number]    Chief Complaint: metastatic pancreatic cancer    Cancer Staging  Adenocarcinoma of pancreas (HCC)  Staging form: Exocrine Pancreas, AJCC 8th Edition  - Clinical stage from 1/25/2023: Stage IV (cTX, cNX, pM1) - Signed by Bartolo Dawkins DO on 1/30/2023    Oncology History Overview Note   Diagnosis: Metastatic MSI-S adenocarcinoma of the pancreas  Date of Dx: 1/25/23  Molecular: MSI-S  -somatic KRAS p.G12R, TP53, SMAD4  -germline TERT heterozygous VUS  Treatments:  -Gemcitabine, Abraxane    1/17/23: Initial medical oncology appointment. Briefly, the patient developed progressive left-sided abdominal pain in late November and was referred to gastroenterology (Dr. Hamm) who pursued imaging with a CT a/p noncontrast which revealed multiple hypoattenuating hepatic lesions, a solid and cystic pancreatic tail mass extending to the splenic hilum and splenomegaly with splenic vein occlusion.  Referred to medical oncology for further care. Discussed concern for pancreatic cancer, plan for CT c/a/p with contrast and IR biopsy.     1/21/23: CT c/a/p pancreatic tail mass with splenic vein occlusion, multiple liver metastases, bilateral lung nodules concerning for metastases and left upper quadrant omental nodularity suspicious for early carcinomatosis.     1/25/23: IR liver biopsy, PATH: MSI-S moderately differentiated adenocarcinoma of the pancreas. The tumor cells are positive for keratin AE1/AE3, CK7, CK20, , and CDX2, but are negative for GATA3 and TTF-1.    1/30/23: med onc followup. Discussed metastatic nature of disease, prognosis, palliative intent of therapy. Pt fit for gemcitabine/abraxane. Will start with dose-reduction given goals of care.   -2/6, port placement    2/9/23: C1D1 [place]/Abraxane  -2/16, tolerating well, cleared for D8, RTC 2 weeks. Treated for  strep pharyngitis.   3/23/23: tolerating well, cleared for C#3.     5/4/23: restaging CT shows partial response to treatment. Pt feeling weaker from chemo, continues to lose weight. Hold tx 1 week, reassess.     5/11/23: pt feeling stronger, gained 2-3 lbs. Will restart dose-reduced gem/abraxane, plan for chemo q2 weeks.     6/15/23: pt opted for q3 week dosing of [place]/Abraxane. Does very well during week 3. No pain, labs and PS adequate to continue therapy, continue q3 week dosing.     7/27/23: restaging CT c/a/p shows continued treatment response. Continue dose-reduced [place]/Abraxane every 3 weeks.     11/4/23: slightly growth to 1 hepatic lesion, continue chemotherapy, restage in 2 months.     1/11/24: restaging CT shows enlarging hepatic metastasis, but other areas of disease are stable. We will continue chemotherapy and plan for radiation to the hepatic metastasis.      Adenocarcinoma of pancreas (HCC)   1/25/2023 Cancer Staged    Staging form: Exocrine Pancreas, AJCC 8th Edition  - Clinical stage from 1/25/2023: Stage IV (cTX, cNX, pM1)     1/30/2023 Initial Diagnosis    Adenocarcinoma of pancreas (HCC)     2/9/2023 -  Chemotherapy    OP Albumin-bound PACLitaxel / Gemcitabine  Plan Provider: Bartolo Dawkins,   Treatment goal: Palliative  Line of treatment: [No plan line of treatment]       Subjective:   Lucina Manzanares is a 78 year old female with a hx of HTN, asthma, and fibromyalgia who presents to medical oncology regarding metastatic MSI-S adenocarcinoma of the pancreas on frontline gemcitabine Abraxane as detailed above.  He has developed compiling toxicities mainly fatigue poor appetite and dysgeusia and so she is receiving dose reduced gemcitabine Abraxane every 3 weeks.    Lost a few more pounds.  Feels like chemotherapy side effects are lingering a little bit longer through the first week.  She is doing her best to eat and maintain her oral intake.    Review of Systems:  Hematology/Oncology ROS  performed and negative except as above in HPI    History/Other:   Current Medications:   gabapentin 100 MG Oral Cap Take 1 capsule (100 mg total) by mouth nightly. 90 capsule 1    montelukast 10 MG Oral Tab Take 1 tablet (10 mg total) by mouth nightly. 90 tablet 1    lansoprazole 30 MG Oral Capsule Delayed Release Take 1 capsule (30 mg total) by mouth before breakfast. 90 capsule 3    simvastatin 40 MG Oral Tab Take 1 tablet (40 mg total) by mouth every evening. 90 tablet 3    losartan 50 MG Oral Tab Take 1 tablet (50 mg total) by mouth daily. 90 tablet 3    Potassium Chloride ER 10 MEQ Oral Tab CR Take 2 tablets (20 mEq total) by mouth daily. For 1 week then as directed by provider 60 tablet 0    escitalopram 5 MG Oral Tab Take 1 tablet (5 mg total) by mouth daily. 30 tablet 1    lidocaine-prilocaine 2.5-2.5 % External Cream Apply to site 1 hour prior to port a cath needle insertion 30 g 1    ondansetron (ZOFRAN) 8 MG tablet Take 1 tablet (8 mg total) by mouth every 8 (eight) hours as needed for Nausea. 30 tablet 3    prochlorperazine (COMPAZINE) 10 mg tablet Take 1 tablet (10 mg total) by mouth every 8 (eight) hours as needed for Nausea. 30 tablet 3    traMADol 50 MG Oral Tab Take 1-2 tablets ( mg total) by mouth every 6 (six) hours as needed for Pain. 90 tablet 0    LORazepam 0.5 MG Oral Tab Take 1 tablet (0.5 mg total) by mouth every 12 (twelve) hours as needed for Anxiety. 60 tablet 0    Fluticasone Propionate 50 MCG/ACT Nasal Suspension 2 sprays by Nasal route daily. (Patient taking differently: 2 sprays by Nasal route as needed.) 48 g 3    Albuterol Sulfate HFA (PROAIR HFA) 108 (90 Base) MCG/ACT Inhalation Aero Soln Inhale 1 puff into the lungs every 6 (six) hours as needed for Wheezing. 24 g 1     Allergies:   Allergies   Allergen Reactions    Ciprofloxacin JITTERY     Also got a headache    Cortisone      Other reaction(s): Rash       Objective:   Blood pressure 107/61, pulse 94, temperature 97.6 °F  (36.4 °C), temperature source Oral, resp. rate 16, height 1.651 m (5' 5\"), weight 51.4 kg (113 lb 4.8 oz), SpO2 98%.  Physical Exam:  ECOG PS: 1  General: A&Ox3, NAD  HEENT: EOMsi, Anicteric, OP clear  CV: RRR  Pulm: normal effort, CTA  Abd: soft, ntnd, bs+  Extremities: no edema  Neurological: grossly intact, normal sensation to light touch on fingers.   Derm:  Dry skin noted on legs and back.  Excoriations noted.  Pin point scabs on BLE.  Similar rash on forearms but quantity is sparse.  No signs of infection, erythema, vesicles or open wounds.      Results:   Labs:  Lab Results   Component Value Date    WBC 6.6 02/01/2024    HGB 12.6 02/01/2024    HCT 37.7 02/01/2024    .0 02/01/2024    CREATSERUM 0.96 02/01/2024    BUN 37 (H) 02/01/2024     02/01/2024    K 4.1 02/01/2024     02/01/2024    CO2 25.0 02/01/2024    GLU 90 02/01/2024    CA 10.2 02/01/2024    ALB 4.2 02/01/2024    ALKPHO 129 02/01/2024    BILT 0.7 02/01/2024    TP 7.3 02/01/2024    AST 25 02/01/2024    ALT 17 02/01/2024    INR 1.19 (H) 01/17/2023    TSH 1.18 08/10/2015    CRP <0.5 07/03/2016     Imaging:      Pathology:  Liver; core biopsy:   Moderately differentiated adenocarcinoma (see comment). MSI stable.   Comment:  Patient's recent CT of the chest, abdomen, and pelvis (1/21/23) is reported to demonstrate a 4.5 cm pancreatic tail mass which occludes the left splenic vein, multiple liver metastases, bilateral lung nodules, and left upper quadrant omental nodularity.  Sections of the liver biopsy demonstrate extensive involvement by moderately differentiated adenocarcinoma.  The tumor cells are positive for keratin AE1/AE3, CK7, CK20, , and CDX2, but are negative for GATA3 and TTF-1.    Assessment & Plan:   Lucina Manzanares is a 78 year old female with a hx of HTN, asthma, and fibromyalgia who presents for medical oncology follow-up regarding metastatic MSI-S adenocarcinoma of the pancreas on frontline gemcitabine and  abraxane.     # MSI-S moderately differentiated adenocarcinoma of the pancreas.   -began FDR [place] and dose-reduced Abraxane D1/8 q21 days on 2/9/23, with restaging in July showing a continued partial response and restaging in November 2023 shwoed very mild oligoprogression, specifically to 1 lesion in the liver and one small pulmonary nodule.  -restaging CT c/a/p 1/6/24 shows continued enlargement of hepatic metastasis, though other sites of disease are stable, she was referred to radiation oncology to consider radiation to [place] progressive liver metastasis and they are hoping to start this in the next 1 to 2 weeks   -continue Abraxane 75 mg/m2 with FDR Gemcitabine 600mg/m2/60min  -RTC in 3 weeks  -Again stressed diet maintain adequate oral intake    # Supportive Care, pain management   -moderate cancer related pain initially, currently improved and patient no longer having pain  -anxiety, Lexapro 5mg daily, pt also has ativan 0.5mg q12hr prn  -pruritus, etiology unclear, likely dry skin, prn montelukast 10 mg at bedtime  -CIPN with secondary insomnia, gabapentin 100mg nightly     # Comorbidities.  -HTN, losartan  -asthma, albuterol, fluticasone  -HLD, simvastatin  -GERD, lansoprazole    # Emotional well being  -discussed today, the patient is aware of support systems available through the Cancer Center, currently no concerns or issues. The diagnosis, prognosis, treatment goals, plan for treatment, and anticipated response were explained to the patient.   -Advanced Care Planning: not discussed today    MDM High: malignancy; review of results with independent interpretation and discussion of management; drug therapy requiring intensive monitoring for toxicity;  ongoing continuity of complex care    Bartolo Dawkins DO    [facility]  Rajwinder KINGSTON [facility]  This note was created using a voice-recognition transcribing system. Incorrect words or phrases may have been missed  during proofreading. Please interpret accordingly.

## 2024-02-12 ENCOUNTER — APPOINTMENT (OUTPATIENT)
Dept: RADIATION ONCOLOGY | Facility: HOSPITAL | Age: 79
End: 2024-02-12
Attending: RADIOLOGY
Payer: MEDICARE

## 2024-02-12 PROCEDURE — 77373 STRTCTC BDY RAD THER TX DLVR: CPT | Performed by: RADIOLOGY

## 2024-02-13 ENCOUNTER — APPOINTMENT (OUTPATIENT)
Dept: RADIATION ONCOLOGY | Facility: HOSPITAL | Age: 79
End: 2024-02-13
Attending: RADIOLOGY
Payer: MEDICARE

## 2024-02-13 PROCEDURE — 77373 STRTCTC BDY RAD THER TX DLVR: CPT | Performed by: RADIOLOGY

## 2024-02-14 ENCOUNTER — APPOINTMENT (OUTPATIENT)
Dept: RADIATION ONCOLOGY | Facility: HOSPITAL | Age: 79
End: 2024-02-14
Attending: RADIOLOGY
Payer: MEDICARE

## 2024-02-14 PROCEDURE — 77373 STRTCTC BDY RAD THER TX DLVR: CPT | Performed by: RADIOLOGY

## 2024-02-15 ENCOUNTER — APPOINTMENT (OUTPATIENT)
Dept: RADIATION ONCOLOGY | Facility: HOSPITAL | Age: 79
End: 2024-02-15
Attending: RADIOLOGY
Payer: MEDICARE

## 2024-02-15 PROCEDURE — 77373 STRTCTC BDY RAD THER TX DLVR: CPT | Performed by: RADIOLOGY

## 2024-02-15 NOTE — PROGRESS NOTES
[facility] Radiation Treatment Management Note 1-5    Patient:  Lucina Manzanares  Age:  78 year old  Visit Diagnosis:  No diagnosis found.  Primary Rad/Onc:  Dr. Haim Reynolds    Site Delivered Dose (cGy) Prescribed Dose (cGy) Fraction #   SBRT Liver 5500 5500 5/5           First treatment date:  2/16/2024  Concurrent chemotherapy: None        1/11/2024     2:39 PM 1/18/2024    12:34 PM 2/1/2024     1:33 PM   Oncology Vitals   Height 5' 5\"  5' 5\"   Height 165 cm  165 cm   Weight 117 lb 9.6 oz 114 lb 6.4 oz 113 lb 4.8 oz   Weight 53.343 kg 51.891 kg 51.393 kg   BSA (m2) 1.58 m2 1.56 m2 1.55 m2   BMI 19.57 kg/m2 19.04 kg/m2 18.85 kg/m2   /83 150/85 107/61   Pulse 83 92 94   Resp 16 16 16   Temp 98.1 °F (36.7 °C) 97.4 °F (36.3 °C) 97.6 °F (36.4 °C)   SpO2 97 % 100 % 98 %   Pain Score 0 0 0        Toxicities:  Fatigue Grade 1= Fatigue relieved by rest  Nausea Grade 1= Loss of appetite without alteration in eating habits , taking zofran.  Vomiting Grade 0= None  Abdominal pain Grade 0= None  Flatus increased taking gas-x.     Nursing Note:  Wt Readings from Last 6 Encounters:   02/16/24 49.4 kg (108 lb 12.8 oz)   02/01/24 51.4 kg (113 lb 4.8 oz)   01/18/24 51.9 kg (114 lb 6.4 oz)   01/11/24 53.3 kg (117 lb 9.6 oz)   12/21/23 55.7 kg (122 lb 12.8 oz)   11/30/23 56.1 kg (123 lb 9.6 oz)     Eating and drinking well.   Dr. Reynolds to see pt.     Griselle R, RN    Physician Note:  Subjective:    Pt tolerated tx well,     Trying to increase calories, hist  Objective:    NAD    Treatment setup imaging have been reviewed:  Yes    Assessment/Plan:    SBRT liver lesion        Continue radiotherapy per plan    Next visit:  to f/u with Dr Juancho Reynolds

## 2024-02-16 ENCOUNTER — OFFICE VISIT (OUTPATIENT)
Dept: RADIATION ONCOLOGY | Facility: HOSPITAL | Age: 79
End: 2024-02-16
Attending: RADIOLOGY
Payer: MEDICARE

## 2024-02-16 VITALS
TEMPERATURE: 98 F | HEART RATE: 98 BPM | BODY MASS INDEX: 18 KG/M2 | DIASTOLIC BLOOD PRESSURE: 81 MMHG | OXYGEN SATURATION: 100 % | RESPIRATION RATE: 16 BRPM | SYSTOLIC BLOOD PRESSURE: 135 MMHG | WEIGHT: 108.81 LBS

## 2024-02-16 PROCEDURE — 77336 RADIATION PHYSICS CONSULT: CPT | Performed by: RADIOLOGY

## 2024-02-16 PROCEDURE — 77373 STRTCTC BDY RAD THER TX DLVR: CPT | Performed by: RADIOLOGY

## 2024-02-16 NOTE — PROGRESS NOTES
RADIATION ONCOLOGY COMPLETION SUMMARY NOTE    DIAGNOSIS :  Oligo-metastatic pancreatic cancer to the liver, s/p SBRT on 2/16/2024.    Dear Madan Dawkins and colleagues,  As you recall, Lucina Manzanares is a pleasant 78 year old female who presents with her daughter to discuss the role of XRT.  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  Pt had 4 liver mets on presentation, and non-specific pulm nodules which on f/u scanning appeared to be chronic scars.       PATH: MSI-S moderately differentiated adenocarcinoma of the pancreas.   Pt has had q3 week [place]/Abraxane with continued treatment response with slightly growth in 1 hepatic lesion, as below, to 3.6cm.  Pt continues on further systemic tx.      Various management options were discussed and pt opted for a course of XRT as outlined below.      SUMMARY OF RADIATION THERAPY    Treatment Summary: Course: 1 Liver    Treatment Site Ref. ID Energy Dose/Fx (cGy) #Fx Dose Correction (cGy) Total Dose (cGy) Start Date End Date Elapsed Days   SBRT Liver SBRT Liver 6X-FFF 1,100 5 / 5 0 5,500 2/12/2024 2/16/2024 4   Total:      5,500 2/12/2024 2/16/2024 4       Treatment Technique:  SBRT - Rapid Arc    Overall, pt tolerated treatment well and the dose was delivered as planned.       The patient will continue systemic tx per Dr aDwkins    I have explained that will be obtaining post-tx CT will likely show an area of hypoperfusion in the treated field, which will improve on follow-up.    Ideally, pt will continue to respond to systemic tx and I have discussed the role of XRT if needed.    As the patient is doing well, I will see patient on a PRN basis in  months for a routine follow-up visit.      Thank you very much for allowing us to take care of your patient.      Haim Reynolds MD,  [facility]  Radiation Oncology  [email or web address]  [phone]      Oncology Chemo Meds          Cycle 13; Day 1    11/9/2023 Cycle 14; Day 1    11/30/2023 Cycle 15; Day 1    12/21/2023 Cycle 16; Day 1    1/11/2024 Cycle 17; Day 1    2/1/2024 Cycle 18; [ Day 1 ]    2/29/2024   Oncology Flowsheet   Day, Cycle Day 1, Cycle 13 Day 1, Cycle 14 Day 1, Cycle 15 Day 1, Cycle 16 Day 1, Cycle 17 [ Day 1, Cycle 18 ]   dexAMETHasone 100 MG/10ML (Decadron) IV New Bag (unknown dose, 20 mg ordered) New Bag (unknown dose, 20 mg ordered) New Bag (unknown dose, 20 mg ordered) New Bag (unknown dose, 20 mg ordered) New Bag (unknown dose, 20 mg ordered) [ 20 mg ]   gemcitabine 2 g/52.6mL (Gemzar)  mg/m2 = 988 mg 600 mg/m2 = 988 mg 600 mg/m2 = 950 mg 600 mg/m2 = 950 mg 600 mg/m2 = 950 mg [ 600 mg/m2 ] = 950 mg   PACLitaxel protein bound particles (Abraxane) IV 80 mg/m2 = 130 mg 75 mg/m2 = 120 mg 75 mg/m2 = 120 mg 75 mg/m2 = 120 mg 75 mg/m2 = 120 mg       +blood [ 75 mg/m2 ] = 120 mg   palonosetron 0.25 MG/5ML (Aloxi) IV New Bag (unknown dose, 0.25 mg ordered) New Bag (unknown dose, 0.25 mg ordered) New Bag (unknown dose, 0.25 mg ordered) New Bag (unknown dose, 0.25 mg ordered) New Bag (unknown dose, 0.25 mg ordered) [ 0.25 mg ]   sodium chloride 0.9%  mL    New Bag (unknown dose, 100 mL ordered) 250 mL    New Bag (unknown dose, 100 mL ordered) 250 mL    New Bag (unknown dose, 100 mL ordered) 250 mL    New Bag (unknown dose, 100 mL ordered) 250 mL    New Bag (unknown dose, 100 mL ordered) [ 250 mL ]    [ 100 mL ]      Details          [ Planned dose ]    Administered dose cannot be determined

## 2024-02-16 NOTE — PATIENT INSTRUCTIONS
Follow up with Dr. Reynolds as needed.    Please call [phone] with any radiation questions.

## 2024-02-22 ENCOUNTER — OFFICE VISIT (OUTPATIENT)
Dept: HEMATOLOGY/ONCOLOGY | Facility: HOSPITAL | Age: 79
End: 2024-02-22
Attending: STUDENT IN AN ORGANIZED HEALTH CARE EDUCATION/TRAINING PROGRAM
Payer: MEDICARE

## 2024-02-22 VITALS — HEART RATE: 75 BPM | SYSTOLIC BLOOD PRESSURE: 134 MMHG | DIASTOLIC BLOOD PRESSURE: 91 MMHG

## 2024-02-22 VITALS
SYSTOLIC BLOOD PRESSURE: 124 MMHG | HEIGHT: 65 IN | OXYGEN SATURATION: 100 % | RESPIRATION RATE: 16 BRPM | HEART RATE: 97 BPM | DIASTOLIC BLOOD PRESSURE: 89 MMHG | WEIGHT: 104.88 LBS | TEMPERATURE: 98 F | BODY MASS INDEX: 17.47 KG/M2

## 2024-02-22 DIAGNOSIS — Z51.11 CHEMOTHERAPY MANAGEMENT, ENCOUNTER FOR: ICD-10-CM

## 2024-02-22 DIAGNOSIS — R11.0 NAUSEA: Primary | ICD-10-CM

## 2024-02-22 DIAGNOSIS — C25.9 ADENOCARCINOMA OF PANCREAS (HCC): Primary | ICD-10-CM

## 2024-02-22 DIAGNOSIS — G62.0 PERIPHERAL NEUROPATHY DUE TO CHEMOTHERAPY (HCC): ICD-10-CM

## 2024-02-22 DIAGNOSIS — R11.0 NAUSEA: ICD-10-CM

## 2024-02-22 DIAGNOSIS — M47.26 OSTEOARTHRITIS OF SPINE WITH RADICULOPATHY, LUMBAR REGION: ICD-10-CM

## 2024-02-22 DIAGNOSIS — R53.1 GENERALIZED WEAKNESS: ICD-10-CM

## 2024-02-22 DIAGNOSIS — E87.6 HYPOKALEMIA DUE TO INADEQUATE POTASSIUM INTAKE: ICD-10-CM

## 2024-02-22 DIAGNOSIS — K59.09 OTHER CONSTIPATION: ICD-10-CM

## 2024-02-22 DIAGNOSIS — T45.1X5A PERIPHERAL NEUROPATHY DUE TO CHEMOTHERAPY (HCC): ICD-10-CM

## 2024-02-22 DIAGNOSIS — E86.1 HYPOVOLEMIA: ICD-10-CM

## 2024-02-22 LAB
ALBUMIN SERPL-MCNC: 4.2 G/DL (ref 3.2–4.8)
ALBUMIN/GLOB SERPL: 1.5 {RATIO} (ref 1–2)
ALP LIVER SERPL-CCNC: 116 U/L
ALT SERPL-CCNC: 12 U/L
ANION GAP SERPL CALC-SCNC: 8 MMOL/L (ref 0–18)
AST SERPL-CCNC: 21 U/L (ref ?–34)
BASOPHILS # BLD AUTO: 0.01 X10(3) UL (ref 0–0.2)
BASOPHILS NFR BLD AUTO: 0.2 %
BILIRUB SERPL-MCNC: 0.7 MG/DL (ref 0.2–1.1)
BUN BLD-MCNC: 14 MG/DL (ref 9–23)
BUN/CREAT SERPL: 18.4 (ref 10–20)
CALCIUM BLD-MCNC: 10 MG/DL (ref 8.7–10.4)
CANCER AG19-9 SERPL-ACNC: 4 U/ML (ref ?–35)
CHLORIDE SERPL-SCNC: 107 MMOL/L (ref 98–112)
CO2 SERPL-SCNC: 25 MMOL/L (ref 21–32)
CREAT BLD-MCNC: 0.76 MG/DL
DEPRECATED RDW RBC AUTO: 49.7 FL (ref 35.1–46.3)
EGFRCR SERPLBLD CKD-EPI 2021: 80 ML/MIN/1.73M2 (ref 60–?)
EOSINOPHIL # BLD AUTO: 0.01 X10(3) UL (ref 0–0.7)
EOSINOPHIL NFR BLD AUTO: 0.2 %
ERYTHROCYTE [DISTWIDTH] IN BLOOD BY AUTOMATED COUNT: 14.6 % (ref 11–15)
GLOBULIN PLAS-MCNC: 2.8 G/DL (ref 2.8–4.4)
GLUCOSE BLD-MCNC: 138 MG/DL (ref 70–99)
HCT VFR BLD AUTO: 38.4 %
HGB BLD-MCNC: 12.5 G/DL
IMM GRANULOCYTES # BLD AUTO: 0.02 X10(3) UL (ref 0–1)
IMM GRANULOCYTES NFR BLD: 0.5 %
LYMPHOCYTES # BLD AUTO: 0.28 X10(3) UL (ref 1–4)
LYMPHOCYTES NFR BLD AUTO: 6.6 %
MCH RBC QN AUTO: 30 PG (ref 26–34)
MCHC RBC AUTO-ENTMCNC: 32.6 G/DL (ref 31–37)
MCV RBC AUTO: 92.3 FL
MONOCYTES # BLD AUTO: 0.51 X10(3) UL (ref 0.1–1)
MONOCYTES NFR BLD AUTO: 11.9 %
NEUTROPHILS # BLD AUTO: 3.44 X10 (3) UL (ref 1.5–7.7)
NEUTROPHILS # BLD AUTO: 3.44 X10(3) UL (ref 1.5–7.7)
NEUTROPHILS NFR BLD AUTO: 80.6 %
OSMOLALITY SERPL CALC.SUM OF ELEC: 293 MOSM/KG (ref 275–295)
PLATELET # BLD AUTO: 130 10(3)UL (ref 150–450)
POTASSIUM SERPL-SCNC: 3.5 MMOL/L (ref 3.5–5.1)
PROT SERPL-MCNC: 7 G/DL (ref 5.7–8.2)
RBC # BLD AUTO: 4.16 X10(6)UL
SODIUM SERPL-SCNC: 140 MMOL/L (ref 136–145)
WBC # BLD AUTO: 4.3 X10(3) UL (ref 4–11)

## 2024-02-22 PROCEDURE — 85025 COMPLETE CBC W/AUTO DIFF WBC: CPT

## 2024-02-22 PROCEDURE — 80053 COMPREHEN METABOLIC PANEL: CPT

## 2024-02-22 PROCEDURE — 96374 THER/PROPH/DIAG INJ IV PUSH: CPT

## 2024-02-22 PROCEDURE — 86301 IMMUNOASSAY TUMOR CA 19-9: CPT

## 2024-02-22 PROCEDURE — 99215 OFFICE O/P EST HI 40 MIN: CPT | Performed by: PHYSICIAN ASSISTANT

## 2024-02-22 PROCEDURE — 96361 HYDRATE IV INFUSION ADD-ON: CPT

## 2024-02-22 RX ORDER — POLYETHYLENE GLYCOL 3350 17 G/17G
17 POWDER, FOR SOLUTION ORAL DAILY
Qty: 578 G | Refills: 1 | Status: SHIPPED | OUTPATIENT
Start: 2024-02-22

## 2024-02-22 RX ORDER — HEPARIN SODIUM (PORCINE) LOCK FLUSH IV SOLN 100 UNIT/ML 100 UNIT/ML
5 SOLUTION INTRAVENOUS ONCE
Status: DISCONTINUED | OUTPATIENT
Start: 2024-02-22 | End: 2024-02-22

## 2024-02-22 RX ORDER — ONDANSETRON 2 MG/ML
4 INJECTION INTRAMUSCULAR; INTRAVENOUS ONCE
Status: CANCELLED
Start: 2024-02-22 | End: 2024-02-22

## 2024-02-22 RX ORDER — SODIUM CHLORIDE 9 MG/ML
10 INJECTION, SOLUTION INTRAMUSCULAR; INTRAVENOUS; SUBCUTANEOUS ONCE
OUTPATIENT
Start: 2024-02-22

## 2024-02-22 RX ORDER — ONDANSETRON 8 MG/1
8 TABLET, ORALLY DISINTEGRATING ORAL EVERY 8 HOURS PRN
Qty: 30 TABLET | Refills: 2 | Status: SHIPPED | OUTPATIENT
Start: 2024-02-22

## 2024-02-22 RX ORDER — HEPARIN SODIUM (PORCINE) LOCK FLUSH IV SOLN 100 UNIT/ML 100 UNIT/ML
5 SOLUTION INTRAVENOUS ONCE
OUTPATIENT
Start: 2024-02-22

## 2024-02-22 RX ORDER — ONDANSETRON 2 MG/ML
INJECTION INTRAMUSCULAR; INTRAVENOUS
Status: COMPLETED
Start: 2024-02-22 | End: 2024-02-22

## 2024-02-22 RX ORDER — HEPARIN SODIUM (PORCINE) LOCK FLUSH IV SOLN 100 UNIT/ML 100 UNIT/ML
SOLUTION INTRAVENOUS
Status: DISCONTINUED
Start: 2024-02-22 | End: 2024-02-22

## 2024-02-22 RX ORDER — ONDANSETRON 2 MG/ML
4 INJECTION INTRAMUSCULAR; INTRAVENOUS ONCE
Status: COMPLETED | OUTPATIENT
Start: 2024-02-22 | End: 2024-02-22

## 2024-02-22 RX ADMIN — ONDANSETRON 4 MG: 2 INJECTION INTRAMUSCULAR; INTRAVENOUS at 14:42:00

## 2024-02-22 NOTE — PROGRESS NOTES
Pt here for 1 L IVF - per Leonard Pascual PA-C no chemotherapy today. Patient nauseated and had a few episodes of vomiting.   Took compazine at 0845 am. Did not take any Zofran today -patient brought 4  mg ODT zofran from home. Took at 1400 with RN witness.   Completed radiation last week - has been nauseated and vomiting since last week.   Discussed taking consistent Compazine / Zofran usage - new rx sent for 8 mg PO Zofran.   Extra 4 mg IV Zofran given - relief reported.   Per Leonard Pascual - PAC to take orthostatic vitals post IVF - see flowsheet. Patient denies any dizziness when moving from sitting to standing.   Discussed moving slowly and pushing PO fluids.   Patient instructed to call if symptoms do not improve to receive further IVF.        Pt tolerated infusion without difficulty or complaint.       Reviewed next apt date/time: 2/29 for delayed C18 chemotherapy   Discharged in stable condition.    Education Record  Learner:  Patient and Family Member  Disease / Diagnosis: pancreatic cancer   Barriers / Limitations:  None  Method:  Discussion  General Topics:  Plan of care reviewed  Outcome:  Shows understanding

## 2024-02-22 NOTE — PATIENT INSTRUCTIONS
Defer chemotherapy 1 week.  Follow-up with Dr. Dawkins.  IV fluids today  Can take TWO tablets of ondansetron ODT 4 mg every 8 hours  Start Polyethylene glycol once daily.  Mix 17 grams of powder with any fluid.  If after 2 days no bowel movement, increase to twice daily  Increase oral intake  Continue gabapentin 100 mg at bedtime  Tylenol or Tramadol as needed for back pain.  Elevate legs with pillows to alleviate pressure on low back when lying down.  May also discuss treatment options with Dr. Perez  Call as needed

## 2024-02-22 NOTE — PATIENT INSTRUCTIONS
FOR NAUSEA:     ONDANSETRON (ZOFRAN) *8 MG* --change from 4 MG PICKUP FROM PHARMACY   Take every 8 hours   Due for next dose at approx 1000 pm     PROCHLORPERAZINE (COMPAZINE) 10 mg  Take every 8 hours   You may overlap Compazine and Zofran  Due for next dose at approx 445 pm

## 2024-02-22 NOTE — PROGRESS NOTES
Rajwinder KINGSTON [facility]  Oncology Progress Note    Patient Name: Lucina Manzanares   YOB: 1945   Medical Record Number: [id number]    Chief Complaint: metastatic pancreatic cancer    Cancer Staging  Adenocarcinoma of pancreas (HCC)  Staging form: Exocrine Pancreas, AJCC 8th Edition  - Clinical stage from 1/25/2023: Stage IV (cTX, cNX, pM1) - Signed by Bartolo Dawkins DO on 1/30/2023    Oncology History Overview Note   Diagnosis: Metastatic MSI-S adenocarcinoma of the pancreas  Date of Dx: 1/25/23  Molecular: MSI-S  -somatic KRAS p.G12R, TP53, SMAD4  -germline TERT heterozygous VUS  Treatments:  -Gemcitabine, Abraxane    1/17/23: Initial medical oncology appointment. Briefly, the patient developed progressive left-sided abdominal pain in late November and was referred to gastroenterology (Dr. Hamm) who pursued imaging with a CT a/p noncontrast which revealed multiple hypoattenuating hepatic lesions, a solid and cystic pancreatic tail mass extending to the splenic hilum and splenomegaly with splenic vein occlusion.  Referred to medical oncology for further care. Discussed concern for pancreatic cancer, plan for CT c/a/p with contrast and IR biopsy.     1/21/23: CT c/a/p pancreatic tail mass with splenic vein occlusion, multiple liver metastases, bilateral lung nodules concerning for metastases and left upper quadrant omental nodularity suspicious for early carcinomatosis.     1/25/23: IR liver biopsy, PATH: MSI-S moderately differentiated adenocarcinoma of the pancreas. The tumor cells are positive for keratin AE1/AE3, CK7, CK20, , and CDX2, but are negative for GATA3 and TTF-1.    1/30/23: med onc followup. Discussed metastatic nature of disease, prognosis, palliative intent of therapy. Pt fit for gemcitabine/abraxane. Will start with dose-reduction given goals of care.   -2/6, port placement    2/9/23: C1D1 [place]/Abraxane  -2/16, tolerating well, cleared for D8, RTC 2 weeks. Treated for  strep pharyngitis.   3/23/23: tolerating well, cleared for C#3.     5/4/23: restaging CT shows partial response to treatment. Pt feeling weaker from chemo, continues to lose weight. Hold tx 1 week, reassess.     5/11/23: pt feeling stronger, gained 2-3 lbs. Will restart dose-reduced gem/abraxane, plan for chemo q2 weeks.     6/15/23: pt opted for q3 week dosing of [place]/Abraxane. Does very well during week 3. No pain, labs and PS adequate to continue therapy, continue q3 week dosing.     7/27/23: restaging CT c/a/p shows continued treatment response. Continue dose-reduced [place]/Abraxane every 3 weeks.     11/4/23: slightly growth to 1 hepatic lesion, continue chemotherapy, restage in 2 months.     1/11/24: restaging CT shows enlarging hepatic metastasis, but other areas of disease are stable. We will continue chemotherapy and plan for radiation to the hepatic metastasis.      Adenocarcinoma of pancreas (HCC)   1/25/2023 Cancer Staged    Staging form: Exocrine Pancreas, AJCC 8th Edition  - Clinical stage from 1/25/2023: Stage IV (cTX, cNX, pM1)     1/30/2023 Initial Diagnosis    Adenocarcinoma of pancreas (HCC)     2/9/2023 -  Chemotherapy    OP Albumin-bound PACLitaxel / Gemcitabine  Plan Provider: Bartolo Dawkins DO  Treatment goal: Palliative  Line of treatment: [No plan line of treatment]       Subjective:   Lucina Manzanares is a 78 year old female with a hx of HTN, asthma, and fibromyalgia who presents to medical oncology regarding metastatic MSI-S adenocarcinoma of the pancreas on frontline gemcitabine Abraxane as detailed above.  She has developed compiling toxicities mainly fatigue poor appetite and dysgeusia and so she is receiving dose reduced gemcitabine Abraxane every 3 weeks.    Today, patient states she does not feel strong enough for chemotherapy.  The week after chemotherapy is the hardest per usual, then she felt better the week of radiation and now declining again.  She endorses decreased oral intake  due to dysgeusia, nausea, no appetite and constipation.  She is using prochlorperazine for nausea without benefit.  She also has ondansetron ODT 4 mg at home.  She tried a generic stool softener which has not been effective.  She states she's has left lower back pain since January 2024 that radiates to her LLQ of her abdomen/groin then extends, anteriorly, to her toes.  When she lies on her right side with legs curled into stomach and pillow between her legs, she is pain free.  If she leans forward while sitting, she is pain free.  She denies any change in activity or trauma that produced this new pain.  She is using gabapentin 100 mg at bedtime for CIPN, which has helped.      She denies mouth sores, cough, dyspnea, abdominal pain, diarrhea, peripheral edema and rash.     Review of Systems:  Hematology/Oncology ROS performed and negative except as above in HPI    History/Other:   Current Medications:   prochlorperazine (COMPAZINE) 10 mg tablet Take 1 tablet (10 mg total) by mouth every 8 (eight) hours as needed for Nausea. 30 tablet 3     Allergies:   Allergies   Allergen Reactions    Ciprofloxacin JITTERY     Also got a headache    Cortisone      Other reaction(s): Rash       Objective:   Blood pressure 124/89, pulse 97, temperature 97.5 °F (36.4 °C), temperature source Oral, resp. rate 16, height 1.651 m (5' 5\"), weight 47.6 kg (104 lb 14.4 oz), SpO2 100%.  Physical Exam:  ECOG PS: 1  General: A&Ox3, cachetic, pale, NAD, in w/c, 4# weight loss in 1 week, 9# in 3 weeks  HEENT: EOMsi, Anicteric, OP clear  CV: RRR  Pulm: normal effort, CTA  Abd: soft, ntnd, bs+  Extremities: no edema  Neurological: grossly intact, normal sensation to light touch on fingers.       Results:   Labs:  Lab Results   Component Value Date    WBC 4.3 02/22/2024    HGB 12.5 02/22/2024    HCT 38.4 02/22/2024    .0 (L) 02/22/2024    CREATSERUM 0.76 02/22/2024    BUN 14 02/22/2024     02/22/2024    K 3.5 02/22/2024     107  02/22/2024    CO2 25.0 02/22/2024     (H) 02/22/2024    CA 10.0 02/22/2024    ALB 4.2 02/22/2024    ALKPHO 116 02/22/2024    BILT 0.7 02/22/2024    TP 7.0 02/22/2024    AST 21 02/22/2024    ALT 12 02/22/2024    INR 1.19 (H) 01/17/2023    TSH 1.18 08/10/2015    CRP <0.5 07/03/2016     Imaging:      Pathology:  Liver; core biopsy:   Moderately differentiated adenocarcinoma (see comment). MSI stable.   Comment:  Patient's recent CT of the chest, abdomen, and pelvis (1/21/23) is reported to demonstrate a 4.5 cm pancreatic tail mass which occludes the left splenic vein, multiple liver metastases, bilateral lung nodules, and left upper quadrant omental nodularity.  Sections of the liver biopsy demonstrate extensive involvement by moderately differentiated adenocarcinoma.  The tumor cells are positive for keratin AE1/AE3, CK7, CK20, , and CDX2, but are negative for GATA3 and TTF-1.    Assessment & Plan:   Lucina Manzanares is a 78 year old female with a hx of HTN, asthma, and fibromyalgia who presents for medical oncology follow-up regarding metastatic MSI-S adenocarcinoma of the pancreas on frontline gemcitabine and abraxane.     # MSI-S moderately differentiated adenocarcinoma of the pancreas.   -began FDR [place] and dose-reduced Abraxane D1/8 q21 days on 2/9/23, with restaging in July showing a continued partial response and restaging in November 2023 showed very mild oligoprogression, specifically to 1 lesion in the liver and one small pulmonary nodule.  -restaging CT c/a/p 1/6/24 shows continued enlargement of hepatic metastasis, though other sites of disease are stable, she was referred to radiation oncology to consider radiation to progressive liver metastasis.  S/p RT from 2/12/24 - 2/16/24.    -Defer cycle 18 Abraxane 75 mg/m2 with FDR Gemcitabine 600mg/m2/60min  -1L NS over 2 hours, ondansetron prn  -RTC in 1 week     # Supportive Care, pain management   -moderate cancer related pain initially, currently  improved and patient no longer having pain  -anxiety, Lexapro 5mg daily, pt also has ativan 0.5mg q12hr prn  -pruritus, etiology unclear, likely dry skin, prn montelukast 10 mg at bedtime  -CIPN with secondary insomnia, improved with gabapentin 100mg nightly   -constipation, stop OTC stool softener - name unknown.  Start polyethylene glycol.  If no relief after daily dose x 2 days, increase to BID  -Left lumbar pain with radiculopathy - Follows L1-L5.  Sclerotic lesion L1 stable.  DJD noted on CT.  Tylenol or Tramadol prn.  Could discuss treatment options with PCP, may need to be referred.  Elevate legs with pillows.      # Comorbidities.  -HTN, losartan  -asthma, albuterol, fluticasone  -HLD, simvastatin  -GERD, lansoprazole    # Emotional well being  -discussed today, the patient is aware of support systems available through the Cancer Center, currently no concerns or issues. The diagnosis, prognosis, treatment goals, plan for treatment, and anticipated response were explained to the patient.   -Advanced Care Planning: not discussed today    MDM High: malignancy; review of results with independent interpretation and discussion of management; drug therapy requiring intensive monitoring for toxicity;  ongoing continuity of complex care    Leonard Pascual PA-C    [facility] Hematology/Oncology Group  Rajwinder KINGSTON [facility]      This note was created using a voice-recognition transcribing system. Incorrect words or phrases may have been missed during proofreading. Please interpret accordingly.

## 2024-02-28 DIAGNOSIS — C25.9 ADENOCARCINOMA OF PANCREAS (HCC): Primary | ICD-10-CM

## 2024-02-29 ENCOUNTER — OFFICE VISIT (OUTPATIENT)
Dept: HEMATOLOGY/ONCOLOGY | Facility: HOSPITAL | Age: 79
End: 2024-02-29
Attending: STUDENT IN AN ORGANIZED HEALTH CARE EDUCATION/TRAINING PROGRAM
Payer: MEDICARE

## 2024-02-29 VITALS
TEMPERATURE: 98 F | OXYGEN SATURATION: 100 % | BODY MASS INDEX: 18.55 KG/M2 | RESPIRATION RATE: 16 BRPM | HEIGHT: 65 IN | WEIGHT: 111.31 LBS | SYSTOLIC BLOOD PRESSURE: 125 MMHG | DIASTOLIC BLOOD PRESSURE: 71 MMHG | HEART RATE: 91 BPM

## 2024-02-29 DIAGNOSIS — E87.6 HYPOKALEMIA DUE TO INADEQUATE POTASSIUM INTAKE: ICD-10-CM

## 2024-02-29 DIAGNOSIS — C25.9 ADENOCARCINOMA OF PANCREAS (HCC): Primary | ICD-10-CM

## 2024-02-29 DIAGNOSIS — E86.1 HYPOVOLEMIA: ICD-10-CM

## 2024-02-29 DIAGNOSIS — R11.0 NAUSEA: ICD-10-CM

## 2024-02-29 DIAGNOSIS — R53.1 GENERALIZED WEAKNESS: ICD-10-CM

## 2024-02-29 DIAGNOSIS — Z51.11 CHEMOTHERAPY MANAGEMENT, ENCOUNTER FOR: ICD-10-CM

## 2024-02-29 DIAGNOSIS — C25.9 ADENOCARCINOMA OF PANCREAS (HCC): ICD-10-CM

## 2024-02-29 LAB
ALBUMIN SERPL-MCNC: 4 G/DL (ref 3.2–4.8)
ALBUMIN/GLOB SERPL: 1.4 {RATIO} (ref 1–2)
ALP LIVER SERPL-CCNC: 106 U/L
ALT SERPL-CCNC: 11 U/L
ANION GAP SERPL CALC-SCNC: 8 MMOL/L (ref 0–18)
AST SERPL-CCNC: 22 U/L (ref ?–34)
BASOPHILS # BLD AUTO: 0.01 X10(3) UL (ref 0–0.2)
BASOPHILS NFR BLD AUTO: 0.2 %
BILIRUB SERPL-MCNC: 0.9 MG/DL (ref 0.2–1.1)
BUN BLD-MCNC: 15 MG/DL (ref 9–23)
BUN/CREAT SERPL: 20.8 (ref 10–20)
CALCIUM BLD-MCNC: 10.1 MG/DL (ref 8.7–10.4)
CANCER AG19-9 SERPL-ACNC: 4 U/ML (ref ?–35)
CHLORIDE SERPL-SCNC: 104 MMOL/L (ref 98–112)
CO2 SERPL-SCNC: 26 MMOL/L (ref 21–32)
CREAT BLD-MCNC: 0.72 MG/DL
DEPRECATED RDW RBC AUTO: 49.3 FL (ref 35.1–46.3)
EGFRCR SERPLBLD CKD-EPI 2021: 85 ML/MIN/1.73M2 (ref 60–?)
EOSINOPHIL # BLD AUTO: 0.01 X10(3) UL (ref 0–0.7)
EOSINOPHIL NFR BLD AUTO: 0.2 %
ERYTHROCYTE [DISTWIDTH] IN BLOOD BY AUTOMATED COUNT: 14.6 % (ref 11–15)
GLOBULIN PLAS-MCNC: 2.8 G/DL (ref 2.8–4.4)
GLUCOSE BLD-MCNC: 107 MG/DL (ref 70–99)
HCT VFR BLD AUTO: 36.9 %
HGB BLD-MCNC: 12.2 G/DL
IMM GRANULOCYTES # BLD AUTO: 0.01 X10(3) UL (ref 0–1)
IMM GRANULOCYTES NFR BLD: 0.2 %
LYMPHOCYTES # BLD AUTO: 0.42 X10(3) UL (ref 1–4)
LYMPHOCYTES NFR BLD AUTO: 8.9 %
MCH RBC QN AUTO: 30.4 PG (ref 26–34)
MCHC RBC AUTO-ENTMCNC: 33.1 G/DL (ref 31–37)
MCV RBC AUTO: 92 FL
MONOCYTES # BLD AUTO: 0.59 X10(3) UL (ref 0.1–1)
MONOCYTES NFR BLD AUTO: 12.6 %
NEUTROPHILS # BLD AUTO: 3.66 X10 (3) UL (ref 1.5–7.7)
NEUTROPHILS # BLD AUTO: 3.66 X10(3) UL (ref 1.5–7.7)
NEUTROPHILS NFR BLD AUTO: 77.9 %
OSMOLALITY SERPL CALC.SUM OF ELEC: 287 MOSM/KG (ref 275–295)
PLATELET # BLD AUTO: 111 10(3)UL (ref 150–450)
POTASSIUM SERPL-SCNC: 3.4 MMOL/L (ref 3.5–5.1)
PROT SERPL-MCNC: 6.8 G/DL (ref 5.7–8.2)
RBC # BLD AUTO: 4.01 X10(6)UL
SODIUM SERPL-SCNC: 138 MMOL/L (ref 136–145)
WBC # BLD AUTO: 4.7 X10(3) UL (ref 4–11)

## 2024-02-29 PROCEDURE — 99215 OFFICE O/P EST HI 40 MIN: CPT | Performed by: STUDENT IN AN ORGANIZED HEALTH CARE EDUCATION/TRAINING PROGRAM

## 2024-02-29 PROCEDURE — 86301 IMMUNOASSAY TUMOR CA 19-9: CPT

## 2024-02-29 PROCEDURE — G2211 COMPLEX E/M VISIT ADD ON: HCPCS | Performed by: STUDENT IN AN ORGANIZED HEALTH CARE EDUCATION/TRAINING PROGRAM

## 2024-02-29 PROCEDURE — 96375 TX/PRO/DX INJ NEW DRUG ADDON: CPT

## 2024-02-29 PROCEDURE — 96413 CHEMO IV INFUSION 1 HR: CPT

## 2024-02-29 PROCEDURE — 96417 CHEMO IV INFUS EACH ADDL SEQ: CPT

## 2024-02-29 PROCEDURE — 80053 COMPREHEN METABOLIC PANEL: CPT

## 2024-02-29 PROCEDURE — 85025 COMPLETE CBC W/AUTO DIFF WBC: CPT

## 2024-02-29 RX ORDER — HEPARIN SODIUM (PORCINE) LOCK FLUSH IV SOLN 100 UNIT/ML 100 UNIT/ML
5 SOLUTION INTRAVENOUS ONCE
Status: COMPLETED | OUTPATIENT
Start: 2024-02-29 | End: 2024-02-29

## 2024-02-29 RX ORDER — HEPARIN SODIUM (PORCINE) LOCK FLUSH IV SOLN 100 UNIT/ML 100 UNIT/ML
5 SOLUTION INTRAVENOUS ONCE
OUTPATIENT
Start: 2024-02-29

## 2024-02-29 RX ORDER — SODIUM CHLORIDE 9 MG/ML
10 INJECTION, SOLUTION INTRAMUSCULAR; INTRAVENOUS; SUBCUTANEOUS ONCE
OUTPATIENT
Start: 2024-02-29

## 2024-02-29 RX ORDER — HEPARIN SODIUM (PORCINE) LOCK FLUSH IV SOLN 100 UNIT/ML 100 UNIT/ML
SOLUTION INTRAVENOUS
Status: COMPLETED
Start: 2024-02-29 | End: 2024-02-29

## 2024-02-29 RX ADMIN — HEPARIN SODIUM (PORCINE) LOCK FLUSH IV SOLN 100 UNIT/ML 500 UNITS: 100 SOLUTION INTRAVENOUS at 17:03:00

## 2024-02-29 NOTE — PROGRESS NOTES
Pt here for C18D1 Drug(s) [place]/Abraxane.  Arrives Ambulating independently, accompanied by Self     Patient was evaluated today by MD.    Oral medications included in this regimen:  no    Patient confirms comprehension of cancer treatment schedule:  yes    Pregnancy screening:  Denies possibility of pregnancy    Modifications in dose or schedule:  No    Medications appearance and physical integrity checked by RN: yes.    Chemotherapy IV pump settings verified by 2 RNs:  Yes.  Frequency of blood return and site check throughout administration: Prior to administration, Prior to each drug, and At completion of therapy     Infusion/treatment outcome:  patient tolerated treatment without incident    Education Record    Learner:  Patient  Barriers / Limitations:  None  Method:  Discussion  Education / instructions given:  plan of care, treatment schedule   Outcome:  Shows understanding    Discharged Home, Ambulating independently, accompanied by:Self    Patient/family verbalized understanding of future appointments: by printed AVS  Care transferred to CHAYITO [place] @[id number]

## 2024-02-29 NOTE — PROGRESS NOTES
Rajwinder KINGSTON [facility]  Oncology Progress Note    Patient Name: Lucina Manzanares   YOB: 1945   Medical Record Number: [id number]    Chief Complaint: metastatic pancreatic cancer    Cancer Staging  Adenocarcinoma of pancreas (HCC)  Staging form: Exocrine Pancreas, AJCC 8th Edition  - Clinical stage from 1/25/2023: Stage IV (cTX, cNX, pM1) - Signed by Bartolo Dawkins DO on 1/30/2023    Oncology History Overview Note   Diagnosis: Metastatic MSI-S adenocarcinoma of the pancreas  Date of Dx: 1/25/23  Molecular: MSI-S  -somatic KRAS p.G12R, TP53, SMAD4  -germline TERT heterozygous VUS  Treatments:  -Gemcitabine, Abraxane    1/17/23: Initial medical oncology appointment. Briefly, the patient developed progressive left-sided abdominal pain in late November and was referred to gastroenterology (Dr. Hamm) who pursued imaging with a CT a/p noncontrast which revealed multiple hypoattenuating hepatic lesions, a solid and cystic pancreatic tail mass extending to the splenic hilum and splenomegaly with splenic vein occlusion.  Referred to medical oncology for further care. Discussed concern for pancreatic cancer, plan for CT c/a/p with contrast and IR biopsy.     1/21/23: CT c/a/p pancreatic tail mass with splenic vein occlusion, multiple liver metastases, bilateral lung nodules concerning for metastases and left upper quadrant omental nodularity suspicious for early carcinomatosis.     1/25/23: IR liver biopsy, PATH: MSI-S moderately differentiated adenocarcinoma of the pancreas. The tumor cells are positive for keratin AE1/AE3, CK7, CK20, , and CDX2, but are negative for GATA3 and TTF-1.    1/30/23: med onc followup. Discussed metastatic nature of disease, prognosis, palliative intent of therapy. Pt fit for gemcitabine/abraxane. Will start with dose-reduction given goals of care.   -2/6, port placement    2/9/23: C1D1 [place]/Abraxane  -2/16, tolerating well, cleared for D8, RTC 2 weeks. Treated for  strep pharyngitis.   3/23/23: tolerating well, cleared for C#3.     5/4/23: restaging CT shows partial response to treatment. Pt feeling weaker from chemo, continues to lose weight. Hold tx 1 week, reassess.     5/11/23: pt feeling stronger, gained 2-3 lbs. Will restart dose-reduced gem/abraxane, plan for chemo q2 weeks.     6/15/23: pt opted for q3 week dosing of [place]/Abraxane. Does very well during week 3. No pain, labs and PS adequate to continue therapy, continue q3 week dosing.     7/27/23: restaging CT c/a/p shows continued treatment response. Continue dose-reduced [place]/Abraxane every 3 weeks.     11/4/23: slightly growth to 1 hepatic lesion, continue chemotherapy, restage in 2 months.     1/11/24: restaging CT shows enlarging hepatic metastasis, but other areas of disease are stable. We will continue chemotherapy and plan for radiation to the hepatic metastasis.      Adenocarcinoma of pancreas (HCC)   1/25/2023 Cancer Staged    Staging form: Exocrine Pancreas, AJCC 8th Edition  - Clinical stage from 1/25/2023: Stage IV (cTX, cNX, pM1)     1/30/2023 Initial Diagnosis    Adenocarcinoma of pancreas (HCC)     2/9/2023 -  Chemotherapy    OP Albumin-bound PACLitaxel / Gemcitabine  Plan Provider: Bartolo Dawkins DO  Treatment goal: Palliative  Line of treatment: [No plan line of treatment]       Subjective:   Lucina Manzanares is a 78 year old female with a hx of HTN, asthma, and fibromyalgia who presents to medical oncology regarding metastatic MSI-S adenocarcinoma of the pancreas on frontline gemcitabine Abraxane as detailed above.  She has developed compiling toxicities mainly fatigue poor appetite and dysgeusia and so she is receiving dose reduced gemcitabine Abraxane every 3 weeks.    She is feeling better after the additional week off last week.  Eating better, gained some more weight.  Still having radicular pain down her left leg has been using some tramadol for this.  She is also taking gabapentin at bedtime  for neuropathy.    She denies mouth sores, cough, dyspnea, abdominal pain, diarrhea, peripheral edema and rash.     Review of Systems:  Hematology/Oncology ROS performed and negative except as above in HPI    History/Other:   Current Medications:   polyethylene glycol, PEG 3350, 17 GM/SCOOP Oral Powder Take 17 g by mouth daily. As needed for constipation.  May increase to twice daily as needed 578 g 1    ondansetron 8 MG Oral Tablet Dispersible Take 1 tablet (8 mg total) by mouth every 8 (eight) hours as needed for Nausea. 30 tablet 2    Simethicone (GAS-X MAXIMUM STRENGTH OR) Take by mouth.      gabapentin 100 MG Oral Cap Take 1 capsule (100 mg total) by mouth nightly. 90 capsule 1    montelukast 10 MG Oral Tab Take 1 tablet (10 mg total) by mouth nightly. 90 tablet 1    lansoprazole 30 MG Oral Capsule Delayed Release Take 1 capsule (30 mg total) by mouth before breakfast. 90 capsule 3    simvastatin 40 MG Oral Tab Take 1 tablet (40 mg total) by mouth every evening. 90 tablet 3    losartan 50 MG Oral Tab Take 1 tablet (50 mg total) by mouth daily. 90 tablet 3    Potassium Chloride ER 10 MEQ Oral Tab CR Take 2 tablets (20 mEq total) by mouth daily. For 1 week then as directed by provider 60 tablet 0    escitalopram 5 MG Oral Tab Take 1 tablet (5 mg total) by mouth daily. 30 tablet 1    lidocaine-prilocaine 2.5-2.5 % External Cream Apply to site 1 hour prior to port a cath needle insertion 30 g 1    ondansetron (ZOFRAN) 8 MG tablet Take 1 tablet (8 mg total) by mouth every 8 (eight) hours as needed for Nausea. 30 tablet 3    prochlorperazine (COMPAZINE) 10 mg tablet Take 1 tablet (10 mg total) by mouth every 8 (eight) hours as needed for Nausea. 30 tablet 3    traMADol 50 MG Oral Tab Take 1-2 tablets ( mg total) by mouth every 6 (six) hours as needed for Pain. 90 tablet 0    LORazepam 0.5 MG Oral Tab Take 1 tablet (0.5 mg total) by mouth every 12 (twelve) hours as needed for Anxiety. 60 tablet 0    Fluticasone  Propionate 50 MCG/ACT Nasal Suspension 2 sprays by Nasal route daily. (Patient taking differently: 2 sprays by Nasal route as needed.) 48 g 3    Albuterol Sulfate HFA (PROAIR HFA) 108 (90 Base) MCG/ACT Inhalation Aero Soln Inhale 1 puff into the lungs every 6 (six) hours as needed for Wheezing. 24 g 1     Allergies:   Allergies   Allergen Reactions    Ciprofloxacin JITTERY     Also got a headache    Cortisone      Other reaction(s): Rash       Objective:   Blood pressure 125/71, pulse 91, temperature 98 °F (36.7 °C), temperature source Oral, resp. rate 16, height 1.651 m (5' 5\"), weight 50.5 kg (111 lb 4.8 oz), SpO2 100%.  Physical Exam:  ECOG PS: 1  General: A&Ox3, cachetic, pale, NAD,   HEENT: EOMsi, Anicteric, OP clear  CV: RRR  Pulm: normal effort, CTA  Abd: soft, ntnd, bs+  Extremities: no edema  Neurological: grossly intact, normal sensation to light touch on fingers.       Results:   Labs:  Lab Results   Component Value Date    WBC 4.7 02/29/2024    HGB 12.2 02/29/2024    HCT 36.9 02/29/2024    .0 (L) 02/29/2024    CREATSERUM 0.72 02/29/2024    BUN 15 02/29/2024     02/29/2024    K 3.4 (L) 02/29/2024     02/29/2024    CO2 26.0 02/29/2024     (H) 02/29/2024    CA 10.1 02/29/2024    ALB 4.0 02/29/2024    ALKPHO 106 02/29/2024    BILT 0.9 02/29/2024    TP 6.8 02/29/2024    AST 22 02/29/2024    ALT 11 02/29/2024    INR 1.19 (H) 01/17/2023    TSH 1.18 08/10/2015    CRP <0.5 07/03/2016     Imaging:      Pathology:  Liver; core biopsy:   Moderately differentiated adenocarcinoma (see comment). MSI stable.   Comment:  Patient's recent CT of the chest, abdomen, and pelvis (1/21/23) is reported to demonstrate a 4.5 cm pancreatic tail mass which occludes the left splenic vein, multiple liver metastases, bilateral lung nodules, and left upper quadrant omental nodularity.  Sections of the liver biopsy demonstrate extensive involvement by moderately differentiated adenocarcinoma.  The tumor cells  are positive for keratin AE1/AE3, CK7, CK20, , and CDX2, but are negative for GATA3 and TTF-1.    Assessment & Plan:   Lucina Manzanares is a 78 year old female with a hx of HTN, asthma, and fibromyalgia who presents for medical oncology follow-up regarding metastatic MSI-S adenocarcinoma of the pancreas on frontline gemcitabine and abraxane.     # MSI-S moderately differentiated adenocarcinoma of the pancreas.   -began FDR [place] and dose-reduced Abraxane D1/8 q21 days on 2/9/23, with restaging in July showing a continued partial response and restaging in November 2023 showed very mild oligoprogression, specifically to 1 lesion in the liver and one small pulmonary nodule.  -restaging CT c/a/p 1/6/24 shows continued enlargement of hepatic metastasis, though other sites of disease are stable, she received radiation to progressive liver metastasis 2/12/24 - 2/16/24.  -Clinically improved after an additional week off, cleared for her next cycle of gemcitabine Abraxane  -RTC in 3 weeks, tentative plans for restaging in approximately 6 weeks early April    # Supportive Care, pain management   -moderate cancer related pain initially, currently improved and patient no longer having pain  -anxiety, Lexapro 5mg daily, pt also has ativan 0.5mg q12hr prn  -pruritus, etiology unclear, likely dry skin, prn montelukast 10 mg at bedtime  -CIPN with secondary insomnia, improved with gabapentin 100mg nightly     # Comorbidities.  -HTN, losartan  -asthma, albuterol, fluticasone  -HLD, simvastatin  -GERD, lansoprazole    # Emotional well being  -discussed today, the patient is aware of support systems available through the Cancer [facility], currently no concerns or issues. The diagnosis, prognosis, treatment goals, plan for treatment, and anticipated response were explained to the patient.   -Advanced Care Planning: not discussed today    MDM High: malignancy; review of results with independent interpretation and discussion of  management; drug therapy requiring intensive monitoring for toxicity;  ongoing continuity of complex care    Leonard Pascual PA-C    [facility]ematology/Oncology [facility]  Rajwinder KINGSTON [facility]      This note was created using a voice-recognition transcribing system. Incorrect words or phrases may have been missed during proofreading. Please interpret accordingly.   Siliq Counseling:  I discussed with the patient the risks of Siliq including but not limited to new or worsening depression, suicidal thoughts and behavior, immunosuppression, malignancy, posterior leukoencephalopathy syndrome, and serious infections.  The patient understands that monitoring is required including a PPD at baseline and must alert us or the primary physician if symptoms of infection or other concerning signs are noted. There is also a special program designed to monitor depression which is required with Siliq.

## 2024-03-07 ENCOUNTER — TELEPHONE (OUTPATIENT)
Dept: INTERNAL MEDICINE CLINIC | Facility: CLINIC | Age: 79
End: 2024-03-07

## 2024-03-07 NOTE — TELEPHONE ENCOUNTER
Spoke with patient and she is not feeling well enough to come in for a ma visit due to her chemotherapy.

## 2024-03-09 ENCOUNTER — HOSPITAL ENCOUNTER (EMERGENCY)
Facility: HOSPITAL | Age: 79
Discharge: HOME | End: 2024-03-09
Attending: EMERGENCY MEDICINE
Payer: MEDICARE

## 2024-03-09 VITALS
HEART RATE: 74 BPM | SYSTOLIC BLOOD PRESSURE: 131 MMHG | OXYGEN SATURATION: 100 % | WEIGHT: 104 LBS | DIASTOLIC BLOOD PRESSURE: 62 MMHG | BODY MASS INDEX: 17 KG/M2 | TEMPERATURE: 97 F | RESPIRATION RATE: 19 BRPM

## 2024-03-09 DIAGNOSIS — Z79.899 PALLIATIVE CHEMOTHERAPY UNDERWAY: ICD-10-CM

## 2024-03-09 DIAGNOSIS — R43.2 DYSGEUSIA: Primary | ICD-10-CM

## 2024-03-09 LAB
ALBUMIN SERPL-MCNC: 4 G/DL (ref 3.2–4.8)
ALBUMIN/GLOB SERPL: 1.4 {RATIO} (ref 1–2)
ALP LIVER SERPL-CCNC: 102 U/L
ALT SERPL-CCNC: 13 U/L
ANION GAP SERPL CALC-SCNC: 5 MMOL/L (ref 0–18)
AST SERPL-CCNC: 27 U/L (ref ?–34)
BASOPHILS # BLD AUTO: 0.02 X10(3) UL (ref 0–0.2)
BASOPHILS NFR BLD AUTO: 0.5 %
BILIRUB SERPL-MCNC: 0.8 MG/DL (ref 0.2–1.1)
BUN BLD-MCNC: 11 MG/DL (ref 9–23)
BUN/CREAT SERPL: 12.8 (ref 10–20)
CALCIUM BLD-MCNC: 10.2 MG/DL (ref 8.7–10.4)
CHLORIDE SERPL-SCNC: 104 MMOL/L (ref 98–112)
CO2 SERPL-SCNC: 28 MMOL/L (ref 21–32)
CREAT BLD-MCNC: 0.86 MG/DL
DEPRECATED RDW RBC AUTO: 45.4 FL (ref 35.1–46.3)
EGFRCR SERPLBLD CKD-EPI 2021: 69 ML/MIN/1.73M2 (ref 60–?)
EOSINOPHIL # BLD AUTO: 0.01 X10(3) UL (ref 0–0.7)
EOSINOPHIL NFR BLD AUTO: 0.3 %
ERYTHROCYTE [DISTWIDTH] IN BLOOD BY AUTOMATED COUNT: 14 % (ref 11–15)
FLUAV + FLUBV RNA SPEC NAA+PROBE: NEGATIVE
FLUAV + FLUBV RNA SPEC NAA+PROBE: NEGATIVE
GLOBULIN PLAS-MCNC: 2.9 G/DL (ref 2.8–4.4)
GLUCOSE BLD-MCNC: 105 MG/DL (ref 70–99)
HCT VFR BLD AUTO: 36.2 %
HGB BLD-MCNC: 12.8 G/DL
IMM GRANULOCYTES # BLD AUTO: 0.02 X10(3) UL (ref 0–1)
IMM GRANULOCYTES NFR BLD: 0.5 %
LYMPHOCYTES # BLD AUTO: 0.49 X10(3) UL (ref 1–4)
LYMPHOCYTES NFR BLD AUTO: 13.1 %
MCH RBC QN AUTO: 31.6 PG (ref 26–34)
MCHC RBC AUTO-ENTMCNC: 35.4 G/DL (ref 31–37)
MCV RBC AUTO: 89.4 FL
MONOCYTES # BLD AUTO: 0.49 X10(3) UL (ref 0.1–1)
MONOCYTES NFR BLD AUTO: 13.1 %
NEUTROPHILS # BLD AUTO: 2.72 X10 (3) UL (ref 1.5–7.7)
NEUTROPHILS # BLD AUTO: 2.72 X10(3) UL (ref 1.5–7.7)
NEUTROPHILS NFR BLD AUTO: 72.5 %
OSMOLALITY SERPL CALC.SUM OF ELEC: 284 MOSM/KG (ref 275–295)
PLATELET # BLD AUTO: 74 10(3)UL (ref 150–450)
POTASSIUM SERPL-SCNC: 3.5 MMOL/L (ref 3.5–5.1)
PROT SERPL-MCNC: 6.9 G/DL (ref 5.7–8.2)
RBC # BLD AUTO: 4.05 X10(6)UL
RSV RNA SPEC NAA+PROBE: NEGATIVE
SARS-COV-2 RNA RESP QL NAA+PROBE: NOT DETECTED
SODIUM SERPL-SCNC: 137 MMOL/L (ref 136–145)
WBC # BLD AUTO: 3.8 X10(3) UL (ref 4–11)

## 2024-03-09 PROCEDURE — 96361 HYDRATE IV INFUSION ADD-ON: CPT

## 2024-03-09 PROCEDURE — 0241U SARS-COV-2/FLU A AND B/RSV BY PCR (GENEXPERT): CPT | Performed by: EMERGENCY MEDICINE

## 2024-03-09 PROCEDURE — 96360 HYDRATION IV INFUSION INIT: CPT

## 2024-03-09 PROCEDURE — 99284 EMERGENCY DEPT VISIT MOD MDM: CPT

## 2024-03-09 PROCEDURE — 80053 COMPREHEN METABOLIC PANEL: CPT | Performed by: EMERGENCY MEDICINE

## 2024-03-09 PROCEDURE — 85025 COMPLETE CBC W/AUTO DIFF WBC: CPT | Performed by: EMERGENCY MEDICINE

## 2024-03-09 NOTE — ED INITIAL ASSESSMENT (HPI)
Lucina arrived through triage with her daughter for c/o generalized weakness / fatigue and lightheadedness. Reports poor appetite since last chemotherapy treatment on 2/29. Denies fever or chills.

## 2024-03-09 NOTE — ED PROVIDER NOTES
Patient Seen in: [facility] Emergency Department    History     Chief Complaint   Patient presents with    Dehydration    Fatigue     Stated Complaint: dehydration/ CA pt     HPI    77 yo F with PMH pancreatic CA on palliative chemotherapy presenting with complaints of bitter/metallic taste and decreased oral intake over the past week status post chemotherapy one week ago. Patient typically noting similar taste alteration in similar timing/one week after chemo though with more profound dysgeusia and decreased oral intake. No vomiting/diarrhea, no nausea. No urinary complaints. No fevers/chills. No new meds/dosage changes. No CP/SOB. No new pain.    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    GERD (gastroesophageal reflux disease)     Headache     Hiatal hernia     High cholesterol     Marginal ulcer 2008    Staph marginal ulcer @ 2 oclock, OS, Dx'd 8-18-08    Migraines     Tinnitus     Vertigo        Past Surgical History:   Procedure Laterality Date    APPENDECTOMY      COLONOSCOPY      FOOT SURGERY      bunionectomy    HERNIA SURGERY      hernia repair    HIP REPLACEMENT SURGERY  2010    INJ TENDON/LIGAMENT/CYST      Injection Tendon Sheath, Ligament, SOCRATES A FRANSISCA    TONSILLECTOMY              Family History   Problem Relation Age of Onset    Allergies Other         family h/o    Stroke Other         CVA, family h/o    Diabetes Other         family h/o    Migraines Other         family h/o    Heart Disease Other         family h/o    Thyroid Disorder Brother     Obesity Mother     Glaucoma Mother        Social History     Socioeconomic History    Marital status:    Tobacco Use    Smoking status: Former    Smokeless tobacco: Never   Substance and Sexual Activity    Alcohol use: Not Currently     Comment:  occasionally    Drug use: Never   Other Topics Concern    Caffeine Concern Yes     Comment: coffee, tea, 4 cups daily       Review of Systems :  Constitutional: As per HPI  HENT: Negative for ear pain and rhinorrhea.  (+) altered taste.  Eyes: Negative for discharge and visual disturbance.     Positive for stated complaint: dehydration/ CA pt  Other systems are as noted in HPI.  Constitutional and vital signs reviewed.      All other systems reviewed and negative except as noted above.    PSFH elements reviewed from today and agreed except as otherwise stated in HPI.    Physical Exam     ED Triage Vitals [03/09/24 1448]   /65   Pulse 89   Resp (!) 224   Temp 97.4 °F (36.3 °C)   Temp src Temporal   SpO2 100 %   O2 Device None (Room air)       Current:/65   Pulse 89   Temp 97.4 °F (36.3 °C) (Temporal)   Resp (!) 224   Wt 47.2 kg   SpO2 100%   BMI 17.31 kg/m²         Physical Exam   Constitutional: No distress.   HEENT: Dry MM.  Head: Normocephalic.   Eyes: No injection.   Cardiovascular: RRR.   Pulmonary/Chest: Effort normal. CTAB.  Abdominal: Soft. Nontender.  Musculoskeletal: No gross deformity.  Neurological: Alert.   Skin: Skin is warm.   Psychiatric: Cooperative.  Nursing note and vitals reviewed.        ED Course     Labs Reviewed   COMP METABOLIC PANEL (14) - Abnormal; Notable for the following components:       Result Value    Glucose 105 (*)     All other components within normal limits   CBC W/ DIFFERENTIAL - Abnormal; Notable for the following components:    WBC 3.8 (*)     PLT 74.0 (*)     Lymphocyte Absolute 0.49 (*)     All other components within normal limits   SARS-COV-2/FLU A AND B/RSV BY PCR (GENEXPERT) - Normal    Narrative:     This test is intended for the qualitative detection and differentiation of SARS-CoV-2, influenza A, influenza B, and respiratory syncytial virus (RSV) viral RNA in nasopharyngeal or nares swabs from individuals suspected of respiratory viral infection  consistent with COVID-19 by their healthcare provider. Signs and symptoms of respiratory viral infection due to SARS-CoV-2, influenza, and RSV can be similar.    Test performed using the Xpert Xpress SARS-CoV-2/FLU/RSV (real time RT-PCR)  assay on the GeneXpert instrument, [employer], [place] [address].   This test is being used under the Food and Drug Administration's Emergency Use Authorization.    The authorized Fact Sheet for Healthcare Providers for this assay is available upon request from the laboratory.   CBC WITH DIFFERENTIAL WITH PLATELET    Narrative:     The following orders were created for panel order CBC With Differential With Platelet.  Procedure                               Abnormality         Status                     ---------                               -----------         ------                     CBC W/ DIFFERENTIAL[[id number]]          Abnormal            Final result                 Please view results for these tests on the individual orders.   SCAN SLIDE   RAINBOW DRAW LAVENDER   RAINBOW DRAW LIGHT GREEN   RAINBOW DRAW BLUE   RAINBOW DRAW GOLD       ED Course as of 03/09/24 1913  ------------------------------------------------------------  Time: 03/09 1716  Comment: Requesting food, ED course nonacute.  ------------------------------------------------------------  Time: 03/09 1818  Comment: Resting comfortably and tolerating oral intake, patient/daughter updated regarding labs and comfortable with discharge plan of care.       MDM   DIFFERENTIAL DIAGNOSIS: After history and physical exam differential diagnosis includes but is not limited to electrolyte derangement, AZAR, COVID-19.    Pulse ox: 100%:Normal on RA, as independently interpreted by myself    Medical Decision Making  Evaluation for worsening of typical postchemotherapy dygeusia without somatic complaints otherwise; IV fluids initiated with symptomatic improvement and tolerating oral intake in setting of nonacute labs - stable  for discharge with ongoing outpatient followup for which patient/family comfortable with plan of care.    Problems Addressed:  Dysgeusia: acute illness or injury  Palliative chemotherapy underway: chronic illness or injury with exacerbation, progression, or side effects of treatment    Amount and/or Complexity of Data Reviewed  External Data Reviewed: notes.     Details: 2/29/2024 oncology notes/labs reviewed  Labs: ordered. Decision-making details documented in ED Course.        I was wearing at minimum a facemask and eye protection throughout this encounter with handwashing performed prior and after patient evaluation without personal hand/facial/oropharyngeal contact and gloves worn throughout encounter. See note and/or contact this provider for further PPE details.    Disposition and Plan     Clinical Impression:  1. Dysgeusia    2. Palliative chemotherapy underway        Disposition:  Discharge    Follow-up:  Ирина Perez MD  [address] [facility] [address] [phone]    Call  For followup and re-evaluation.    Bartolo Dawkins, DO  [address] [facility] [id number] [phone]    Follow up        Medications Prescribed:  Discharge Medication List as of 3/9/2024  6:24 PM

## 2024-03-13 ENCOUNTER — PATIENT OUTREACH (OUTPATIENT)
Dept: CASE MANAGEMENT | Age: 79
End: 2024-03-13

## 2024-03-13 NOTE — PROGRESS NOTES
1st attempt ER f/up apt request  PCP -decline, pt doesn't want to schedule at this time  HEM/ONC -existing apt (3/21)  Closing encounter

## 2024-03-21 ENCOUNTER — OFFICE VISIT (OUTPATIENT)
Dept: HEMATOLOGY/ONCOLOGY | Facility: HOSPITAL | Age: 79
End: 2024-03-21
Attending: STUDENT IN AN ORGANIZED HEALTH CARE EDUCATION/TRAINING PROGRAM
Payer: MEDICARE

## 2024-03-21 VITALS
HEART RATE: 90 BPM | OXYGEN SATURATION: 99 % | DIASTOLIC BLOOD PRESSURE: 81 MMHG | TEMPERATURE: 97 F | BODY MASS INDEX: 16.93 KG/M2 | RESPIRATION RATE: 16 BRPM | SYSTOLIC BLOOD PRESSURE: 120 MMHG | WEIGHT: 101.63 LBS | HEIGHT: 65 IN

## 2024-03-21 DIAGNOSIS — G62.0 PERIPHERAL NEUROPATHY DUE TO CHEMOTHERAPY (HCC): ICD-10-CM

## 2024-03-21 DIAGNOSIS — Z51.11 CHEMOTHERAPY MANAGEMENT, ENCOUNTER FOR: ICD-10-CM

## 2024-03-21 DIAGNOSIS — E86.1 HYPOVOLEMIA: ICD-10-CM

## 2024-03-21 DIAGNOSIS — R53.1 GENERALIZED WEAKNESS: ICD-10-CM

## 2024-03-21 DIAGNOSIS — T45.1X5A PERIPHERAL NEUROPATHY DUE TO CHEMOTHERAPY (HCC): ICD-10-CM

## 2024-03-21 DIAGNOSIS — E87.6 HYPOKALEMIA DUE TO INADEQUATE POTASSIUM INTAKE: ICD-10-CM

## 2024-03-21 DIAGNOSIS — C25.9 ADENOCARCINOMA OF PANCREAS (HCC): Primary | ICD-10-CM

## 2024-03-21 DIAGNOSIS — M62.838 MUSCLE SPASM: ICD-10-CM

## 2024-03-21 DIAGNOSIS — R11.0 NAUSEA: ICD-10-CM

## 2024-03-21 LAB
ALBUMIN SERPL-MCNC: 4 G/DL (ref 3.2–4.8)
ALBUMIN/GLOB SERPL: 1.4 {RATIO} (ref 1–2)
ALP LIVER SERPL-CCNC: 111 U/L
ALT SERPL-CCNC: 10 U/L
ANION GAP SERPL CALC-SCNC: 4 MMOL/L (ref 0–18)
AST SERPL-CCNC: 24 U/L (ref ?–34)
BASOPHILS # BLD AUTO: 0.02 X10(3) UL (ref 0–0.2)
BASOPHILS NFR BLD AUTO: 0.4 %
BILIRUB SERPL-MCNC: 1.1 MG/DL (ref 0.2–1.1)
BUN BLD-MCNC: 11 MG/DL (ref 9–23)
BUN/CREAT SERPL: 13.8 (ref 10–20)
CALCIUM BLD-MCNC: 10.2 MG/DL (ref 8.7–10.4)
CANCER AG19-9 SERPL-ACNC: 12.7 U/ML (ref ?–35)
CHLORIDE SERPL-SCNC: 101 MMOL/L (ref 98–112)
CO2 SERPL-SCNC: 30 MMOL/L (ref 21–32)
CREAT BLD-MCNC: 0.8 MG/DL
DEPRECATED RDW RBC AUTO: 48.6 FL (ref 35.1–46.3)
EGFRCR SERPLBLD CKD-EPI 2021: 75 ML/MIN/1.73M2 (ref 60–?)
EOSINOPHIL # BLD AUTO: 0.02 X10(3) UL (ref 0–0.7)
EOSINOPHIL NFR BLD AUTO: 0.4 %
ERYTHROCYTE [DISTWIDTH] IN BLOOD BY AUTOMATED COUNT: 14.8 % (ref 11–15)
GLOBULIN PLAS-MCNC: 2.8 G/DL (ref 2.8–4.4)
GLUCOSE BLD-MCNC: 106 MG/DL (ref 70–99)
HCT VFR BLD AUTO: 38.1 %
HGB BLD-MCNC: 13.4 G/DL
IMM GRANULOCYTES # BLD AUTO: 0.02 X10(3) UL (ref 0–1)
IMM GRANULOCYTES NFR BLD: 0.4 %
LYMPHOCYTES # BLD AUTO: 0.53 X10(3) UL (ref 1–4)
LYMPHOCYTES NFR BLD AUTO: 9.6 %
MCH RBC QN AUTO: 31.4 PG (ref 26–34)
MCHC RBC AUTO-ENTMCNC: 35.2 G/DL (ref 31–37)
MCV RBC AUTO: 89.2 FL
MONOCYTES # BLD AUTO: 0.71 X10(3) UL (ref 0.1–1)
MONOCYTES NFR BLD AUTO: 12.9 %
NEUTROPHILS # BLD AUTO: 4.2 X10 (3) UL (ref 1.5–7.7)
NEUTROPHILS # BLD AUTO: 4.2 X10(3) UL (ref 1.5–7.7)
NEUTROPHILS NFR BLD AUTO: 76.3 %
OSMOLALITY SERPL CALC.SUM OF ELEC: 280 MOSM/KG (ref 275–295)
PLATELET # BLD AUTO: 177 10(3)UL (ref 150–450)
POTASSIUM SERPL-SCNC: 3 MMOL/L (ref 3.5–5.1)
PROT SERPL-MCNC: 6.8 G/DL (ref 5.7–8.2)
RBC # BLD AUTO: 4.27 X10(6)UL
SODIUM SERPL-SCNC: 135 MMOL/L (ref 136–145)
WBC # BLD AUTO: 5.5 X10(3) UL (ref 4–11)

## 2024-03-21 PROCEDURE — 96375 TX/PRO/DX INJ NEW DRUG ADDON: CPT

## 2024-03-21 PROCEDURE — 85025 COMPLETE CBC W/AUTO DIFF WBC: CPT

## 2024-03-21 PROCEDURE — 86301 IMMUNOASSAY TUMOR CA 19-9: CPT

## 2024-03-21 PROCEDURE — G2211 COMPLEX E/M VISIT ADD ON: HCPCS | Performed by: STUDENT IN AN ORGANIZED HEALTH CARE EDUCATION/TRAINING PROGRAM

## 2024-03-21 PROCEDURE — 99215 OFFICE O/P EST HI 40 MIN: CPT | Performed by: STUDENT IN AN ORGANIZED HEALTH CARE EDUCATION/TRAINING PROGRAM

## 2024-03-21 PROCEDURE — 80053 COMPREHEN METABOLIC PANEL: CPT

## 2024-03-21 PROCEDURE — 96417 CHEMO IV INFUS EACH ADDL SEQ: CPT

## 2024-03-21 PROCEDURE — 96413 CHEMO IV INFUSION 1 HR: CPT

## 2024-03-21 RX ORDER — POTASSIUM CHLORIDE 750 MG/1
20 TABLET, FILM COATED, EXTENDED RELEASE ORAL DAILY
Qty: 60 TABLET | Refills: 1 | Status: SHIPPED | OUTPATIENT
Start: 2024-03-21

## 2024-03-21 RX ORDER — HEPARIN SODIUM (PORCINE) LOCK FLUSH IV SOLN 100 UNIT/ML 100 UNIT/ML
5 SOLUTION INTRAVENOUS ONCE
Status: COMPLETED | OUTPATIENT
Start: 2024-03-21 | End: 2024-03-21

## 2024-03-21 RX ORDER — SODIUM CHLORIDE 9 MG/ML
10 INJECTION, SOLUTION INTRAMUSCULAR; INTRAVENOUS; SUBCUTANEOUS ONCE
OUTPATIENT
Start: 2024-03-21

## 2024-03-21 RX ORDER — HEPARIN SODIUM (PORCINE) LOCK FLUSH IV SOLN 100 UNIT/ML 100 UNIT/ML
SOLUTION INTRAVENOUS
Status: COMPLETED
Start: 2024-03-21 | End: 2024-03-21

## 2024-03-21 RX ORDER — HEPARIN SODIUM (PORCINE) LOCK FLUSH IV SOLN 100 UNIT/ML 100 UNIT/ML
SOLUTION INTRAVENOUS
Status: DISCONTINUED
Start: 2024-03-21 | End: 2024-03-21

## 2024-03-21 RX ORDER — CYCLOBENZAPRINE HCL 5 MG
5 TABLET ORAL 3 TIMES DAILY PRN
Qty: 60 TABLET | Refills: 0 | Status: SHIPPED | OUTPATIENT
Start: 2024-03-21

## 2024-03-21 RX ORDER — HEPARIN SODIUM (PORCINE) LOCK FLUSH IV SOLN 100 UNIT/ML 100 UNIT/ML
5 SOLUTION INTRAVENOUS ONCE
OUTPATIENT
Start: 2024-03-21

## 2024-03-21 RX ADMIN — HEPARIN SODIUM (PORCINE) LOCK FLUSH IV SOLN 100 UNIT/ML 500 UNITS: 100 SOLUTION INTRAVENOUS at 17:43:00

## 2024-03-21 NOTE — PROGRESS NOTES
Pt here for C19D1 abraxane + gemzar  Potassium level 3.0-oral replacement per script sent to patient's pharmacy by Leonard GARCIA    Patient was evaluated today by     Oral medications included in this regimen:  no    Patient confirms comprehension of cancer treatment schedule:  yes    Pregnancy screening:  Not applicable    Modifications in dose or schedule:  No    Medications appearance and physical integrity checked by RN: yes.    Chemotherapy IV pump settings verified by 2 RNs:  Yes.  Frequency of blood return and site check throughout administration: Prior to administration, Prior to each drug, and At completion of therapy     Infusion/treatment outcome:  patient tolerated treatment without incident    Education Record    Learner:  Patient  Barriers / Limitations:  None  Method:  Discussion  Education / instructions given:  plan of care  Outcome:  Shows understanding    Discharged Other stable from infusion ,     Patient/family verbalized understanding of future appointments: by printed AVS

## 2024-03-21 NOTE — PROGRESS NOTES
[facility]  Oncology Progress Note    Patient Name: Lucina Manzanares   YOB: 1945   Medical Record Number: [id number]    Chief Complaint: metastatic pancreatic cancer    Cancer Staging  Adenocarcinoma of pancreas (HCC)  Staging form: Exocrine Pancreas, AJCC 8th Edition  - Clinical stage from 1/25/2023: Stage IV (cTX, cNX, pM1) - Signed by Bartolo Dawkins DO on 1/30/2023    Oncology History Overview Note   Diagnosis: Metastatic MSI-S adenocarcinoma of the pancreas  Date of Dx: 1/25/23  Molecular: MSI-S  -somatic KRAS p.G12R, TP53, SMAD4  -germline TERT heterozygous VUS  Treatments:  -Gemcitabine, Abraxane    1/17/23: Initial medical oncology appointment. Briefly, the patient developed progressive left-sided abdominal pain in late November and was referred to gastroenterology (Dr. Hamm) who pursued imaging with a CT a/p noncontrast which revealed multiple hypoattenuating hepatic lesions, a solid and cystic pancreatic tail mass extending to the splenic hilum and splenomegaly with splenic vein occlusion.  Referred to medical oncology for further care. Discussed concern for pancreatic cancer, plan for CT c/a/p with contrast and IR biopsy.     1/21/23: CT c/a/p pancreatic tail mass with splenic vein occlusion, multiple liver metastases, bilateral lung nodules concerning for metastases and left upper quadrant omental nodularity suspicious for early carcinomatosis.     1/25/23: IR liver biopsy, PATH: MSI-S moderately differentiated adenocarcinoma of the pancreas. The tumor cells are positive for keratin AE1/AE3, CK7, CK20, , and CDX2, but are negative for GATA3 and TTF-1.    1/30/23: med onc followup. Discussed metastatic nature of disease, prognosis, palliative intent of therapy. Pt fit for gemcitabine/abraxane. Will start with dose-reduction given goals of care.   -2/6, port placement    2/9/23: C1D1 [place]/Abraxane  -2/16, tolerating well, cleared for D8, RTC 2 weeks. Treated for  strep pharyngitis.   3/23/23: tolerating well, cleared for C#3.     5/4/23: restaging CT shows partial response to treatment. Pt feeling weaker from chemo, continues to lose weight. Hold tx 1 week, reassess.     5/11/23: pt feeling stronger, gained 2-3 lbs. Will restart dose-reduced gem/abraxane, plan for chemo q2 weeks.     6/15/23: pt opted for q3 week dosing of [place]/Abraxane. Does very well during week 3. No pain, labs and PS adequate to continue therapy, continue q3 week dosing.     7/27/23: restaging CT c/a/p shows continued treatment response. Continue dose-reduced Cameron/Abraxane every 3 weeks.     11/4/23: slightly growth to 1 hepatic lesion, continue chemotherapy, restage in 2 months.     1/11/24: restaging CT shows enlarging hepatic metastasis, but other areas of disease are stable. We will continue chemotherapy and plan for radiation to the hepatic metastasis.      Adenocarcinoma of pancreas (HCC)   1/25/2023 Cancer Staged    Staging form: Exocrine Pancreas, AJCC 8th Edition  - Clinical stage from 1/25/2023: Stage IV (cTX, cNX, pM1)     1/30/2023 Initial Diagnosis    Adenocarcinoma of pancreas (HCC)     2/9/2023 -  Chemotherapy    OP Albumin-bound PACLitaxel / Gemcitabine  Plan Provider: Bartolo Dawkins DO  Treatment goal: Palliative  Line of treatment: [No plan line of treatment]       Subjective:   Lucina Manzanares is a 78 year old female with a hx of HTN, asthma, and fibromyalgia who presents to medical oncology regarding metastatic MSI-S adenocarcinoma of the pancreas on frontline gemcitabine Abraxane as detailed above.  She has developed compiling toxicities mainly fatigue poor appetite and dysgeusia and so she is receiving dose reduced gemcitabine Abraxane every 3 weeks.    The patient had more dysgeusia this last cycle.  She went to the ER with poor p.o. intake and dehydration.  She required IV fluids but was discharged from the ER.    Review of Systems:  Hematology/Oncology ROS performed and  negative except as above in HPI    History/Other:   Current Medications:   cyclobenzaprine 5 MG Oral Tab Take 1 tablet (5 mg total) by mouth 3 (three) times daily as needed for Muscle spasms. 60 tablet 0    polyethylene glycol, PEG 3350, 17 GM/SCOOP Oral Powder Take 17 g by mouth daily. As needed for constipation.  May increase to twice daily as needed 578 g 1    Simethicone (GAS-X MAXIMUM STRENGTH OR) Take by mouth.      gabapentin 100 MG Oral Cap Take 1 capsule (100 mg total) by mouth nightly. 90 capsule 1    montelukast 10 MG Oral Tab Take 1 tablet (10 mg total) by mouth nightly. 90 tablet 1    lansoprazole 30 MG Oral Capsule Delayed Release Take 1 capsule (30 mg total) by mouth before breakfast. 90 capsule 3    simvastatin 40 MG Oral Tab Take 1 tablet (40 mg total) by mouth every evening. 90 tablet 3    losartan 50 MG Oral Tab Take 1 tablet (50 mg total) by mouth daily. 90 tablet 3    lidocaine-prilocaine 2.5-2.5 % External Cream Apply to site 1 hour prior to port a cath needle insertion 30 g 1    ondansetron (ZOFRAN) 8 MG tablet Take 1 tablet (8 mg total) by mouth every 8 (eight) hours as needed for Nausea. 30 tablet 3    prochlorperazine (COMPAZINE) 10 mg tablet Take 1 tablet (10 mg total) by mouth every 8 (eight) hours as needed for Nausea. 30 tablet 3    traMADol 50 MG Oral Tab Take 1-2 tablets ( mg total) by mouth every 6 (six) hours as needed for Pain. 90 tablet 0     Allergies:   Allergies   Allergen Reactions    Ciprofloxacin JITTERY     Also got a headache    Cortisone      Other reaction(s): Rash       Objective:   Blood pressure 120/81, pulse 90, temperature 97.3 °F (36.3 °C), temperature source Oral, resp. rate 16, height 1.651 m (5' 5\"), weight 46.1 kg (101 lb 9.6 oz), SpO2 99%.  Physical Exam:  ECOG PS: 1  General: A&Ox3, cachetic, pale, NAD,   HEENT: EOMsi, Anicteric, OP clear  CV: RRR  Pulm: normal effort, CTA  Abd: soft, ntnd, bs+  Extremities: no edema  Neurological: grossly intact,  normal sensation to light touch on fingers.       Results:   Labs:  Lab Results   Component Value Date    WBC 5.5 03/21/2024    HGB 13.4 03/21/2024    HCT 38.1 03/21/2024    .0 03/21/2024    CREATSERUM 0.80 03/21/2024    BUN 11 03/21/2024     (L) 03/21/2024    K 3.0 (L) 03/21/2024     03/21/2024    CO2 30.0 03/21/2024     (H) 03/21/2024    CA 10.2 03/21/2024    ALB 4.0 03/21/2024    ALKPHO 111 03/21/2024    BILT 1.1 03/21/2024    TP 6.8 03/21/2024    AST 24 03/21/2024    ALT 10 03/21/2024    INR 1.19 (H) 01/17/2023    TSH 1.18 08/10/2015    CRP <0.5 07/03/2016     Imaging:      Pathology:  Liver; core biopsy:   Moderately differentiated adenocarcinoma (see comment). MSI stable.   Comment:  Patient's recent CT of the chest, abdomen, and pelvis (1/21/23) is reported to demonstrate a 4.5 cm pancreatic tail mass which occludes the left splenic vein, multiple liver metastases, bilateral lung nodules, and left upper quadrant omental nodularity.  Sections of the liver biopsy demonstrate extensive involvement by moderately differentiated adenocarcinoma.  The tumor cells are positive for keratin AE1/AE3, CK7, CK20, , and CDX2, but are negative for GATA3 and TTF-1.    Assessment & Plan:   Lucina Manzanares is a 78 year old female with a hx of HTN, asthma, and fibromyalgia who presents for medical oncology follow-up regarding metastatic MSI-S adenocarcinoma of the pancreas on frontline gemcitabine and abraxane.     # MSI-S moderately differentiated adenocarcinoma of the pancreas.   -began FDR [place] and dose-reduced Abraxane D1/8 q21 days on 2/9/23, with restaging in July showing a continued partial response and restaging in November 2023 showed very mild oligoprogression, specifically to 1 lesion in the liver and one small pulmonary nodule.  -restaging CT c/a/p 1/6/24 shows continued enlargement of hepatic metastasis, though other sites of disease are stable, she received radiation to progressive  liver metastasis 2/12/24 - 2/16/24.  -Cleared for next cycle of chemo, follow-up in 3 weeks with restaging  -Increasing weight loss, I do fear we may be reaching the limitations of chemotherapy but we will address after our restaging scan    # Supportive Care, pain management   -moderate cancer related pain initially, currently improved and patient no longer having pain  -anxiety, Lexapro 5mg daily, pt also has ativan 0.5mg q12hr prn  -pruritus, etiology unclear, likely dry skin, prn montelukast 10 mg at bedtime  -CIPN with secondary insomnia, improved with gabapentin 100mg nightly   -Cyclobenzaprine as needed for gluteal muscle spasms    # Comorbidities.  -HTN, losartan  -asthma, albuterol, fluticasone  -HLD, simvastatin  -GERD, lansoprazole    # Emotional well being  -discussed today, the patient is aware of support systems available through the Cancer Center, currently no concerns or issues. The diagnosis, prognosis, treatment goals, plan for treatment, and anticipated response were explained to the patient.   -Advanced Care Planning: not discussed today    MDM High: malignancy; review of results with independent interpretation and discussion of management; drug therapy requiring intensive monitoring for toxicity;  ongoing continuity of complex care    Bartolo Dawkins DO    [facility]  [facility]    This note was created using a voice-recognition transcribing system. Incorrect words or phrases may have been missed during proofreading. Please interpret accordingly.

## 2024-03-28 ENCOUNTER — OFFICE VISIT (OUTPATIENT)
Dept: HEMATOLOGY/ONCOLOGY | Facility: HOSPITAL | Age: 79
End: 2024-03-28
Attending: STUDENT IN AN ORGANIZED HEALTH CARE EDUCATION/TRAINING PROGRAM
Payer: MEDICARE

## 2024-03-28 ENCOUNTER — TELEPHONE (OUTPATIENT)
Dept: HEMATOLOGY/ONCOLOGY | Facility: HOSPITAL | Age: 79
End: 2024-03-28

## 2024-03-28 VITALS
SYSTOLIC BLOOD PRESSURE: 98 MMHG | OXYGEN SATURATION: 98 % | DIASTOLIC BLOOD PRESSURE: 62 MMHG | HEART RATE: 62 BPM | RESPIRATION RATE: 18 BRPM

## 2024-03-28 VITALS
SYSTOLIC BLOOD PRESSURE: 89 MMHG | BODY MASS INDEX: 16.5 KG/M2 | HEART RATE: 69 BPM | HEIGHT: 65 IN | OXYGEN SATURATION: 96 % | RESPIRATION RATE: 16 BRPM | DIASTOLIC BLOOD PRESSURE: 60 MMHG | WEIGHT: 99 LBS | TEMPERATURE: 97 F

## 2024-03-28 DIAGNOSIS — C25.9 ADENOCARCINOMA OF PANCREAS (HCC): Primary | ICD-10-CM

## 2024-03-28 DIAGNOSIS — R53.1 GENERALIZED WEAKNESS: ICD-10-CM

## 2024-03-28 DIAGNOSIS — R11.0 NAUSEA: ICD-10-CM

## 2024-03-28 DIAGNOSIS — K44.9 HIATAL HERNIA: ICD-10-CM

## 2024-03-28 DIAGNOSIS — E86.1 HYPOVOLEMIA: ICD-10-CM

## 2024-03-28 DIAGNOSIS — R63.8 DECREASED ORAL INTAKE: ICD-10-CM

## 2024-03-28 DIAGNOSIS — E87.6 HYPOKALEMIA DUE TO INADEQUATE POTASSIUM INTAKE: ICD-10-CM

## 2024-03-28 LAB
ALBUMIN SERPL-MCNC: 3.9 G/DL (ref 3.2–4.8)
ALBUMIN/GLOB SERPL: 1.4 {RATIO} (ref 1–2)
ALP LIVER SERPL-CCNC: 115 U/L
ALT SERPL-CCNC: 10 U/L
ANION GAP SERPL CALC-SCNC: 10 MMOL/L (ref 0–18)
AST SERPL-CCNC: 23 U/L (ref ?–34)
BASOPHILS # BLD AUTO: 0.02 X10(3) UL (ref 0–0.2)
BASOPHILS NFR BLD AUTO: 0.4 %
BILIRUB SERPL-MCNC: 1.4 MG/DL (ref 0.2–1.1)
BUN BLD-MCNC: 11 MG/DL (ref 9–23)
BUN/CREAT SERPL: 12.6 (ref 10–20)
CALCIUM BLD-MCNC: 10.4 MG/DL (ref 8.7–10.4)
CHLORIDE SERPL-SCNC: 99 MMOL/L (ref 98–112)
CO2 SERPL-SCNC: 24 MMOL/L (ref 21–32)
CREAT BLD-MCNC: 0.87 MG/DL
DEPRECATED RDW RBC AUTO: 45.5 FL (ref 35.1–46.3)
EGFRCR SERPLBLD CKD-EPI 2021: 68 ML/MIN/1.73M2 (ref 60–?)
EOSINOPHIL # BLD AUTO: 0.04 X10(3) UL (ref 0–0.7)
EOSINOPHIL NFR BLD AUTO: 0.8 %
ERYTHROCYTE [DISTWIDTH] IN BLOOD BY AUTOMATED COUNT: 14.2 % (ref 11–15)
FASTING STATUS PATIENT QL REPORTED: NO
GLOBULIN PLAS-MCNC: 2.7 G/DL (ref 2.8–4.4)
GLUCOSE BLD-MCNC: 108 MG/DL (ref 70–99)
HCT VFR BLD AUTO: 38 %
HGB BLD-MCNC: 12.8 G/DL
IMM GRANULOCYTES # BLD AUTO: 0.04 X10(3) UL (ref 0–1)
IMM GRANULOCYTES NFR BLD: 0.8 %
LYMPHOCYTES # BLD AUTO: 0.55 X10(3) UL (ref 1–4)
LYMPHOCYTES NFR BLD AUTO: 10.9 %
MCH RBC QN AUTO: 30 PG (ref 26–34)
MCHC RBC AUTO-ENTMCNC: 33.7 G/DL (ref 31–37)
MCV RBC AUTO: 89.2 FL
MONOCYTES # BLD AUTO: 0.43 X10(3) UL (ref 0.1–1)
MONOCYTES NFR BLD AUTO: 8.5 %
NEUTROPHILS # BLD AUTO: 3.96 X10 (3) UL (ref 1.5–7.7)
NEUTROPHILS # BLD AUTO: 3.96 X10(3) UL (ref 1.5–7.7)
NEUTROPHILS NFR BLD AUTO: 78.6 %
OSMOLALITY SERPL CALC.SUM OF ELEC: 276 MOSM/KG (ref 275–295)
PLATELET # BLD AUTO: 129 10(3)UL (ref 150–450)
PLATELETS.RETICULATED NFR BLD AUTO: 3.2 % (ref 0–7)
POTASSIUM SERPL-SCNC: 3.8 MMOL/L (ref 3.5–5.1)
PROT SERPL-MCNC: 6.6 G/DL (ref 5.7–8.2)
RBC # BLD AUTO: 4.26 X10(6)UL
SODIUM SERPL-SCNC: 133 MMOL/L (ref 136–145)
WBC # BLD AUTO: 5 X10(3) UL (ref 4–11)

## 2024-03-28 PROCEDURE — 96360 HYDRATION IV INFUSION INIT: CPT

## 2024-03-28 PROCEDURE — 85025 COMPLETE CBC W/AUTO DIFF WBC: CPT

## 2024-03-28 PROCEDURE — 80053 COMPREHEN METABOLIC PANEL: CPT

## 2024-03-28 PROCEDURE — 96361 HYDRATE IV INFUSION ADD-ON: CPT

## 2024-03-28 PROCEDURE — 99214 OFFICE O/P EST MOD 30 MIN: CPT | Performed by: PHYSICIAN ASSISTANT

## 2024-03-28 RX ORDER — SODIUM CHLORIDE 9 MG/ML
10 INJECTION, SOLUTION INTRAMUSCULAR; INTRAVENOUS; SUBCUTANEOUS ONCE
Status: CANCELLED | OUTPATIENT
Start: 2024-03-28

## 2024-03-28 RX ORDER — SODIUM CHLORIDE 9 MG/ML
10 INJECTION, SOLUTION INTRAMUSCULAR; INTRAVENOUS; SUBCUTANEOUS ONCE
OUTPATIENT
Start: 2024-03-28

## 2024-03-28 RX ORDER — HEPARIN SODIUM (PORCINE) LOCK FLUSH IV SOLN 100 UNIT/ML 100 UNIT/ML
5 SOLUTION INTRAVENOUS ONCE
Status: COMPLETED | OUTPATIENT
Start: 2024-03-28 | End: 2024-03-28

## 2024-03-28 RX ORDER — LANSOPRAZOLE 30 MG/1
30 CAPSULE, DELAYED RELEASE ORAL 2 TIMES DAILY
Qty: 180 CAPSULE | Refills: 1 | Status: SHIPPED | OUTPATIENT
Start: 2024-03-28

## 2024-03-28 RX ORDER — HEPARIN SODIUM (PORCINE) LOCK FLUSH IV SOLN 100 UNIT/ML 100 UNIT/ML
SOLUTION INTRAVENOUS
Status: COMPLETED
Start: 2024-03-28 | End: 2024-03-28

## 2024-03-28 RX ORDER — HEPARIN SODIUM (PORCINE) LOCK FLUSH IV SOLN 100 UNIT/ML 100 UNIT/ML
5 SOLUTION INTRAVENOUS ONCE
OUTPATIENT
Start: 2024-03-28

## 2024-03-28 RX ADMIN — HEPARIN SODIUM (PORCINE) LOCK FLUSH IV SOLN 100 UNIT/ML 500 UNITS: 100 SOLUTION INTRAVENOUS at 13:20:00

## 2024-03-28 NOTE — TELEPHONE ENCOUNTER
Lucina calling not able to eat or drink for a week she has the metal taste in her mouth. She has taken sips of water and small bites of crackers. Denies nausea vomiting or diarrhea. T/y angi

## 2024-03-28 NOTE — PROGRESS NOTES
Pt here for IV hydration after and ACV due to weakness/dehydration. Pt tolerated infusion without difficulty or complaint. Reviewed next apt date/time - AVS given to patient. Patient instructed to call prior to next appointment if symptoms return and additional IVF are needed. Patient verbalized understanding.       Education Record  Learner:  Patient  Disease / Diagnosis: Pancreatic CA  Barriers / Limitations:  None  Method:  Discussion  General Topics:  Plan of care reviewed  Outcome:  Shows understanding

## 2024-03-28 NOTE — TELEPHONE ENCOUNTER
3/21/24 - C19D1 - Gemzar/Abraxane    Dehydration/ weakness/mouth bumps/white tongue/taste interfering with oral intake    Dehydration - Grade 2 - patient requesting hydration due to having poor oral intake for a week due to taste, bumps in mouth.  She is feeling weak.    Denies fevers or chills, has some sob the other day she contributed to her asthma, none currently.  Denies n/v/d.  States oral intake been difficult due to taste alteration despite doing interventions of good mouth care, lemon drops, etc.  Roof of mouth with some bumps and tongue with red in middle and outer area white in color, dry mouth.  Denies lightheadedness but feeling weak and tired.  No urinary or bowel complaints.    ACV at 1000 am with Leonard

## 2024-03-28 NOTE — PROGRESS NOTES
S:  78 year old female presents today, with her daughter, with complaint of generalized weakness due to reduced oral intake.  Patient states the anorexia is from dysgeusia.  She has tried protein shakes - but only takes a few sips due to dysgeusia.  This taste aversion started after radiation to her liver, which occurred from 2/16/24 - 2/22/24.  She does have dyspepsia.  She is on Prevacid 30 mg daily and has simethicone for increased gas.  Her mouth is sensitive on the left side.     She denies fever, cough, dyspnea, abdominal pain, nausea/vomiting, diarrhea, constipation, bone pain and peripheral edema.     O:  WDthin, cachectic, pale, NAD, w/c  HEENT - inflammation left buccal mucosa, no exudate, dry mucosa  Neck - supple non tender, no LAD  Lungs - non labored breathing, CTA  Cor - RRR  BLE - no edema    A:  1.  Stage IV pancreatic cancer  2.  Generalized weakness  3.  Decreased oral intake due to dysgeusia  4.  Dyspepsia, possibly contributing to dysgeusia    P:  1.  S/p cycle 19 day 1 Gemcitabine 600 mg/m2 and Abraxane 75 mg/m2 on 3/21/24  2.  CBC, CMP, 1L NS over 2 hours - clinically improved  3.  Nutritional tips:    -Increase spices/herbs in foods to accentuate flavor, if tolerable given mucositis  -Add citrus - lemon, lime - to food and drink to accentuate flavor  -Try other protein shakes besides Boost or Ensure.  Options include:  Premier, Core Power, [place] Instant Breakfast.  Protein albert:  Boost Breeze Ensure Clear, Premiere Clear and Body [place].    -There is also Boost pudding.  -Mirtazapine is a medication that can be prescribed that may increase your appetite.  It will not help with taste.  -Lastly, as discussed cannabis - edibles.    4.  Increase Prevacid to 30 mg BID  5.  Call prn  6.  Keep originally scheduled appointments

## 2024-04-06 ENCOUNTER — HOSPITAL ENCOUNTER (OUTPATIENT)
Dept: CT IMAGING | Facility: HOSPITAL | Age: 79
Discharge: HOME | End: 2024-04-06
Attending: STUDENT IN AN ORGANIZED HEALTH CARE EDUCATION/TRAINING PROGRAM
Payer: MEDICARE

## 2024-04-06 DIAGNOSIS — R53.1 GENERALIZED WEAKNESS: ICD-10-CM

## 2024-04-06 DIAGNOSIS — C25.9 ADENOCARCINOMA OF PANCREAS (HCC): ICD-10-CM

## 2024-04-06 DIAGNOSIS — Z51.11 CHEMOTHERAPY MANAGEMENT, ENCOUNTER FOR: ICD-10-CM

## 2024-04-06 PROCEDURE — 71260 CT THORAX DX C+: CPT | Performed by: STUDENT IN AN ORGANIZED HEALTH CARE EDUCATION/TRAINING PROGRAM

## 2024-04-06 PROCEDURE — 74177 CT ABD & PELVIS W/CONTRAST: CPT | Performed by: STUDENT IN AN ORGANIZED HEALTH CARE EDUCATION/TRAINING PROGRAM

## 2024-04-10 ENCOUNTER — APPOINTMENT (OUTPATIENT)
Dept: CT IMAGING | Facility: HOSPITAL | Age: 79
End: 2024-04-10
Attending: EMERGENCY MEDICINE
Payer: MEDICARE

## 2024-04-10 ENCOUNTER — APPOINTMENT (OUTPATIENT)
Dept: GENERAL RADIOLOGY | Facility: HOSPITAL | Age: 79
End: 2024-04-10
Attending: EMERGENCY MEDICINE
Payer: MEDICARE

## 2024-04-10 ENCOUNTER — HOSPITAL ENCOUNTER (EMERGENCY)
Facility: HOSPITAL | Age: 79
Discharge: HOME | End: 2024-04-10
Attending: EMERGENCY MEDICINE
Payer: MEDICARE

## 2024-04-10 ENCOUNTER — TELEPHONE (OUTPATIENT)
Dept: HEMATOLOGY/ONCOLOGY | Facility: HOSPITAL | Age: 79
End: 2024-04-10

## 2024-04-10 VITALS
RESPIRATION RATE: 19 BRPM | SYSTOLIC BLOOD PRESSURE: 148 MMHG | DIASTOLIC BLOOD PRESSURE: 82 MMHG | HEART RATE: 67 BPM | OXYGEN SATURATION: 100 % | WEIGHT: 99 LBS | TEMPERATURE: 99 F | BODY MASS INDEX: 16 KG/M2

## 2024-04-10 DIAGNOSIS — R10.84 ABDOMINAL PAIN, GENERALIZED: Primary | ICD-10-CM

## 2024-04-10 DIAGNOSIS — C78.7 PANCREATIC CANCER METASTASIZED TO LIVER (HCC): ICD-10-CM

## 2024-04-10 DIAGNOSIS — C25.9 PANCREATIC CANCER METASTASIZED TO LIVER (HCC): ICD-10-CM

## 2024-04-10 LAB
ALBUMIN SERPL-MCNC: 3.5 G/DL (ref 3.2–4.8)
ALP LIVER SERPL-CCNC: 173 U/L
ALT SERPL-CCNC: 20 U/L
ANION GAP SERPL CALC-SCNC: 5 MMOL/L (ref 0–18)
AST SERPL-CCNC: 38 U/L (ref ?–34)
ATRIAL RATE: 79 BPM
BASOPHILS # BLD AUTO: 0.02 X10(3) UL (ref 0–0.2)
BASOPHILS NFR BLD AUTO: 0.5 %
BILIRUB DIRECT SERPL-MCNC: 0.5 MG/DL (ref ?–0.3)
BILIRUB SERPL-MCNC: 1.1 MG/DL (ref 0.2–1.1)
BILIRUB UR QL: NEGATIVE
BUN BLD-MCNC: 10 MG/DL (ref 9–23)
BUN/CREAT SERPL: 14.3 (ref 10–20)
CALCIUM BLD-MCNC: 10.1 MG/DL (ref 8.7–10.4)
CHLORIDE SERPL-SCNC: 101 MMOL/L (ref 98–112)
CLARITY UR: CLEAR
CO2 SERPL-SCNC: 29 MMOL/L (ref 21–32)
COLOR UR: YELLOW
CREAT BLD-MCNC: 0.7 MG/DL
DEPRECATED RDW RBC AUTO: 51.3 FL (ref 35.1–46.3)
EGFRCR SERPLBLD CKD-EPI 2021: 88 ML/MIN/1.73M2 (ref 60–?)
EOSINOPHIL # BLD AUTO: 0 X10(3) UL (ref 0–0.7)
EOSINOPHIL NFR BLD AUTO: 0 %
ERYTHROCYTE [DISTWIDTH] IN BLOOD BY AUTOMATED COUNT: 15.8 % (ref 11–15)
GLUCOSE BLD-MCNC: 104 MG/DL (ref 70–99)
GLUCOSE UR-MCNC: NORMAL MG/DL
HCT VFR BLD AUTO: 34.5 %
HGB BLD-MCNC: 12.3 G/DL
HGB UR QL STRIP.AUTO: NEGATIVE
IMM GRANULOCYTES # BLD AUTO: 0.03 X10(3) UL (ref 0–1)
IMM GRANULOCYTES NFR BLD: 0.7 %
KETONES UR-MCNC: NEGATIVE MG/DL
LEUKOCYTE ESTERASE UR QL STRIP.AUTO: 250
LIPASE SERPL-CCNC: 50 U/L (ref 13–75)
LYMPHOCYTES # BLD AUTO: 0.52 X10(3) UL (ref 1–4)
LYMPHOCYTES NFR BLD AUTO: 12.7 %
MCH RBC QN AUTO: 32.2 PG (ref 26–34)
MCHC RBC AUTO-ENTMCNC: 35.7 G/DL (ref 31–37)
MCV RBC AUTO: 90.3 FL
MONOCYTES # BLD AUTO: 0.53 X10(3) UL (ref 0.1–1)
MONOCYTES NFR BLD AUTO: 12.9 %
NEUTROPHILS # BLD AUTO: 3.01 X10 (3) UL (ref 1.5–7.7)
NEUTROPHILS # BLD AUTO: 3.01 X10(3) UL (ref 1.5–7.7)
NEUTROPHILS NFR BLD AUTO: 73.2 %
NITRITE UR QL STRIP.AUTO: NEGATIVE
OSMOLALITY SERPL CALC.SUM OF ELEC: 279 MOSM/KG (ref 275–295)
P AXIS: 56 DEGREES
P-R INTERVAL: 136 MS
PH UR: 6.5 [PH] (ref 5–8)
PLATELET # BLD AUTO: 157 10(3)UL (ref 150–450)
POTASSIUM SERPL-SCNC: 4 MMOL/L (ref 3.5–5.1)
PROT SERPL-MCNC: 6.2 G/DL (ref 5.7–8.2)
PROT UR-MCNC: 20 MG/DL
Q-T INTERVAL: 400 MS
QRS DURATION: 92 MS
QTC CALCULATION (BEZET): 458 MS
R AXIS: 54 DEGREES
RBC # BLD AUTO: 3.82 X10(6)UL
SODIUM SERPL-SCNC: 135 MMOL/L (ref 136–145)
SP GR UR STRIP: 1.02 (ref 1–1.03)
T AXIS: 55 DEGREES
TROPONIN I SERPL HS-MCNC: 7 NG/L
UROBILINOGEN UR STRIP-ACNC: 8
VENTRICULAR RATE: 79 BPM
WBC # BLD AUTO: 4.1 X10(3) UL (ref 4–11)

## 2024-04-10 PROCEDURE — 93005 ELECTROCARDIOGRAM TRACING: CPT

## 2024-04-10 PROCEDURE — 80048 BASIC METABOLIC PNL TOTAL CA: CPT | Performed by: EMERGENCY MEDICINE

## 2024-04-10 PROCEDURE — 71260 CT THORAX DX C+: CPT | Performed by: EMERGENCY MEDICINE

## 2024-04-10 PROCEDURE — 83690 ASSAY OF LIPASE: CPT | Performed by: EMERGENCY MEDICINE

## 2024-04-10 PROCEDURE — 85025 COMPLETE CBC W/AUTO DIFF WBC: CPT | Performed by: EMERGENCY MEDICINE

## 2024-04-10 PROCEDURE — 80076 HEPATIC FUNCTION PANEL: CPT | Performed by: EMERGENCY MEDICINE

## 2024-04-10 PROCEDURE — 96360 HYDRATION IV INFUSION INIT: CPT

## 2024-04-10 PROCEDURE — 93010 ELECTROCARDIOGRAM REPORT: CPT

## 2024-04-10 PROCEDURE — 99285 EMERGENCY DEPT VISIT HI MDM: CPT

## 2024-04-10 PROCEDURE — 84484 ASSAY OF TROPONIN QUANT: CPT | Performed by: EMERGENCY MEDICINE

## 2024-04-10 PROCEDURE — 81001 URINALYSIS AUTO W/SCOPE: CPT | Performed by: EMERGENCY MEDICINE

## 2024-04-10 PROCEDURE — 74177 CT ABD & PELVIS W/CONTRAST: CPT | Performed by: EMERGENCY MEDICINE

## 2024-04-10 PROCEDURE — 71045 X-RAY EXAM CHEST 1 VIEW: CPT | Performed by: EMERGENCY MEDICINE

## 2024-04-10 PROCEDURE — 96361 HYDRATE IV INFUSION ADD-ON: CPT

## 2024-04-10 RX ORDER — TRAMADOL HYDROCHLORIDE 50 MG/1
TABLET ORAL EVERY 6 HOURS PRN
Qty: 30 TABLET | Refills: 0 | Status: SHIPPED | OUTPATIENT
Start: 2024-04-10 | End: 2024-04-12

## 2024-04-10 NOTE — TELEPHONE ENCOUNTER
Toxicities: Albumin-bound Paclitaxel/Gemcitabine on 3/2/2024    Chest Pain/Dyspnea/Abdominal Pain/Nausea/Vomiting/Constipation    Chest Pain: (Patient's son reports his mother told him this morning she has been having chest pain and dyspnea on and off for the last two days. She is not diaphoretic. The last time she felt it was last night.)  Abdominal Pain/Nausea/Vomiting/Constipation: (Patient's son reports she started having sharp, stabbing abdominal pain last night that was \"10/10.\" She has not moved her bowels in 2-3 days. She also is nauseated and vomited once this morning. She has only be able to drink a little water this morning with her tramadol that she took at 6 am this morning. She is now reports her abdominal pain is \"5/10.\")    I explained to Shawn and Lucina that she will need to be evaluated in the ER for chest pain and possible bowel obstruction. I recommended they call an ambulance. Lucina said she can come by car. I called report to the [facility] ER.

## 2024-04-10 NOTE — TELEPHONE ENCOUNTER
Per Shawn his Mother has been up all night and she is having with stomach pains, pain is a 10/10 and she took Tramadol and the pain is at a 5/10 now. Nausea, vomiting during the night, ribs are sore. Dr. Dawkins pt.

## 2024-04-10 NOTE — ED PROVIDER NOTES
Patient Seen in: [facility] Emergency Department      History     Chief Complaint   Patient presents with    Pain     Stated Complaint: Chest Pain; Abdominal Pain; N/V    Subjective:   HPI    Patient presents emergency department complaining of chest and abdominal pain with some nausea.  She has a history of metastatic pancreatic cancer and is getting chemotherapy.  She has an appointment to see her oncologist tomorrow to discuss the most recent CT scan that was done a few days ago.  She states that last night she had significant pain requiring tramadol for pain control.    Objective:   Past Medical History:    Asthma (HCC)    Blepharitis    OU    Cataract    OU    Choroidal nevus    OS    Corneal foreign body    OD, removed in office    Dry eyes    OU, Schirmers 15mm/13mm    GERD (gastroesophageal reflux disease)    Headache    Hiatal hernia    High cholesterol    Marginal ulcer    Staph marginal ulcer @ 2 oclock, OS, Dx'd 8-18-08    Migraines    Tinnitus    Vertigo              Past Surgical History:   Procedure Laterality Date    Appendectomy      Colonoscopy      Foot surgery      bunionectomy    Hernia surgery      hernia repair    Hip replacement surgery  2010    Inj tendon/ligament/cyst      Injection Tendon Sheath, Ligament, SOCRATES A FRANSISCA    Tonsillectomy                  Social History     Socioeconomic History    Marital status:    Tobacco Use    Smoking status: Former    Smokeless tobacco: Never   Substance and Sexual Activity    Alcohol use: Not Currently     Comment: occasionally    Drug use: Never   Other Topics Concern    Caffeine Concern Yes     Comment: coffee, tea, 4 cups daily              Review of Systems    Positive for stated complaint: Chest Pain; Abdominal Pain; N/V  Other systems are as noted in HPI.  Constitutional and vital signs reviewed.      All other systems reviewed and negative except as noted above.    Physical Exam     ED Triage Vitals   BP 04/10/24 1107 (!) 87/46    Pulse 04/10/24 1107 82   Resp 04/10/24 1107 18   Temp 04/10/24 1107 99 °F (37.2 °C)   Temp src 04/10/24 1107 Temporal   SpO2 04/10/24 1107 100 %   O2 Device 04/10/24 1415 None (Room air)       Current:/82   Pulse 67   Temp 99 °F (37.2 °C) (Temporal)   Resp 19   Wt 44.9 kg   SpO2 100%   BMI 16.47 kg/m²         Physical Exam  Vitals and nursing note reviewed.   Constitutional:       General: She is not in acute distress.     Appearance: She is well-developed.      Comments: Patient appears malnourished and cachectic.   HENT:      Head: Normocephalic.      Nose: Nose normal.      Mouth/Throat:      Mouth: Mucous membranes are moist.   Eyes:      Conjunctiva/sclera: Conjunctivae normal.   Cardiovascular:      Rate and Rhythm: Normal rate and regular rhythm.      Heart sounds: No murmur heard.  Pulmonary:      Effort: Pulmonary effort is normal. No respiratory distress.      Breath sounds: Normal breath sounds.   Abdominal:      General: There is no distension.      Palpations: Abdomen is soft.      Tenderness: There is abdominal tenderness. There is no guarding or rebound.   Musculoskeletal:         General: No tenderness. Normal range of motion.      Cervical back: Normal range of motion and neck supple.   Skin:     General: Skin is warm and dry.      Capillary Refill: Capillary refill takes less than 2 seconds.      Findings: No rash.   Neurological:      General: No focal deficit present.      Mental Status: She is alert and oriented to person, place, and time.      Cranial Nerves: No cranial nerve deficit.      Sensory: No sensory deficit.      Motor: No weakness.         It was been costly for little bit    ED Course     Labs Reviewed   BASIC METABOLIC PANEL (8) - Abnormal; Notable for the following components:       Result Value    Glucose 104 (*)     Sodium 135 (*)     All other components within normal limits   HEPATIC FUNCTION PANEL (7) - Abnormal; Notable for the following components:    AST 38  (*)     Alkaline Phosphatase 173 (*)     Bilirubin, Direct 0.5 (*)     All other components within normal limits   URINALYSIS, ROUTINE - Abnormal; Notable for the following components:    Protein Urine 20 (*)     Urobilinogen Urine 8 (*)     Leukocyte Esterase Urine 250 (*)     WBC Urine 6-10 (*)     Bacteria Urine Rare (*)     Squamous Epi. Cells Few (*)     Ca Oxalate Crystals Few (*)     All other components within normal limits   CBC W/ DIFFERENTIAL - Abnormal; Notable for the following components:    HCT 34.5 (*)     RDW-SD 51.3 (*)     RDW 15.8 (*)     Lymphocyte Absolute 0.52 (*)     All other components within normal limits   LIPASE - Normal   TROPONIN I HIGH SENSITIVITY - Normal   CBC WITH DIFFERENTIAL WITH PLATELET    Narrative:     The following orders were created for panel order CBC With Differential With Platelet.  Procedure                               Abnormality         Status                     ---------                               -----------         ------                     CBC W/ DIFFERENTIAL[[id number]]          Abnormal            Final result                 Please view results for these tests on the individual orders.     EKG    Rate, intervals and axes as noted on EKG Report.  Rate: 79 bpm  Rhythm: Sinus Rhythm  Reading:  normal                          MDM      Pulse Ox: 100%, Normal,     Cardiac Monitor:   Pulse Readings from Last 1 Encounters:   04/10/24 67   , sinus,      Radiology findings: CT CHEST PE AORTA (IV ONLY) (CPT=71260)    Addendum Date: 4/10/2024    CORRECTION Corrected on: 4/10/2024;   PROCEDURE: CT CHEST PE AORTA (IV ONLY) (CPT=71260)  COMPARISON: [facility], CT CHEST+ABDOMEN+PELVIS(ALL CNTRST ONLY)(CPT=71260/08887), 4/06/2024, 10:06 AM.  [facility], CT CHEST+ABDOMEN+PELVIS(ALL CNTRST ONLY)(CPT=71260/95644), 1/06/2024, 11:10 AM.  [facility], CT CHEST+ABDOMEN+PELVIS(ALL CNTRST ONLY)(CPT=71260/23031),  11/04/2023, 10:40 AM.  INDICATIONS: CP, SOB, pancreatic CA  TECHNIQUE: CT images of the chest were obtained with non-ionic intravenous contrast material.  Automated exposure control for dose reduction was used. Adjustment of the mA and/or kV was done based on the patient's size. Use of iterative reconstruction technique for dose reduction was used. Dose information is transmitted to the ACR (American College of Radiology) NRDR (National Radiology Data Registry) which includes the Dose Index Registry.  FINDINGS:  PULMONARY ARTERIES:   No pulmonary embolus   LINES AND TUBES: There is a right sided port catheter with tip at the mid SVC..  MEDIASTINUM/VASCULATURE: There are multiple mildly prominent mediastinal lymph nodes which are nonspecific and measure less than 1 cm in short axis. There is atherosclerotic calcification of the aortic arch and thoracic aorta. The thoracic aorta is otherwise unremarkable and not dilated. Mediastinal fat planes are preserved. Cardiac chambers are unremarkable. Pericardium is normal. There are coronary artery calcifications. The main pulmonary artery has a normal diameter and is otherwise unremarkable.  LUNGS AND PLEURA: 0.9 x 1.1 cm spiculated nodule within the anterior right middle lobe has increased in size since the prior exams.  1.6 x 1.2 cm nodule within the medial left lower lobe is new.  1.1 cm nodule within the anterior left upper lobe is unchanged.  No pneumothorax or dense consolidation.  Trace right pleural effusion.  Mild interlobular septal thickening seen within the right lower lobe.  1 cm spiculated nodule within the right lower lobe has increased in size.  0.8 cm spiculated nodule  within the left lower lobe has also increased in size..  There is biapical scarring and pleural thickening.   CHEST WALL/BONES: There is degenerative disease of the thoracic spine. The chest wall and osseous structures are otherwise unremarkable.  Sclerosis and erosions seen involving the L1  vertebral body is unchanged.  LIMITED ABDOMEN: Dictated in a separate study.  Moderate size hiatal hernia is partially seen and better characterized on concurrently performed CT abdomen pelvis.  CONCLUSION:   No pulmonary embolus.  Multiple bilateral spiculated nodules have increased in size since the prior exams and suspicious for pulmonary metastases.  Interlobular septal thickening within the right lower lobe may be secondary to pulmonary edema or lymphangitic carcinomatosis.  L1 osseous metastasis is unchanged.      Dictated by (CST): Jasson Luna MD on 4/10/2024 at 1:33 PM     Finalized by (CST): Jasson Luna MD on 4/10/2024 at 1:40 PM    Dictated by (CST): Jasson Luna MD on 4/10/2024 at 1:55 PM     Finalized by (CST): Jasson Luna MD on 4/10/2024 at 1:55 PM              Result Date: 4/10/2024  CONCLUSION:   No pulmonary embolus.  Multiple bilateral spiculated nodules have increased in size since the prior exams and suspicious for pulmonary metastases.  Interlobular septal thickening within the right lower lobe may be secondary to pulmonary edema or lymphangitic carcinomatosis.  Osseous metastasis of T12.      Dictated by (CST): Jasson Luna MD on 4/10/2024 at 1:33 PM     Finalized by (CST): Jasson Luna MD on 4/10/2024 at 1:40 PM          CT ABDOMEN+PELVIS(CONTRAST ONLY)(CPT=74177)    Result Date: 4/10/2024  CONCLUSION:   Pancreatic head and pancreatic tail masses have not significantly changed since 4/6/2024 but the pancreatic head mass is significantly increased in size since 1/6/2024.  Hepatic metastases have increased in size since 1/6/2024.  Previously visualized rectosigmoid mass is not well seen on current exam.  Large amount of excessive stool seen throughout the colon suggestive of constipation. No obstruction.  L1 metastasis is unchanged.  1.1 cm enhancing mass within the left interpolar kidney is unchanged.  Continued follow-up surveillance imaging suggested.  Multiple other incidental findings as  described in the body of the report which are unchanged.    Dictated by (CST): Jasson Luna MD on 4/10/2024 at 1:47 PM     Finalized by (CST): Jasson Luna MD on 4/10/2024 at 1:54 PM           XR CHEST AP PORTABLE  (CPT=71045)    Result Date: 4/10/2024  PROCEDURE: XR CHEST AP PORTABLE  (CPT=71045) TIME: 1126  COMPARISON: None.  INDICATIONS: Generalized weakness with nausea and vomting for a few days.  TECHNIQUE:   Single view.   Findings and impression:  Normal heart size, clear lungs, normal pleura.  Gosia catheter in the mid SVC.  No free air    Dictated by (CST): Shawn Choudhary MD on 4/10/2024 at 11:48 AM     Finalized by (CST): Shawn Choudhary MD on 4/10/2024 at 11:48 AM           Radiology exams  Viewed and reviewed by myself and findings discussed with patient including need for follow up                                     Medical Decision Making  Differential diagnosis considered for PE, bowel obstruction, progression of malignancy    Problems Addressed:  Abdominal pain, generalized: acute illness or injury  Pancreatic cancer metastasized to liver (HCC): chronic illness or injury    Amount and/or Complexity of Data Reviewed  Labs: ordered. Decision-making details documented in ED Course.     Details: Labs reviewed, no acute abnormalities  Radiology: ordered and independent interpretation performed. Decision-making details documented in ED Course.     Details: No PE, likely metastatic disease.  Progressive CT T findings of pancreatic cancer with metastases.  ECG/medicine tests: ordered and independent interpretation performed. Decision-making details documented in ED Course.  Discussion of management or test interpretation with external provider(s): Discussed results with patient and family.  Based on significant advancement of malignancy  since January CT, anticipate likely poor prognosis.  Patient is comfortable with discharge home with tramadol and follow-up tomorrow to discuss further treatment plans and  possibly hospice.  Discussed with Dr. Corado covering for Dr. Coronel.    Risk  Prescription drug management.        Disposition and Plan     Clinical Impression:  1. Abdominal pain, generalized    2. Pancreatic cancer metastasized to liver (HCC)         Disposition:  Discharge  4/10/2024  2:55 pm    Follow-up:  Bartolo Dawkins, DO  [address] [facility] [address]  [phone]    Follow up in 1 day(s)  as planned    We recommend that you schedule follow up care with a primary care provider within the next three months to obtain basic health screening including reassessment of your blood pressure.      Medications Prescribed:  Discharge Medication List as of 4/10/2024  3:19 PM        START taking these medications    Details   !! traMADol 50 MG Oral Tab Take 1-2 tablets ( mg total) by mouth every 6 (six) hours as needed for Pain., Normal, Disp-30 tablet, R-0       !! - Potential duplicate medications found. Please discuss with provider.

## 2024-04-10 NOTE — TELEPHONE ENCOUNTER
Writer called patient as he spoke with PSR Sonam requesting to speak to  tomorrow. Patient's son clarified that this regarding whether a  will be available during tomorrow's visit to discuss hospice. Shawn was informed that goals of care and hospice are to be discussed with provider and if choice is to pursue hospice, then  will assist in setting up services. Patient had labs drawn in ER this afternoon, therefore lab appointment tomorrow was cancelled. Patient for 2pm appointment with Leonard GARCIA and 3pm infusion tomorrow if applicable.

## 2024-04-10 NOTE — ED INITIAL ASSESSMENT (HPI)
Pt to ED with family with concerns for increasing upper abdominal pain that radiates to chest. Pt states intermittent nausea and dyspnea. Pt states currently being treated with chemotherapy for adenocarcinoma of pancreas. Pt is alert and oriented x4. Pt states last ultram 0600 today.

## 2024-04-11 ENCOUNTER — SOCIAL WORK SERVICES (OUTPATIENT)
Dept: HEMATOLOGY/ONCOLOGY | Facility: HOSPITAL | Age: 79
End: 2024-04-11

## 2024-04-11 ENCOUNTER — OFFICE VISIT (OUTPATIENT)
Dept: HEMATOLOGY/ONCOLOGY | Facility: HOSPITAL | Age: 79
End: 2024-04-11
Attending: STUDENT IN AN ORGANIZED HEALTH CARE EDUCATION/TRAINING PROGRAM
Payer: MEDICARE

## 2024-04-11 VITALS
OXYGEN SATURATION: 97 % | HEART RATE: 93 BPM | BODY MASS INDEX: 16.83 KG/M2 | RESPIRATION RATE: 18 BRPM | TEMPERATURE: 98 F | SYSTOLIC BLOOD PRESSURE: 126 MMHG | DIASTOLIC BLOOD PRESSURE: 70 MMHG | HEIGHT: 65 IN | WEIGHT: 101 LBS

## 2024-04-11 DIAGNOSIS — E86.1 HYPOVOLEMIA: ICD-10-CM

## 2024-04-11 DIAGNOSIS — C25.9 PANCREATIC CANCER METASTASIZED TO LIVER (HCC): ICD-10-CM

## 2024-04-11 DIAGNOSIS — R53.1 GENERALIZED WEAKNESS: ICD-10-CM

## 2024-04-11 DIAGNOSIS — E87.6 HYPOKALEMIA DUE TO INADEQUATE POTASSIUM INTAKE: ICD-10-CM

## 2024-04-11 DIAGNOSIS — R11.0 NAUSEA: ICD-10-CM

## 2024-04-11 DIAGNOSIS — Z51.11 CHEMOTHERAPY MANAGEMENT, ENCOUNTER FOR: ICD-10-CM

## 2024-04-11 DIAGNOSIS — C25.9 ADENOCARCINOMA OF PANCREAS (HCC): Primary | ICD-10-CM

## 2024-04-11 DIAGNOSIS — R10.84 ABDOMINAL PAIN, GENERALIZED: ICD-10-CM

## 2024-04-11 DIAGNOSIS — C78.7 PANCREATIC CANCER METASTASIZED TO LIVER (HCC): ICD-10-CM

## 2024-04-11 PROCEDURE — 96360 HYDRATION IV INFUSION INIT: CPT

## 2024-04-11 PROCEDURE — 96361 HYDRATE IV INFUSION ADD-ON: CPT

## 2024-04-11 PROCEDURE — 99215 OFFICE O/P EST HI 40 MIN: CPT | Performed by: PHYSICIAN ASSISTANT

## 2024-04-11 RX ORDER — HEPARIN SODIUM (PORCINE) LOCK FLUSH IV SOLN 100 UNIT/ML 100 UNIT/ML
5 SOLUTION INTRAVENOUS ONCE
OUTPATIENT
Start: 2024-04-11

## 2024-04-11 RX ORDER — SODIUM CHLORIDE 9 MG/ML
10 INJECTION, SOLUTION INTRAMUSCULAR; INTRAVENOUS; SUBCUTANEOUS ONCE
OUTPATIENT
Start: 2024-04-11

## 2024-04-11 RX ORDER — LEVALBUTEROL TARTRATE 45 UG/1
1-2 AEROSOL, METERED ORAL EVERY 4 HOURS PRN
Qty: 15 G | Refills: 1 | Status: SHIPPED | OUTPATIENT
Start: 2024-04-11

## 2024-04-11 RX ORDER — HEPARIN SODIUM (PORCINE) LOCK FLUSH IV SOLN 100 UNIT/ML 100 UNIT/ML
5 SOLUTION INTRAVENOUS ONCE
Status: COMPLETED | OUTPATIENT
Start: 2024-04-11 | End: 2024-04-11

## 2024-04-11 RX ORDER — HEPARIN SODIUM (PORCINE) LOCK FLUSH IV SOLN 100 UNIT/ML 100 UNIT/ML
SOLUTION INTRAVENOUS
Status: COMPLETED
Start: 2024-04-11 | End: 2024-04-11

## 2024-04-11 RX ADMIN — HEPARIN SODIUM (PORCINE) LOCK FLUSH IV SOLN 100 UNIT/ML 500 UNITS: 100 SOLUTION INTRAVENOUS at 17:25:00

## 2024-04-11 NOTE — PROGRESS NOTES
Pt here for 1 Liter saline bolus   She is not being treated today with chemo as she has a palliative care consult.  ARMANI FRENCHW at her chairside during infusion.     Ordering MD: POORNIMA GARCIA       Pt tolerated infusion without difficulty or complaint.      Education Record  Learner:  Patient  Disease / Diagnosis: dehydration, pancreatic CA  Barriers / Limitations:  None  Method:  Brief focused and Discussion  General Topics:  Plan of care reviewed  Outcome:  Shows understanding

## 2024-04-11 NOTE — PROGRESS NOTES
SW met with patient and patient's son Shawn in infusion 17 today to discuss palliative care. Patient is no longer receiving treatment but would like to continue to have IV-hydration as needed. SW gave a brief explanations of community palliative care as well has community hospice. SW explained that similarities and the differences between the two. Patient and her spouse expressed understanding. Patient and her son shawn expressed interest in having a informative meeting with palliative care to discuss goals of care before making a decision on how they would like to proceed. Patient is currently residing with her son Shawn [place] [address]. Shawn provided his contact information so that palliative care can contact him to schedule a meeting. [facility] faxed referral to [facility] at [phone]. SW awaiting response from [facility] at this time.

## 2024-04-11 NOTE — PATIENT INSTRUCTIONS
IV fluids today  Palliative transition to hospice referral  Start Polyethylene glycol take up to twice daily  Xopenex inhaler - instead of Albuterol inhaler  Call as needed  Tentatively follow-up next week

## 2024-04-12 ENCOUNTER — PATIENT OUTREACH (OUTPATIENT)
Dept: CASE MANAGEMENT | Age: 79
End: 2024-04-12

## 2024-04-12 ENCOUNTER — SOCIAL WORK SERVICES (OUTPATIENT)
Dept: HEMATOLOGY/ONCOLOGY | Facility: HOSPITAL | Age: 79
End: 2024-04-12

## 2024-04-12 DIAGNOSIS — C78.7 PANCREATIC CANCER METASTASIZED TO LIVER (HCC): Primary | ICD-10-CM

## 2024-04-12 DIAGNOSIS — C25.9 PANCREATIC CANCER METASTASIZED TO LIVER (HCC): Primary | ICD-10-CM

## 2024-04-12 PROBLEM — R10.84 ABDOMINAL PAIN, GENERALIZED: Status: ACTIVE | Noted: 2024-01-01

## 2024-04-12 PROBLEM — R10.84 ABDOMINAL PAIN, GENERALIZED: Status: ACTIVE | Noted: 2024-04-12

## 2024-04-12 RX ORDER — TRAMADOL HYDROCHLORIDE 50 MG/1
TABLET ORAL EVERY 6 HOURS PRN
Qty: 120 TABLET | Refills: 0 | Status: SHIPPED | OUTPATIENT
Start: 2024-04-12

## 2024-04-12 NOTE — PROGRESS NOTES
CHASE received a response from Lakeisha with [facility] regarding the referral status. Lakeisha informed writer that referral was received and they are scheduled to meet at the [facility] for informative meeting today at 10 am.

## 2024-04-12 NOTE — PROGRESS NOTES
Rajwinder KINGSTON [facility]  Oncology Progress Note    Patient Name: Lucina Manzanares   YOB: 1945   Medical Record Number: [id number]    Chief Complaint: metastatic pancreatic cancer    Cancer Staging  Adenocarcinoma of pancreas (HCC)  Staging form: Exocrine Pancreas, AJCC 8th Edition  - Clinical stage from 1/25/2023: Stage IV (cTX, cNX, pM1) - Signed by Bartolo Dawkins DO on 1/30/2023    Oncology History Overview Note   Diagnosis: Metastatic MSI-S adenocarcinoma of the pancreas  Date of Dx: 1/25/23  Molecular: MSI-S  -somatic KRAS p.G12R, TP53, SMAD4  -germline TERT heterozygous VUS  Treatments:  -Gemcitabine, Abraxane    1/17/23: Initial medical oncology appointment. Briefly, the patient developed progressive left-sided abdominal pain in late November and was referred to gastroenterology (Dr. Hamm) who pursued imaging with a CT a/p noncontrast which revealed multiple hypoattenuating hepatic lesions, a solid and cystic pancreatic tail mass extending to the splenic hilum and splenomegaly with splenic vein occlusion.  Referred to medical oncology for further care. Discussed concern for pancreatic cancer, plan for CT c/a/p with contrast and IR biopsy.     1/21/23: CT c/a/p pancreatic tail mass with splenic vein occlusion, multiple liver metastases, bilateral lung nodules concerning for metastases and left upper quadrant omental nodularity suspicious for early carcinomatosis.     1/25/23: IR liver biopsy, PATH: MSI-S moderately differentiated adenocarcinoma of the pancreas. The tumor cells are positive for keratin AE1/AE3, CK7, CK20, , and CDX2, but are negative for GATA3 and TTF-1.    1/30/23: med onc followup. Discussed metastatic nature of disease, prognosis, palliative intent of therapy. Pt fit for gemcitabine/abraxane. Will start with dose-reduction given goals of care.   -2/6, port placement    2/9/23: C1D1 [place]/Abraxane  -2/16, tolerating well, cleared for D8, RTC 2 weeks. Treated for  strep pharyngitis.   3/23/23: tolerating well, cleared for C#3.     5/4/23: restaging CT shows partial response to treatment. Pt feeling weaker from chemo, continues to lose weight. Hold tx 1 week, reassess.     5/11/23: pt feeling stronger, gained 2-3 lbs. Will restart dose-reduced gem/abraxane, plan for chemo q2 weeks.     6/15/23: pt opted for q3 week dosing of [place]/Abraxane. Does very well during week 3. No pain, labs and PS adequate to continue therapy, continue q3 week dosing.     7/27/23: restaging CT c/a/p shows continued treatment response. Continue dose-reduced [place]/Abraxane every 3 weeks.     11/4/23: slightly growth to 1 hepatic lesion, continue chemotherapy, restage in 2 months.     1/11/24: restaging CT shows enlarging hepatic metastasis, but other areas of disease are stable. We will continue chemotherapy and plan for radiation to the hepatic metastasis.      Adenocarcinoma of pancreas (HCC)   1/25/2023 Cancer Staged    Staging form: Exocrine Pancreas, AJCC 8th Edition  - Clinical stage from 1/25/2023: Stage IV (cTX, cNX, pM1)     1/30/2023 Initial Diagnosis    Adenocarcinoma of pancreas (HCC)     2/9/2023 -  Chemotherapy    OP Albumin-bound PACLitaxel / Gemcitabine  Plan Provider: Bartolo Dawkins DO  Treatment goal: Palliative  Line of treatment: [No plan line of treatment]       Subjective:   Lucina Manzanares is a 78 year old female with a hx of HTN, asthma, and fibromyalgia who presents to medical oncology regarding metastatic MSI-S adenocarcinoma of the pancreas on frontline gemcitabine Abraxane as detailed above.  She has developed compiling toxicities mainly fatigue poor appetite and dysgeusia and so she is receiving dose reduced gemcitabine Abraxane every 3 weeks.    Patient presents for follow-up and consideration of her next cycle of chemotherapy.  Patient presented to the ER yesterday with complaints of dyspnea, chest and abdominal pain.  Her son is here with her.  She has moved in with her  son and his partner since their home is one floor, without stairs.  Lucina has excellent social support.  Family may get frustrated that she is not eating as much as they want, however, she is doing quite well.  Fluid intake is ok.  Her breathing is mostly labored with activity (stairs), but, now, even can be conversational.  Per her son, she appears better today than yesterday at the ER.  Due to her overall presentation and poor QOL, the patient is not interested in pursuing further chemotherapy and would like to know the next steps.     She does have albuterol that she has not tried.  She is using Tramadol with benefit for pain control.  She is on Miralax to control constipation.      Review of Systems:  Hematology/Oncology ROS performed and negative except as above in HPI    History/Other:   Current Medications:   Levalbuterol Tartrate 45 MCG/ACT Inhalation Aerosol Inhale 1-2 puffs into the lungs every 4 (four) hours as needed for Wheezing. 15 g 1    traMADol 50 MG Oral Tab Take 1-2 tablets ( mg total) by mouth every 6 (six) hours as needed for Pain. 30 tablet 0    lansoprazole 30 MG Oral Capsule Delayed Release Take 1 capsule (30 mg total) by mouth in the morning and 1 capsule (30 mg total) before bedtime. 180 capsule 1    cyclobenzaprine 5 MG Oral Tab Take 1 tablet (5 mg total) by mouth 3 (three) times daily as needed for Muscle spasms. 60 tablet 0    Potassium Chloride ER 10 MEQ Oral Tab CR Take 2 tablets (20 mEq total) by mouth daily. 60 tablet 1    polyethylene glycol, PEG 3350, 17 GM/SCOOP Oral Powder Take 17 g by mouth daily. As needed for constipation.  May increase to twice daily as needed 578 g 1    ondansetron 8 MG Oral Tablet Dispersible Take 1 tablet (8 mg total) by mouth every 8 (eight) hours as needed for Nausea. 30 tablet 2    Simethicone (GAS-X MAXIMUM STRENGTH OR) Take by mouth.      gabapentin 100 MG Oral Cap Take 1 capsule (100 mg total) by mouth nightly. 90 capsule 1    montelukast 10  MG Oral Tab Take 1 tablet (10 mg total) by mouth nightly. 90 tablet 1    simvastatin 40 MG Oral Tab Take 1 tablet (40 mg total) by mouth every evening. 90 tablet 3    losartan 50 MG Oral Tab Take 1 tablet (50 mg total) by mouth daily. 90 tablet 3    lidocaine-prilocaine 2.5-2.5 % External Cream Apply to site 1 hour prior to port a cath needle insertion 30 g 1    ondansetron (ZOFRAN) 8 MG tablet Take 1 tablet (8 mg total) by mouth every 8 (eight) hours as needed for Nausea. 30 tablet 3    prochlorperazine (COMPAZINE) 10 mg tablet Take 1 tablet (10 mg total) by mouth every 8 (eight) hours as needed for Nausea. 30 tablet 3    traMADol 50 MG Oral Tab Take 1-2 tablets ( mg total) by mouth every 6 (six) hours as needed for Pain. 90 tablet 0    LORazepam 0.5 MG Oral Tab Take 1 tablet (0.5 mg total) by mouth every 12 (twelve) hours as needed for Anxiety. 60 tablet 0    Albuterol Sulfate HFA (PROAIR HFA) 108 (90 Base) MCG/ACT Inhalation Aero Soln Inhale 1 puff into the lungs every 6 (six) hours as needed for Wheezing. 24 g 1     Allergies:   Allergies   Allergen Reactions    Ciprofloxacin JITTERY     Also got a headache    Cortisone      Other reaction(s): Rash       Objective:   Blood pressure 126/70, pulse 93, temperature 97.9 °F (36.6 °C), temperature source Oral, resp. rate 18, height 1.651 m (5' 5\"), weight 45.8 kg (101 lb), SpO2 97%.  Physical Exam:  ECOG PS: 1  General: A&Ox3, cachetic, pale, NAD, w/c  HEENT: EOMsi, Anicteric, OP clear  CV: RRR  Pulm: normal effort, CTA, at times conversational dyspnea but recovers  Abd: soft, ntnd, bs+  Extremities: no edema  Neurological: grossly intact       Results:   Labs:  Lab Results   Component Value Date    WBC 4.1 04/10/2024    HGB 12.3 04/10/2024    HCT 34.5 (L) 04/10/2024    .0 04/10/2024    CREATSERUM 0.70 04/10/2024    BUN 10 04/10/2024     (L) 04/10/2024    K 4.0 04/10/2024     04/10/2024    CO2 29.0 04/10/2024     (H) 04/10/2024    CA  10.1 04/10/2024    ALB 3.5 04/10/2024    ALKPHO 173 (H) 04/10/2024    BILT 1.1 04/10/2024    TP 6.2 04/10/2024    AST 38 (H) 04/10/2024    ALT 20 04/10/2024    INR 1.19 (H) 01/17/2023    TSH 1.18 08/10/2015    LIP 50 04/10/2024    CRP <0.5 07/03/2016     Imaging:  CT CHEST PE AORTA (IV ONLY) (CPT=71260)    Addendum Date: 4/10/2024    CORRECTION Corrected on: 4/10/2024;   PROCEDURE: CT CHEST PE AORTA (IV ONLY) (CPT=71260)  COMPARISON: [facility], CT CHEST+ABDOMEN+PELVIS(ALL CNTRST ONLY)(CPT=71260/22212), 4/06/2024, 10:06 AM.  [facility], CT CHEST+ABDOMEN+PELVIS(ALL CNTRST ONLY)(CPT=71260/05788), 1/06/2024, 11:10 AM.  [facility], CT CHEST+ABDOMEN+PELVIS(ALL CNTRST ONLY)(CPT=71260/49001), 11/04/2023, 10:40 AM.  INDICATIONS: CP, SOB, pancreatic CA  TECHNIQUE: CT images of the chest were obtained with non-ionic intravenous contrast material.  Automated exposure control for dose reduction was used. Adjustment of the mA and/or kV was done based on the patient's size. Use of iterative reconstruction technique for dose reduction was used. Dose information is transmitted to the ACR (American College of Radiology) NRDR (National Radiology Data Registry) which includes the Dose Index Registry.  FINDINGS:  PULMONARY ARTERIES:   No pulmonary embolus   LINES AND TUBES: There is a right sided port catheter with tip at the mid SVC..  MEDIASTINUM/VASCULATURE: There are multiple mildly prominent mediastinal lymph nodes which are nonspecific and measure less than 1 cm in short axis. There is atherosclerotic calcification of the aortic arch and thoracic aorta. The thoracic aorta is otherwise unremarkable and not dilated. Mediastinal fat planes are preserved. Cardiac chambers are unremarkable. Pericardium is normal. There are coronary artery calcifications. The main pulmonary artery has a normal diameter and is otherwise unremarkable.  LUNGS AND PLEURA: 0.9 x 1.1 cm  spiculated nodule within the anterior right middle lobe has increased in size since the prior exams.  1.6 x 1.2 cm nodule within the medial left lower lobe is new.  1.1 cm nodule within the anterior left upper lobe is unchanged.  No pneumothorax or dense consolidation.  Trace right pleural effusion.  Mild interlobular septal thickening seen within the right lower lobe.  1 cm spiculated nodule within the right lower lobe has increased in size.  0.8 cm spiculated nodule  within the left lower lobe has also increased in size..  There is biapical scarring and pleural thickening.   CHEST WALL/BONES: There is degenerative disease of the thoracic spine. The chest wall and osseous structures are otherwise unremarkable.  Sclerosis and erosions seen involving the L1 vertebral body is unchanged.  LIMITED ABDOMEN: Dictated in a separate study.  Moderate size hiatal hernia is partially seen and better characterized on concurrently performed CT abdomen pelvis.  CONCLUSION:   No pulmonary embolus.  Multiple bilateral spiculated nodules have increased in size since the prior exams and suspicious for pulmonary metastases.  Interlobular septal thickening within the right lower lobe may be secondary to pulmonary edema or lymphangitic carcinomatosis.  L1 osseous metastasis is unchanged.      Dictated by (CST): Jasson Luna MD on 4/10/2024 at 1:33 PM     Finalized by (CST): Jasson Luna MD on 4/10/2024 at 1:40 PM    Dictated by (CST): Jasson Luna MD on 4/10/2024 at 1:55 PM     Finalized by (CST): Jasson Luna MD on 4/10/2024 at 1:55 PM              Result Date: 4/10/2024  CONCLUSION:   No pulmonary embolus.  Multiple bilateral spiculated nodules have increased in size since the prior exams and suspicious for pulmonary metastases.  Interlobular septal thickening within the right lower lobe may be secondary to pulmonary edema or lymphangitic carcinomatosis.  Osseous metastasis of T12.      Dictated by (CST): Jasson Luna MD on 4/10/2024  at 1:33 PM     Finalized by (CST): Jasson Luna MD on 4/10/2024 at 1:40 PM          CT ABDOMEN+PELVIS(CONTRAST ONLY)(CPT=74177)    Result Date: 4/10/2024  CONCLUSION:   Pancreatic head and pancreatic tail masses have not significantly changed since 4/6/2024 but the pancreatic head mass is significantly increased in size since 1/6/2024.  Hepatic metastases have increased in size since 1/6/2024.  Previously visualized rectosigmoid mass is not well seen on current exam.  Large amount of excessive stool seen throughout the colon suggestive of constipation. No obstruction.  L1 metastasis is unchanged.  1.1 cm enhancing mass within the left interpolar kidney is unchanged.  Continued follow-up surveillance imaging suggested.  Multiple other incidental findings as described in the body of the report which are unchanged.    Dictated by (CST): Jasson Luna MD on 4/10/2024 at 1:47 PM     Finalized by (CST): Jasson Luna MD on 4/10/2024 at 1:54 PM           XR CHEST AP PORTABLE  (CPT=71045)    Result Date: 4/10/2024  PROCEDURE: XR CHEST AP PORTABLE  (CPT=71045) TIME: 1126  COMPARISON: None.  INDICATIONS: Generalized weakness with nausea and vomting for a few days.  TECHNIQUE:   Single view.   Findings and impression:  Normal heart size, clear lungs, normal pleura.  Gosia catheter in the mid SVC.  No free air    Dictated by (CST): Shawn Choudhary MD on 4/10/2024 at 11:48 AM     Finalized by (CST): Shawn Choudhary MD on 4/10/2024 at 11:48 AM          CT CHEST+ABDOMEN+PELVIS(ALL CNTRST ONLY)(CPT=71260/12805)    Result Date: 4/6/2024  CONCLUSION:  1.  There is history of pancreatic cancer.  There are 2 lesions in the margins of the pancreatic head which are new/larger since prior exam.  There is a hypoenhancing lesion in the pancreatic tail, larger since prior exam.  Multiple hepatic lesions are present, which are new and larger since prior exam.  Multiple bilateral pulmonary nodules several which are spiculated.  They have a basal  predominance and the nodules are otherwise stable or larger since prior exam.  Enlarging hypoenhancing focus within  the anterior-lateral aspect of the spleen.  Enhancing and enlarged mesenteric focus above the rectosigmoid junction.  Findings suggest progression of disease at these sites. 2.  Nonspecific sclerosis within the L1 vertebral body which could represent post radiation treatment change or metastatic disease.  The sclerotic appearance of the bone is more prominent and more diffuse since 1/6/24. 3.  Post right hip arthroplasty.  The hardware causes beam hardening artifact limiting limiting assessment of adjacent structures. 4.  Coronary atherosclerosis. 5.  Moderate-sized hiatal hernia.  Gastroesophageal varices are present. 6.  Post cholecystectomy.  No biliary ductal dilatation. 7.  Stable 8 mm partially exophytic soft tissue density in the lateral midpole of the left kidney which could represent a solid renal mass or hemorrhagic/proteinaceous cyst.  Continued surveillance of this finding is recommended.  Renal cysts are also present.    Dictated by (CST): Saroj Stone MD on 4/06/2024 at 4:21 PM     Finalized by (CST): Saroj Stone MD on 4/06/2024 at 4:33 PM         Pathology:  Liver; core biopsy:   Moderately differentiated adenocarcinoma (see comment). MSI stable.   Comment:  Patient's recent CT of the chest, abdomen, and pelvis (1/21/23) is reported to demonstrate a 4.5 cm pancreatic tail mass which occludes the left splenic vein, multiple liver metastases, bilateral lung nodules, and left upper quadrant omental nodularity.  Sections of the liver biopsy demonstrate extensive involvement by moderately differentiated adenocarcinoma.  The tumor cells are positive for keratin AE1/AE3, CK7, CK20, , and CDX2, but are negative for GATA3 and TTF-1.    Assessment & Plan:   Lucina Manzanares is a 78 year old female with a hx of HTN, asthma, and fibromyalgia who presents for medical oncology follow-up  regarding metastatic MSI-S adenocarcinoma of the pancreas on frontline gemcitabine and abraxane.     # MSI-S moderately differentiated adenocarcinoma of the pancreas.   -began FDR [place] and dose-reduced Abraxane D1/8 q21 days on 2/9/23, with restaging in July showing a continued partial response and restaging in November 2023 showed very mild oligoprogression, specifically to 1 lesion in the liver and one small pulmonary nodule.  -restaging CT c/a/p 1/6/24 shows continued enlargement of hepatic metastasis, though other sites of disease are stable, she received radiation to progressive liver metastasis 2/12/24 - 2/16/24.  -S/p cycle 19 FDR Gemcitabine/Abraxane on 3/21/24  -4/6/24 CT c/a/p shows disease progression - Dominant liver lesion increased from 3.6 x 3 to 3.6 x 4.7.  Other new liver lesions.  New pancreatic lesion measuring 2 x 2.1.  The head and tail pancreatic lesions have also increased in size.  Pulmonary lesions are subcm.    -Given disease progression and patient's PS, agree with patient's request of discontinuing treatment.  Discussed general end of life expectancy.  Home palliative referral placed.  SW will see patient today for planning.  Concern for space issues if patient requires a hospital bed at some point.  May need inpatient hospice.  IVF today only.      Can consider a follow-up appointment with Dr. Dawkins next week if desires.  May schedule appointments as needed for supportive care.     # Supportive Care, pain management   -cancer related pain, tramadol prn  -anxiety, Lexapro 5mg daily, pt also has ativan 0.5mg q12hr prn  -constipation, Miralax BID  -CIPN with secondary insomnia, improved with gabapentin 100mg nightly   -H/o gluteal muscle spasms, cyclobenzaprine prn  -dyspnea, xopenex MDI instead of albuterol     # Comorbidities.  -HTN, losartan  -asthma, albuterol, fluticasone (see above)  -HLD, simvastatin  -GERD, lansoprazole    # Emotional well being  -discussed today, the patient is  aware of support systems available through the Cancer [facility], currently no concerns or issues. The diagnosis, prognosis, treatment goals, plan for treatment, and anticipated response were explained to the patient.   -Advanced Care Planning: not discussed today    MDM High: malignancy; review of results with independent interpretation and discussion of management; drug therapy requiring intensive monitoring for toxicity;  ongoing continuity of complex care    Leonard Pascual PA-C    [facility]  [facility]    This note was created using a voice-recognition transcribing system. Incorrect words or phrases may have been missed during proofreading. Please interpret accordingly.

## 2024-04-15 ENCOUNTER — TELEPHONE (OUTPATIENT)
Dept: HEMATOLOGY/ONCOLOGY | Facility: HOSPITAL | Age: 79
End: 2024-04-15

## 2024-04-15 DIAGNOSIS — C25.9 ADENOCARCINOMA OF PANCREAS (HCC): Primary | ICD-10-CM

## 2024-04-15 DIAGNOSIS — R53.1 GENERALIZED WEAKNESS: ICD-10-CM

## 2024-04-15 NOTE — TELEPHONE ENCOUNTER
I called both Lucina and Shawn as I was out of town last week when she met with Leonard GARCIA to review her imaging which demonstrated fairly extensive progression, furthermore the patient has deteriorated clinically and best supportive care was recommended.  They did meet with [facility] but did not officially enroll in hospice after their initial encounter last week on Friday.  Unfortunately Lucina has continued to decline.  I strongly urged them to enroll on hospice and Shawn plans to call [facility] later today.  We discussed that the emergency room or hospital admission remain available should he feel unable to control her symptoms adequately at home, but that the hospice agency should be able to guide them further.  He thanked me for the call and expressed gratitude in our care of Lucina.  We remain available as needed.    Bartolo Dawkins DO  [facility]  Rajwinder KINGSTON [facility]

## 2024-04-15 NOTE — PROGRESS NOTES
2nd attempt ER f/up apt request  PCP -decline, pt doesn't want to schedule at this time  HEM/ONC -existing apt (4/11)  Closing encounter

## (undated) NOTE — MR AVS SNAPSHOT
After Visit Summary   4/11/2024    Lucina Manzanares   MRN: [id number]           Visit Information     Date & Time  4/11/2024  3:00 PM Provider  [facility] INFRN 2 Department  Rajwinder KINGSTON [facility] - Infusion Dept. Phone  [phone]      Allergies as of 4/11/2024  Review status set to In Progress on 4/11/2024       Noted Reaction Type Reactions    Ciprofloxacin 07/24/2018   Intolerance CORNELIO    Also got a headache    Cortisone 08/12/2014        Other reaction(s): Rash      Your Current Medications        Dosage    traMADol 50 MG Oral Tab Take 1-2 tablets ( mg total) by mouth every 6 (six) hours as needed for Pain.    lansoprazole 30 MG Oral Capsule Delayed Release Take 1 capsule (30 mg total) by mouth in the morning and 1 capsule (30 mg total) before bedtime.    cyclobenzaprine 5 MG Oral Tab Take 1 tablet (5 mg total) by mouth 3 (three) times daily as needed for Muscle spasms.    Potassium Chloride ER 10 MEQ Oral Tab CR Take 2 tablets (20 mEq total) by mouth daily.    polyethylene glycol, PEG 3350, 17 GM/SCOOP Oral Powder Take 17 g by mouth daily. As needed for constipation.  May increase to twice daily as needed    ondansetron 8 MG Oral Tablet Dispersible Take 1 tablet (8 mg total) by mouth every 8 (eight) hours as needed for Nausea.    Simethicone (GAS-X MAXIMUM STRENGTH OR) Take by mouth.    gabapentin 100 MG Oral Cap Take 1 capsule (100 mg total) by mouth nightly.    montelukast 10 MG Oral Tab Take 1 tablet (10 mg total) by mouth nightly.    simvastatin 40 MG Oral Tab Take 1 tablet (40 mg total) by mouth every evening.    losartan 50 MG Oral Tab Take 1 tablet (50 mg total) by mouth daily.    lidocaine-prilocaine 2.5-2.5 % External Cream Apply to site 1 hour prior to port a cath needle insertion    ondansetron (ZOFRAN) 8 MG tablet Take 1 tablet (8 mg total) by mouth every 8 (eight) hours as needed for Nausea.    prochlorperazine (COMPAZINE) 10 mg tablet Take 1 tablet (10 mg total) by mouth  every 8 (eight) hours as needed for Nausea.    traMADol 50 MG Oral Tab Take 1-2 tablets ( mg total) by mouth every 6 (six) hours as needed for Pain.    LORazepam 0.5 MG Oral Tab Take 1 tablet (0.5 mg total) by mouth every 12 (twelve) hours as needed for Anxiety.    Albuterol Sulfate HFA (PROAIR HFA) 108 (90 Base) MCG/ACT Inhalation Aero Soln Inhale 1 puff into the lungs every 6 (six) hours as needed for Wheezing.      Diagnoses for This Visit    Adenocarcinoma of pancreas   [287988]  -  Primary  Generalized weakness   [645896]    Hypokalemia due to inadequate potassium intake   [9598963]    Hypovolemia   [276.52.ICD-9-CM]    Nausea   [772443]                       Did you know that [facility] primary care physicians now offer Video Visits through [employer] for adult patients for a variety of conditions such as allergies, back pain and cold symptoms? Skip the drive and waiting room and online chat with a doctor face-to-face using your web-cam enabled computer or mobile device wherever you are. Video Visits cost $50 and can be paid hassle-free using a credit, debit, or health savings card.  Not active on [employer]? Ask us how to get signed up today!          If you receive a survey from [employer], please take a few minutes to complete it and provide feedback. We strive to deliver the best patient experience and are looking for ways to make improvements. Your feedback will help us do so. For more information on [employer], please visit [email or web address]           No text in SmartText           No text in SmartText